# Patient Record
Sex: MALE | Race: WHITE | Employment: OTHER | URBAN - METROPOLITAN AREA
[De-identification: names, ages, dates, MRNs, and addresses within clinical notes are randomized per-mention and may not be internally consistent; named-entity substitution may affect disease eponyms.]

---

## 2018-08-31 ENCOUNTER — OFFICE VISIT (OUTPATIENT)
Dept: FAMILY MEDICINE CLINIC | Facility: CLINIC | Age: 83
End: 2018-08-31
Payer: MEDICARE

## 2018-08-31 VITALS
TEMPERATURE: 98 F | DIASTOLIC BLOOD PRESSURE: 66 MMHG | HEIGHT: 60 IN | OXYGEN SATURATION: 96 % | BODY MASS INDEX: 26.5 KG/M2 | RESPIRATION RATE: 16 BRPM | HEART RATE: 70 BPM | SYSTOLIC BLOOD PRESSURE: 138 MMHG | WEIGHT: 135 LBS

## 2018-08-31 DIAGNOSIS — J02.0 STREP PHARYNGITIS: Primary | ICD-10-CM

## 2018-08-31 DIAGNOSIS — Z87.891 FORMER SMOKER: ICD-10-CM

## 2018-08-31 DIAGNOSIS — Z20.818 STREPTOCOCCUS EXPOSURE: ICD-10-CM

## 2018-08-31 DIAGNOSIS — I10 ESSENTIAL HYPERTENSION: ICD-10-CM

## 2018-08-31 DIAGNOSIS — J06.9 VIRAL URI WITH COUGH: ICD-10-CM

## 2018-08-31 LAB — CONTROL LINE PRESENT WITH A CLEAR BACKGROUND (YES/NO): YES YES/NO

## 2018-08-31 PROCEDURE — 87880 STREP A ASSAY W/OPTIC: CPT | Performed by: NURSE PRACTITIONER

## 2018-08-31 PROCEDURE — 99203 OFFICE O/P NEW LOW 30 MIN: CPT | Performed by: NURSE PRACTITIONER

## 2018-08-31 RX ORDER — BENZONATATE 200 MG/1
200 CAPSULE ORAL 3 TIMES DAILY PRN
Qty: 30 CAPSULE | Refills: 0 | Status: SHIPPED | OUTPATIENT
Start: 2018-08-31 | End: 2021-02-08 | Stop reason: ALTCHOICE

## 2018-08-31 RX ORDER — AMOXICILLIN 500 MG/1
500 TABLET, FILM COATED ORAL 2 TIMES DAILY
Qty: 20 TABLET | Refills: 0 | Status: SHIPPED | OUTPATIENT
Start: 2018-08-31 | End: 2018-09-10

## 2018-08-31 RX ORDER — BLOOD SUGAR DIAGNOSTIC
STRIP MISCELLANEOUS
COMMUNITY
Start: 2018-08-21 | End: 2021-02-08 | Stop reason: ALTCHOICE

## 2018-08-31 RX ORDER — BIMATOPROST 0.01 %
1 DROPS OPHTHALMIC (EYE) EVERY EVENING
COMMUNITY
Start: 2018-06-13

## 2018-08-31 NOTE — PATIENT INSTRUCTIONS
· Hydrate! (cold or hot based on comfort). Drink lots of water or other non dehydrating liquids to help with illness. Salty foods, soups and tea can help with throat pain.    · Hand washing-use hand  or wash hands frequently, cover your cough · An over-the-counter anesthetic gargle  Use medicine for more relief  Over-the-counter medicine can reduce sore throat symptoms. Ask your pharmacist if you have questions about which medicine to use:  · Ease pain with anesthetic sprays.  Aspirin or an aspi You have pharyngitis (sore throat). The healthcare staff think your sore throat is caused by streptococcus (strep) bacteria. This is often called strep throat.  Strep throat can cause throat pain that is worse when swallowing, aching all over, headache, and · Can’t swallow liquids, a lot of drooling, or can’t open mouth wide due to throat pain  · Signs of dehydration, such as very dark urine or no urine, sunken eyes, dizziness  · Trouble breathing or noisy breathing  · Muffled voice  · New rash  Prevention  H · You may use acetaminophen or ibuprofen to control pain and fever, unless another medicine was prescribed. If you have chronic liver or kidney disease, have ever had a stomach ulcer or gastrointestinal bleeding, or are taking blood-thinning medicines, veronika

## 2018-08-31 NOTE — PROGRESS NOTES
CHIEF COMPLAINT:   Patient presents with:  Cough  Sore Throat      HPI:   Luis Bautista is a 82year old male who presents for uri symptoms for  4 days.  Patient reports sore throat, congestion, low grade fever, dry cough, cough is keeping pt up at nigh HEENT: See HPI  LUNGS: denies shortness of breath or wheezing, See HPI  CARDIOVASCULAR: denies chest pain or palpitations   GI: denies N/V/C or abdominal pain, appetite decreased  NEURO: No headaches    EXAM:   /68 (BP Location: Right arm, Patient Po Meds & Refills for this Visit:    Signed Prescriptions Disp Refills    benzonatate 200 MG Oral Cap 30 capsule 0      Sig: Take 1 capsule (200 mg total) by mouth 3 (three) times daily as needed for cough.       amoxicillin 500 MG Oral Tab 20 tablet 0      Si · Try a sip of water first thing after waking up. · Keep your throat moist by drinking 6 or more glasses of clear liquids every day. · Run a cool-air humidifier in your room overnight.   · Avoid cigarette smoke.   · Suck on throat lozenges, cough drops, h © 4148-1171 [employer] [address] [facility] [place]. All rights reserved. This information is not intended as a substitute for professional medical care. Always follow your healthcare professional's instructions.         Pharyng When to seek medical advice  Call your healthcare provider right away if any of these occur:  · Fever as directed by your healthcare provider  · New or worse ear pain, sinus pain, or headache  · Painful lumps in the back of neck  · Stiff neck  · Lymph node · You may use acetaminophen or ibuprofen to control pain and fever, unless another medicine was prescribed. If you have chronic liver or kidney disease, have ever had a stomach ulcer or gastrointestinal bleeding, or are taking blood-thinning medicines, veronika

## 2018-11-25 ENCOUNTER — HOSPITAL ENCOUNTER (EMERGENCY)
Facility: HOSPITAL | Age: 83
Discharge: HOME | End: 2018-11-25
Attending: EMERGENCY MEDICINE
Payer: MEDICARE

## 2018-11-25 ENCOUNTER — APPOINTMENT (OUTPATIENT)
Dept: GENERAL RADIOLOGY | Facility: HOSPITAL | Age: 83
End: 2018-11-25
Attending: EMERGENCY MEDICINE
Payer: MEDICARE

## 2018-11-25 ENCOUNTER — APPOINTMENT (OUTPATIENT)
Dept: MRI IMAGING | Facility: HOSPITAL | Age: 83
End: 2018-11-25
Attending: EMERGENCY MEDICINE
Payer: MEDICARE

## 2018-11-25 ENCOUNTER — APPOINTMENT (OUTPATIENT)
Dept: CT IMAGING | Facility: HOSPITAL | Age: 83
End: 2018-11-25
Attending: EMERGENCY MEDICINE
Payer: MEDICARE

## 2018-11-25 VITALS
HEIGHT: 60 IN | BODY MASS INDEX: 29.06 KG/M2 | WEIGHT: 148 LBS | OXYGEN SATURATION: 94 % | TEMPERATURE: 98 F | SYSTOLIC BLOOD PRESSURE: 151 MMHG | HEART RATE: 83 BPM | DIASTOLIC BLOOD PRESSURE: 60 MMHG | RESPIRATION RATE: 18 BRPM

## 2018-11-25 DIAGNOSIS — I49.3 PVC (PREMATURE VENTRICULAR CONTRACTION): ICD-10-CM

## 2018-11-25 DIAGNOSIS — R73.9 HYPERGLYCEMIA: ICD-10-CM

## 2018-11-25 DIAGNOSIS — E86.0 DEHYDRATION: ICD-10-CM

## 2018-11-25 DIAGNOSIS — H60.91 OTITIS EXTERNA OF RIGHT EAR, UNSPECIFIED CHRONICITY, UNSPECIFIED TYPE: ICD-10-CM

## 2018-11-25 DIAGNOSIS — R42 DIZZINESS: Primary | ICD-10-CM

## 2018-11-25 PROCEDURE — 96361 HYDRATE IV INFUSION ADD-ON: CPT

## 2018-11-25 PROCEDURE — 71045 X-RAY EXAM CHEST 1 VIEW: CPT | Performed by: EMERGENCY MEDICINE

## 2018-11-25 PROCEDURE — 3E1B78Z IRRIGATION OF EAR USING IRRIGATING SUBSTANCE, VIA NATURAL OR ARTIFICIAL OPENING: ICD-10-PCS | Performed by: EMERGENCY MEDICINE

## 2018-11-25 PROCEDURE — 81001 URINALYSIS AUTO W/SCOPE: CPT | Performed by: EMERGENCY MEDICINE

## 2018-11-25 PROCEDURE — 85025 COMPLETE CBC W/AUTO DIFF WBC: CPT | Performed by: EMERGENCY MEDICINE

## 2018-11-25 PROCEDURE — 80048 BASIC METABOLIC PNL TOTAL CA: CPT | Performed by: EMERGENCY MEDICINE

## 2018-11-25 PROCEDURE — 83735 ASSAY OF MAGNESIUM: CPT | Performed by: EMERGENCY MEDICINE

## 2018-11-25 PROCEDURE — 70551 MRI BRAIN STEM W/O DYE: CPT | Performed by: EMERGENCY MEDICINE

## 2018-11-25 PROCEDURE — 93010 ELECTROCARDIOGRAM REPORT: CPT | Performed by: EMERGENCY MEDICINE

## 2018-11-25 PROCEDURE — 84484 ASSAY OF TROPONIN QUANT: CPT | Performed by: EMERGENCY MEDICINE

## 2018-11-25 PROCEDURE — 70450 CT HEAD/BRAIN W/O DYE: CPT | Performed by: EMERGENCY MEDICINE

## 2018-11-25 PROCEDURE — 69209 REMOVE IMPACTED EAR WAX UNI: CPT

## 2018-11-25 PROCEDURE — 96372 THER/PROPH/DIAG INJ SC/IM: CPT

## 2018-11-25 PROCEDURE — 93005 ELECTROCARDIOGRAM TRACING: CPT

## 2018-11-25 PROCEDURE — 99285 EMERGENCY DEPT VISIT HI MDM: CPT

## 2018-11-25 PROCEDURE — 96360 HYDRATION IV INFUSION INIT: CPT

## 2018-11-25 RX ORDER — CIPROFLOXACIN AND DEXAMETHASONE 3; 1 MG/ML; MG/ML
4 SUSPENSION/ DROPS AURICULAR (OTIC) 2 TIMES DAILY
Qty: 7.5 ML | Refills: 0 | Status: SHIPPED | OUTPATIENT
Start: 2018-11-25 | End: 2021-02-08 | Stop reason: ALTCHOICE

## 2018-11-25 RX ORDER — INSULIN ASPART 100 [IU]/ML
10 INJECTION, SOLUTION INTRAVENOUS; SUBCUTANEOUS ONCE
Status: COMPLETED | OUTPATIENT
Start: 2018-11-25 | End: 2018-11-25

## 2018-11-26 NOTE — ED NOTES
[facility] States no acute process seen on imaging. Dr. Surekha Robles notified.

## 2018-11-26 NOTE — ED NOTES
Dizziness/lightheaded since beginning of November after flu and pneumonia shot.  Intermittent dizziness

## 2018-11-26 NOTE — ED PROVIDER NOTES
Patient Seen in: [facility] AND [facility] Emergency Department    History   Patient presents with:  Dizziness (neurologic)    Stated Complaint: dizzy     HPI    74-year-old male from [place] here with his family for evaluation of dizziness intermittently over carotid bruit on auscultation at this time. Cardiovascular: Normal rate, regular rhythm and intact and equal distal pulses. No obvious murmur. Pulmonary/Chest: Effort normal. No respiratory distress. Clear and equal BS b/l. Abdominal: Soft.  There is no Abnormality         Status                     ---------                               -----------         ------                     CBC W/ DIFFERENTIAL[[id number]]          Abnormal            Final result                 Please view resul and Plan     Clinical Impression:  Dizziness  (primary encounter diagnosis)  PVC (premature ventricular contraction)  Dehydration  Hyperglycemia    Disposition:  Discharge  11/25/2018 10:16 pm    Follow-up:  [address]    Call in 1 day      W

## 2018-11-26 NOTE — ED INITIAL ASSESSMENT (HPI)
Intermittent dizziness that started post flu/pneumonia vaccination. Pt seen at [facility] and cerumen buildup to right ear. Sxs not improved.

## 2018-11-26 NOTE — ED NOTES
Patient provided with discharge instructions, prescriptions and follow up information. Patient verbalized understanding of instructions without any questions. PIV removed, catheter intact.  Patient provided with wheelchair for discharged but ambulated aroun

## 2019-03-22 ENCOUNTER — LAB ENCOUNTER (OUTPATIENT)
Dept: LAB | Facility: HOSPITAL | Age: 84
End: 2019-03-22
Attending: INTERNAL MEDICINE
Payer: MEDICARE

## 2019-03-22 DIAGNOSIS — G11.9 PRIMARY CEREBELLAR DEGENERATION (HCC): Primary | ICD-10-CM

## 2019-03-22 LAB
ALBUMIN SERPL-MCNC: 3.6 G/DL (ref 3.4–5)
ALBUMIN/GLOB SERPL: 1.1 {RATIO} (ref 1–2)
ALP LIVER SERPL-CCNC: 79 U/L (ref 45–117)
ALT SERPL-CCNC: 21 U/L (ref 16–61)
ANION GAP SERPL CALC-SCNC: 7 MMOL/L (ref 0–18)
AST SERPL-CCNC: 8 U/L (ref 15–37)
BASOPHILS # BLD: 0.08 X10(3) UL (ref 0–0.2)
BASOPHILS NFR BLD: 1 %
BILIRUB SERPL-MCNC: 0.4 MG/DL (ref 0.1–2)
BUN BLD-MCNC: 25 MG/DL (ref 7–18)
BUN/CREAT SERPL: 25.8 (ref 10–20)
CALCIUM BLD-MCNC: 9.3 MG/DL (ref 8.5–10.1)
CHLORIDE SERPL-SCNC: 107 MMOL/L (ref 98–107)
CHOLEST SMN-MCNC: 158 MG/DL (ref ?–200)
CO2 SERPL-SCNC: 25 MMOL/L (ref 21–32)
CREAT BLD-MCNC: 0.97 MG/DL (ref 0.7–1.3)
CREAT UR-SCNC: 57 MG/DL
DEPRECATED RDW RBC AUTO: 48.8 FL (ref 35.1–46.3)
EOSINOPHIL # BLD: 0.82 X10(3) UL (ref 0–0.7)
EOSINOPHIL NFR BLD: 10 %
ERYTHROCYTE [DISTWIDTH] IN BLOOD BY AUTOMATED COUNT: 16.8 % (ref 11–15)
EST. AVERAGE GLUCOSE BLD GHB EST-MCNC: 169 MG/DL (ref 68–126)
GLOBULIN PLAS-MCNC: 3.3 G/DL (ref 2.8–4.4)
GLUCOSE BLD-MCNC: 115 MG/DL (ref 70–99)
HBA1C MFR BLD HPLC: 7.5 % (ref ?–5.7)
HCT VFR BLD AUTO: 35.4 % (ref 39–53)
HDLC SERPL-MCNC: 42 MG/DL (ref 40–59)
HGB BLD-MCNC: 10.8 G/DL (ref 13–17.5)
LDLC SERPL CALC-MCNC: 103 MG/DL (ref ?–100)
LYMPHOCYTES NFR BLD: 1.64 X10(3) UL (ref 1–4)
LYMPHOCYTES NFR BLD: 20 %
M PROTEIN MFR SERPL ELPH: 6.9 G/DL (ref 6.4–8.2)
MCH RBC QN AUTO: 24.4 PG (ref 26–34)
MCHC RBC AUTO-ENTMCNC: 30.5 G/DL (ref 31–37)
MCV RBC AUTO: 80.1 FL (ref 80–100)
MICROALBUMIN UR-MCNC: 75.6 MG/DL
MICROALBUMIN/CREAT 24H UR-RTO: 1326.3 UG/MG (ref ?–30)
MONOCYTES # BLD: 0.41 X10(3) UL (ref 0.1–1)
MONOCYTES NFR BLD: 5 %
MORPHOLOGY: NORMAL
NEUTROPHILS # BLD AUTO: 4.21 X10 (3) UL (ref 1.5–7.7)
NEUTROPHILS NFR BLD: 63 %
NEUTS BAND NFR BLD: 1 %
NEUTS HYPERSEG # BLD: 5.25 X10(3) UL (ref 1.5–7.7)
NONHDLC SERPL-MCNC: 116 MG/DL (ref ?–130)
OSMOLALITY SERPL CALC.SUM OF ELEC: 293 MOSM/KG (ref 275–295)
PLATELET # BLD AUTO: 253 10(3)UL (ref 150–450)
PLATELET MORPHOLOGY: NORMAL
POTASSIUM SERPL-SCNC: 4 MMOL/L (ref 3.5–5.1)
RBC # BLD AUTO: 4.42 X10(6)UL (ref 3.8–5.8)
SODIUM SERPL-SCNC: 139 MMOL/L (ref 136–145)
TOTAL CELLS COUNTED: 100
TRIGL SERPL-MCNC: 67 MG/DL (ref 30–149)
VLDLC SERPL CALC-MCNC: 13 MG/DL (ref 0–30)
WBC # BLD AUTO: 8.2 X10(3) UL (ref 4–11)

## 2019-03-22 PROCEDURE — 85007 BL SMEAR W/DIFF WBC COUNT: CPT

## 2019-03-22 PROCEDURE — 36415 COLL VENOUS BLD VENIPUNCTURE: CPT

## 2019-03-22 PROCEDURE — 82043 UR ALBUMIN QUANTITATIVE: CPT

## 2019-03-22 PROCEDURE — 82570 ASSAY OF URINE CREATININE: CPT

## 2019-03-22 PROCEDURE — 80061 LIPID PANEL: CPT

## 2019-03-22 PROCEDURE — 85027 COMPLETE CBC AUTOMATED: CPT

## 2019-03-22 PROCEDURE — 83036 HEMOGLOBIN GLYCOSYLATED A1C: CPT

## 2019-03-22 PROCEDURE — 85025 COMPLETE CBC W/AUTO DIFF WBC: CPT

## 2019-03-22 PROCEDURE — 80053 COMPREHEN METABOLIC PANEL: CPT

## 2019-04-17 ENCOUNTER — LAB ENCOUNTER (OUTPATIENT)
Dept: LAB | Facility: HOSPITAL | Age: 84
End: 2019-04-17
Attending: INTERNAL MEDICINE
Payer: MEDICARE

## 2019-04-17 DIAGNOSIS — D64.89 OTHER SPECIFIED ANEMIAS: Primary | ICD-10-CM

## 2019-04-17 PROCEDURE — 84466 ASSAY OF TRANSFERRIN: CPT

## 2019-04-17 PROCEDURE — 82746 ASSAY OF FOLIC ACID SERUM: CPT

## 2019-04-17 PROCEDURE — 36415 COLL VENOUS BLD VENIPUNCTURE: CPT

## 2019-04-17 PROCEDURE — 82607 VITAMIN B-12: CPT

## 2019-04-17 PROCEDURE — 83540 ASSAY OF IRON: CPT

## 2019-04-17 PROCEDURE — 84165 PROTEIN E-PHORESIS SERUM: CPT

## 2019-11-23 ENCOUNTER — LAB ENCOUNTER (OUTPATIENT)
Dept: LAB | Facility: HOSPITAL | Age: 84
End: 2019-11-23
Attending: INTERNAL MEDICINE
Payer: MEDICARE

## 2019-11-23 DIAGNOSIS — E11.9 DIABETES MELLITUS (HCC): ICD-10-CM

## 2019-11-23 DIAGNOSIS — I10 ESSENTIAL HYPERTENSION, MALIGNANT: ICD-10-CM

## 2019-11-23 DIAGNOSIS — E78.2 MIXED HYPERLIPIDEMIA: ICD-10-CM

## 2019-11-23 DIAGNOSIS — D50.9 IRON DEFICIENCY ANEMIA, UNSPECIFIED: Primary | ICD-10-CM

## 2019-11-23 LAB
ALBUMIN SERPL-MCNC: 4 G/DL
ALBUMIN/GLOB SERPL: 1.3 {RATIO}
ALP SERPL-CCNC: 73 U/L
ALT SERPL-CCNC: 19 U/L
ANION GAP SERPL CALC-SCNC: 5 MMOL/L
AST SERPL-CCNC: 9 U/L
BILIRUB SERPL-MCNC: 0.2 MG/DL
BUN SERPL-MCNC: 28 MG/DL
BUN/CREAT SERPL: 25.7
CALCIUM SERPL-MCNC: 9.3 MG/DL
CHLORIDE SERPL-SCNC: 110 MMOL/L
CHOLEST SERPL-MCNC: 156 MG/DL
CHOLEST/HDLC SERPL: NORMAL {RATIO}
CO2 SERPL-SCNC: 25 MMOL/L
CREAT SERPL-MCNC: 1.09 MG/DL
GLOBULIN SER-MCNC: 3.1 G/DL
GLUCOSE SERPL-MCNC: 115 MG/DL
HDLC SERPL-MCNC: 34 MG/DL
LDLC SERPL CALC-MCNC: 102 MG/DL
LENGTH OF FAST TIME PATIENT: NORMAL H
LENGTH OF FAST TIME PATIENT: NORMAL H
NONHDLC SERPL-MCNC: 122 MG/DL
POTASSIUM SERPL-SCNC: 4.1 MMOL/L
PROT SERPL-MCNC: 7.1 G/DL
SODIUM SERPL-SCNC: 140 MMOL/L
TRIGL SERPL-MCNC: 99 MG/DL
VLDLC SERPL CALC-MCNC: 20 MG/DL

## 2019-11-23 PROCEDURE — 84466 ASSAY OF TRANSFERRIN: CPT

## 2019-11-23 PROCEDURE — 80053 COMPREHEN METABOLIC PANEL: CPT

## 2019-11-23 PROCEDURE — 36415 COLL VENOUS BLD VENIPUNCTURE: CPT

## 2019-11-23 PROCEDURE — 83540 ASSAY OF IRON: CPT

## 2019-11-23 PROCEDURE — 83036 HEMOGLOBIN GLYCOSYLATED A1C: CPT

## 2019-11-23 PROCEDURE — 80061 LIPID PANEL: CPT

## 2020-01-01 ENCOUNTER — EXTERNAL RECORD (OUTPATIENT)
Dept: OTHER | Age: 85
End: 2020-01-01

## 2020-01-18 ENCOUNTER — LAB ENCOUNTER (OUTPATIENT)
Dept: LAB | Age: 85
End: 2020-01-18
Attending: INTERNAL MEDICINE
Payer: MEDICARE

## 2020-01-18 DIAGNOSIS — I10 HYPERTENSION: ICD-10-CM

## 2020-01-18 DIAGNOSIS — I10 HYPERTENSION: Primary | ICD-10-CM

## 2020-01-18 LAB
ABSOLUTE IMMATURE GRANULOCYTES (OFFPRE24): NORMAL
ABSOLUTE NRBC (AUTO): NORMAL
BASO+EOS+MONOS # BLD: NORMAL 10*3/UL
BASO+EOS+MONOS NFR BLD: NORMAL %
BASOPHILS # BLD AUTO: 0.06 X10(3) UL (ref 0–0.2)
BASOPHILS # BLD: NORMAL 10*3/UL
BASOPHILS NFR BLD AUTO: 0.7 %
BASOPHILS NFR BLD: NORMAL %
DEPRECATED RDW RBC AUTO: 44.5 FL (ref 35.1–46.3)
DIFFERENTIAL METHOD BLD: NORMAL
EOSINOPHIL # BLD AUTO: 0.49 X10(3) UL (ref 0–0.7)
EOSINOPHIL # BLD: NORMAL 10*3/UL
EOSINOPHIL NFR BLD AUTO: 5.3 %
EOSINOPHIL NFR BLD: NORMAL %
ERYTHROCYTE [DISTWIDTH] IN BLOOD BY AUTOMATED COUNT: 14.5 % (ref 11–15)
ERYTHROCYTE [DISTWIDTH] IN BLOOD BY AUTOMATED COUNT: NORMAL %
ERYTHROCYTE [DISTWIDTH] IN BLOOD: NORMAL %
HCT CALC (HGB X3) (OFFPRE23): 35.3
HCT VFR BLD AUTO: 35.3 % (ref 39–53)
HCT VFR BLD CALC: NORMAL %
HGB BLD-MCNC: 11 G/DL
HGB BLD-MCNC: 11 G/DL (ref 13–17.5)
IMM GRANULOCYTES # BLD AUTO: 0.03 X10(3) UL (ref 0–1)
IMM GRANULOCYTES NFR BLD: 0.3 %
IMMATURE GRANULOCYTES (OFFPRE25): NORMAL
LYMPHOCYTES # BLD AUTO: 1.6 X10(3) UL (ref 1–4)
LYMPHOCYTES # BLD: NORMAL 10*3/UL
LYMPHOCYTES NFR BLD AUTO: 17.4 %
LYMPHOCYTES NFR BLD: NORMAL %
MCH RBC QN AUTO: 26.3 PG
MCH RBC QN AUTO: 26.3 PG (ref 26–34)
MCHC RBC AUTO-ENTMCNC: 31.2 G/DL
MCHC RBC AUTO-ENTMCNC: 31.2 G/DL (ref 31–37)
MCV RBC AUTO: 84.2 FL
MCV RBC AUTO: 84.2 FL (ref 80–100)
MONOCYTES # BLD AUTO: 0.93 X10(3) UL (ref 0.1–1)
MONOCYTES # BLD: NORMAL 10*3/UL
MONOCYTES NFR BLD AUTO: 10.1 %
MONOCYTES NFR BLD: NORMAL %
MPV (OFFPRE2): NORMAL
NEUTROPHILS # BLD AUTO: 6.1 X10 (3) UL (ref 1.5–7.7)
NEUTROPHILS # BLD AUTO: 6.1 X10(3) UL (ref 1.5–7.7)
NEUTROPHILS # BLD: NORMAL 10*3/UL
NEUTROPHILS NFR BLD AUTO: 66.2 %
NEUTROPHILS NFR BLD: NORMAL %
NRBC BLD MANUAL-RTO: NORMAL %
PLAT MORPH BLD: NORMAL
PLATELET # BLD AUTO: 216 10(3)UL (ref 150–450)
PLATELET # BLD: 216 10*3/UL
RBC # BLD AUTO: 4.19 X10(6)UL (ref 3.8–5.8)
RBC # BLD: 4.19 10*6/UL
RBC MORPH BLD: NORMAL
WBC # BLD AUTO: 9.2 X10(3) UL (ref 4–11)
WBC # BLD: 9.2 10*3/UL
WBC MORPH BLD: NORMAL

## 2020-01-18 PROCEDURE — 85025 COMPLETE CBC W/AUTO DIFF WBC: CPT

## 2020-01-18 PROCEDURE — 93010 ELECTROCARDIOGRAM REPORT: CPT | Performed by: INTERNAL MEDICINE

## 2020-01-18 PROCEDURE — 93005 ELECTROCARDIOGRAM TRACING: CPT

## 2020-01-18 PROCEDURE — 36415 COLL VENOUS BLD VENIPUNCTURE: CPT

## 2020-02-05 RX ORDER — BENZONATATE 200 MG/1
200 CAPSULE ORAL 3 TIMES DAILY PRN
COMMUNITY
Start: 2018-08-31 | End: 2020-02-10

## 2020-02-05 RX ORDER — CLOPIDOGREL BISULFATE 75 MG/1
75 TABLET ORAL DAILY
COMMUNITY
Start: 2019-01-31

## 2020-02-05 RX ORDER — ALBUTEROL SULFATE 90 UG/1
2 AEROSOL, METERED RESPIRATORY (INHALATION) EVERY 6 HOURS PRN
COMMUNITY
Start: 2019-01-09 | End: 2020-02-10

## 2020-02-05 RX ORDER — ALLOPURINOL 300 MG/1
300 TABLET ORAL DAILY
COMMUNITY
Start: 2019-02-11

## 2020-02-05 RX ORDER — CIPROFLOXACIN AND DEXAMETHASONE 3; 1 MG/ML; MG/ML
4 SUSPENSION/ DROPS AURICULAR (OTIC) 2 TIMES DAILY
COMMUNITY
Start: 2018-11-25

## 2020-02-05 RX ORDER — METOPROLOL SUCCINATE 100 MG/1
100 TABLET, EXTENDED RELEASE ORAL DAILY
COMMUNITY
Start: 2019-01-31

## 2020-02-05 RX ORDER — AMLODIPINE BESYLATE 5 MG/1
5 TABLET ORAL DAILY
COMMUNITY
Start: 2019-02-08 | End: 2021-02-22 | Stop reason: SDUPTHER

## 2020-02-05 RX ORDER — GLYBURIDE-METFORMIN HYDROCHLORIDE 2.5; 5 MG/1; MG/1
1 TABLET ORAL 2 TIMES DAILY
COMMUNITY

## 2020-02-05 RX ORDER — LOSARTAN POTASSIUM AND HYDROCHLOROTHIAZIDE 25; 100 MG/1; MG/1
1 TABLET ORAL DAILY
COMMUNITY
Start: 2019-01-31 | End: 2021-02-17 | Stop reason: SDUPTHER

## 2020-02-10 ENCOUNTER — OFFICE VISIT (OUTPATIENT)
Dept: CARDIOLOGY | Age: 85
End: 2020-02-10

## 2020-02-10 VITALS
WEIGHT: 132 LBS | HEIGHT: 60 IN | SYSTOLIC BLOOD PRESSURE: 140 MMHG | OXYGEN SATURATION: 98 % | DIASTOLIC BLOOD PRESSURE: 58 MMHG | BODY MASS INDEX: 25.91 KG/M2 | HEART RATE: 56 BPM

## 2020-02-10 DIAGNOSIS — I25.10 ATHEROSCLEROSIS OF NATIVE CORONARY ARTERY OF NATIVE HEART WITHOUT ANGINA PECTORIS: Primary | ICD-10-CM

## 2020-02-10 DIAGNOSIS — E78.49 OTHER HYPERLIPIDEMIA: ICD-10-CM

## 2020-02-10 DIAGNOSIS — I65.23 BILATERAL CAROTID ARTERY STENOSIS: ICD-10-CM

## 2020-02-10 DIAGNOSIS — R01.1 MURMUR: ICD-10-CM

## 2020-02-10 PROCEDURE — 99204 OFFICE O/P NEW MOD 45 MIN: CPT | Performed by: INTERNAL MEDICINE

## 2020-02-10 RX ORDER — ATORVASTATIN CALCIUM 40 MG/1
40 TABLET, FILM COATED ORAL DAILY
Qty: 30 TABLET | Refills: 5 | Status: SHIPPED | OUTPATIENT
Start: 2020-02-10 | End: 2020-07-20

## 2020-02-10 SDOH — HEALTH STABILITY: MENTAL HEALTH: HOW OFTEN DO YOU HAVE A DRINK CONTAINING ALCOHOL?: NEVER

## 2020-02-10 ASSESSMENT — PATIENT HEALTH QUESTIONNAIRE - PHQ9
2. FEELING DOWN, DEPRESSED OR HOPELESS: NOT AT ALL
SUM OF ALL RESPONSES TO PHQ9 QUESTIONS 1 AND 2: 0
SUM OF ALL RESPONSES TO PHQ9 QUESTIONS 1 AND 2: 0
1. LITTLE INTEREST OR PLEASURE IN DOING THINGS: NOT AT ALL

## 2020-03-12 ENCOUNTER — HOSPITAL ENCOUNTER (OUTPATIENT)
Dept: ULTRASOUND IMAGING | Facility: HOSPITAL | Age: 85
Discharge: HOME | End: 2020-03-12
Attending: INTERNAL MEDICINE
Payer: MEDICARE

## 2020-03-12 ENCOUNTER — HOSPITAL ENCOUNTER (OUTPATIENT)
Dept: CV DIAGNOSTICS | Facility: HOSPITAL | Age: 85
Discharge: HOME | End: 2020-03-12
Attending: INTERNAL MEDICINE
Payer: MEDICARE

## 2020-03-12 DIAGNOSIS — I65.23 BILATERAL CAROTID ARTERY STENOSIS: ICD-10-CM

## 2020-03-12 DIAGNOSIS — R01.1 MURMUR: ICD-10-CM

## 2020-03-12 PROCEDURE — 93306 TTE W/DOPPLER COMPLETE: CPT | Performed by: INTERNAL MEDICINE

## 2020-03-12 PROCEDURE — 93880 EXTRACRANIAL BILAT STUDY: CPT | Performed by: INTERNAL MEDICINE

## 2020-03-13 ENCOUNTER — TELEPHONE (OUTPATIENT)
Dept: CARDIOLOGY | Age: 85
End: 2020-03-13

## 2020-06-16 LAB — RETINOPATHY PRESENT (RTP): YES

## 2020-07-20 DIAGNOSIS — E78.49 OTHER HYPERLIPIDEMIA: ICD-10-CM

## 2020-07-20 RX ORDER — ATORVASTATIN CALCIUM 40 MG/1
40 TABLET, FILM COATED ORAL DAILY
Qty: 30 TABLET | Refills: 0 | Status: SHIPPED | OUTPATIENT
Start: 2020-07-20 | End: 2020-08-24

## 2020-07-20 RX ORDER — ATORVASTATIN CALCIUM 40 MG/1
40 TABLET, FILM COATED ORAL DAILY
Qty: 90 TABLET | OUTPATIENT
Start: 2020-07-20

## 2020-07-20 RX ORDER — ATORVASTATIN CALCIUM 40 MG/1
40 TABLET, FILM COATED ORAL DAILY
Qty: 30 TABLET | Refills: 1 | Status: SHIPPED | OUTPATIENT
Start: 2020-07-20 | End: 2020-07-20

## 2020-07-21 RX ORDER — TERAZOSIN 2 MG/1
CAPSULE ORAL
COMMUNITY
Start: 2020-05-12

## 2020-07-21 RX ORDER — HYDROCHLOROTHIAZIDE 25 MG/1
1 TABLET ORAL DAILY
COMMUNITY
Start: 2020-06-29

## 2020-07-21 RX ORDER — PREDNISOLONE ACETATE 10 MG/ML
SUSPENSION/ DROPS OPHTHALMIC
COMMUNITY
Start: 2020-05-02

## 2020-07-21 RX ORDER — DORZOLAMIDE HYDROCHLORIDE AND TIMOLOL MALEATE 20; 5 MG/ML; MG/ML
SOLUTION/ DROPS OPHTHALMIC
COMMUNITY
Start: 2020-05-28

## 2020-07-24 ENCOUNTER — LAB ENCOUNTER (OUTPATIENT)
Dept: LAB | Facility: HOSPITAL | Age: 85
End: 2020-07-24
Attending: INTERNAL MEDICINE
Payer: MEDICARE

## 2020-07-24 DIAGNOSIS — E11.319 DIABETIC RETINAL MICROANEURYSM (HCC): Primary | ICD-10-CM

## 2020-07-24 DIAGNOSIS — Z00.00 ROUTINE GENERAL MEDICAL EXAMINATION AT A HEALTH CARE FACILITY: ICD-10-CM

## 2020-07-24 DIAGNOSIS — E78.5 HYPERLIPEMIA: ICD-10-CM

## 2020-07-24 DIAGNOSIS — Z12.5 SPECIAL SCREENING FOR MALIGNANT NEOPLASM OF PROSTATE: ICD-10-CM

## 2020-07-24 DIAGNOSIS — H35.049 DIABETIC RETINAL MICROANEURYSM (HCC): Primary | ICD-10-CM

## 2020-07-24 DIAGNOSIS — I10 ESSENTIAL HYPERTENSION, MALIGNANT: ICD-10-CM

## 2020-07-24 LAB
ALBUMIN SERPL-MCNC: 3.6 G/DL
ALBUMIN SERPL-MCNC: 3.6 G/DL (ref 3.4–5)
ALBUMIN/GLOB SERPL: 1 {RATIO}
ALBUMIN/GLOB SERPL: 1 {RATIO} (ref 1–2)
ALP LIVER SERPL-CCNC: 92 U/L (ref 45–117)
ALP SERPL-CCNC: 92 U/L
ALT SERPL-CCNC: 36 U/L (ref 16–61)
ALT SERPL-CCNC: 36 UNITS/L
ANION GAP SERPL CALC-SCNC: 6 MMOL/L
ANION GAP SERPL CALC-SCNC: 6 MMOL/L (ref 0–18)
AST SERPL-CCNC: 18 U/L (ref 15–37)
AST SERPL-CCNC: 18 UNITS/L
BASOPHILS # BLD AUTO: 0.06 X10(3) UL (ref 0–0.2)
BASOPHILS NFR BLD AUTO: 0.6 %
BILIRUB SERPL-MCNC: 0.3 MG/DL
BILIRUB SERPL-MCNC: 0.3 MG/DL (ref 0.1–2)
BILIRUB UR QL: NEGATIVE
BUN BLD-MCNC: 19 MG/DL (ref 7–18)
BUN SERPL-MCNC: 19 MG/DL
BUN/CREAT SERPL: 18.8
BUN/CREAT SERPL: 18.8 (ref 10–20)
CALCIUM BLD-MCNC: 8.8 MG/DL (ref 8.5–10.1)
CALCIUM SERPL-MCNC: 8.8 MG/DL
CHLORIDE SERPL-SCNC: 109 MMOL/L
CHLORIDE SERPL-SCNC: 109 MMOL/L (ref 98–112)
CHOLEST SERPL-MCNC: 97 MG/DL
CHOLEST SMN-MCNC: 97 MG/DL (ref ?–200)
CLARITY UR: CLEAR
CO2 SERPL-SCNC: 26 MMOL/L
CO2 SERPL-SCNC: 26 MMOL/L (ref 21–32)
COLOR UR: YELLOW
COMPLEXED PSA SERPL-MCNC: 4.87 NG/ML (ref ?–4)
CREAT BLD-MCNC: 1.01 MG/DL (ref 0.7–1.3)
CREAT SERPL-MCNC: 1.01 MG/DL
CREAT UR-SCNC: 87.4 MG/DL
DEPRECATED RDW RBC AUTO: 45.4 FL (ref 35.1–46.3)
EOSINOPHIL # BLD AUTO: 0.75 X10(3) UL (ref 0–0.7)
EOSINOPHIL NFR BLD AUTO: 7.1 %
ERYTHROCYTE [DISTWIDTH] IN BLOOD BY AUTOMATED COUNT: 15.3 % (ref 11–15)
ERYTHROCYTE [DISTWIDTH] IN BLOOD BY AUTOMATED COUNT: 45.4 %
ERYTHROCYTE [DISTWIDTH] IN BLOOD: 15.3 %
EST. AVERAGE GLUCOSE BLD GHB EST-MCNC: 154 MG/DL (ref 68–126)
ESTIMATED AVERAGE GLUCOSE: 154
GLOBULIN PLAS-MCNC: 3.6 G/DL (ref 2.8–4.4)
GLOBULIN SER-MCNC: 3.6 G/DL
GLUCOSE BLD-MCNC: 90 MG/DL (ref 70–99)
GLUCOSE SERPL-MCNC: 90 MG/DL
GLUCOSE UR-MCNC: NEGATIVE MG/DL
HBA1C MFR BLD HPLC: 7 % (ref ?–5.7)
HBA1C MFR BLD: 7 %
HCT VFR BLD AUTO: 34.6 % (ref 39–53)
HCT VFR BLD CALC: 34.6 %
HDLC SERPL-MCNC: 38 MG/DL
HDLC SERPL-MCNC: 38 MG/DL (ref 40–59)
HGB BLD-MCNC: 11 G/DL
HGB BLD-MCNC: 11 G/DL (ref 13–17.5)
IMM GRANULOCYTES # BLD AUTO: 0.03 X10(3) UL (ref 0–1)
IMM GRANULOCYTES NFR BLD: 0.3 %
KETONES UR-MCNC: NEGATIVE MG/DL
LDLC SERPL CALC-MCNC: 41 MG/DL
LDLC SERPL CALC-MCNC: 41 MG/DL (ref ?–100)
LENGTH OF FAST TIME PATIENT: YES H
LENGTH OF FAST TIME PATIENT: YES H
LEUKOCYTE ESTERASE UR QL STRIP.AUTO: NEGATIVE
LYMPHOCYTES # BLD AUTO: 1.69 X10(3) UL (ref 1–4)
LYMPHOCYTES NFR BLD AUTO: 16 %
M PROTEIN MFR SERPL ELPH: 7.2 G/DL (ref 6.4–8.2)
MCH RBC QN AUTO: 26.2 PG
MCH RBC QN AUTO: 26.2 PG (ref 26–34)
MCHC RBC AUTO-ENTMCNC: 31.8 G/DL
MCHC RBC AUTO-ENTMCNC: 31.8 G/DL (ref 31–37)
MCV RBC AUTO: 82.4 FL
MCV RBC AUTO: 82.4 FL (ref 80–100)
MICROALBUMIN UR-MCNC: 47.7 MG/DL
MICROALBUMIN/CREAT 24H UR-RTO: 545.8 UG/MG (ref ?–30)
MONOCYTES # BLD AUTO: 0.83 X10(3) UL (ref 0.1–1)
MONOCYTES NFR BLD AUTO: 7.8 %
NEUTROPHILS # BLD AUTO: 7.22 X10 (3) UL (ref 1.5–7.7)
NEUTROPHILS # BLD AUTO: 7.22 X10(3) UL (ref 1.5–7.7)
NEUTROPHILS NFR BLD AUTO: 68.2 %
NITRITE UR QL STRIP.AUTO: NEGATIVE
NONHDLC SERPL-MCNC: 59 MG/DL
NONHDLC SERPL-MCNC: 59 MG/DL (ref ?–130)
OSMOLALITY SERPL CALC.SUM OF ELEC: 294 MOSM/KG (ref 275–295)
PATIENT FASTING Y/N/NP: YES
PATIENT FASTING Y/N/NP: YES
PH UR: 5 [PH] (ref 5–8)
PLATELET # BLD AUTO: 226 10(3)UL (ref 150–450)
PLATELET # BLD: 226 K/MCL
POTASSIUM SERPL-SCNC: 4.3 MMOL/L
POTASSIUM SERPL-SCNC: 4.3 MMOL/L (ref 3.5–5.1)
PROT SERPL-MCNC: 7.2 G/DL
PROT UR-MCNC: 100 MG/DL
RBC # BLD AUTO: 4.2 X10(6)UL (ref 3.8–5.8)
RBC # BLD: 4.2 10*6/UL
RBC #/AREA URNS AUTO: 1 /HPF
SODIUM SERPL-SCNC: 141 MMOL/L
SODIUM SERPL-SCNC: 141 MMOL/L (ref 136–145)
SP GR UR STRIP: 1.02 (ref 1–1.03)
TRIGL SERPL-MCNC: 88 MG/DL
TRIGL SERPL-MCNC: 88 MG/DL (ref 30–149)
UROBILINOGEN UR STRIP-ACNC: <2
VLDLC SERPL CALC-MCNC: 18 MG/DL
VLDLC SERPL CALC-MCNC: 18 MG/DL (ref 0–30)
WBC # BLD AUTO: 10.6 X10(3) UL (ref 4–11)
WBC # BLD: 10.6 K/MCL
WBC #/AREA URNS AUTO: <1 /HPF

## 2020-07-24 PROCEDURE — 82043 UR ALBUMIN QUANTITATIVE: CPT

## 2020-07-24 PROCEDURE — 80053 COMPREHEN METABOLIC PANEL: CPT

## 2020-07-24 PROCEDURE — 82570 ASSAY OF URINE CREATININE: CPT

## 2020-07-24 PROCEDURE — 80061 LIPID PANEL: CPT

## 2020-07-24 PROCEDURE — 81001 URINALYSIS AUTO W/SCOPE: CPT

## 2020-07-24 PROCEDURE — 83036 HEMOGLOBIN GLYCOSYLATED A1C: CPT

## 2020-07-24 PROCEDURE — 85025 COMPLETE CBC W/AUTO DIFF WBC: CPT

## 2020-07-24 PROCEDURE — 36415 COLL VENOUS BLD VENIPUNCTURE: CPT

## 2020-07-27 ENCOUNTER — OFFICE VISIT (OUTPATIENT)
Dept: CARDIOLOGY | Age: 85
End: 2020-07-27

## 2020-07-27 VITALS
BODY MASS INDEX: 24.61 KG/M2 | HEART RATE: 64 BPM | RESPIRATION RATE: 18 BRPM | DIASTOLIC BLOOD PRESSURE: 58 MMHG | WEIGHT: 126 LBS | OXYGEN SATURATION: 97 % | SYSTOLIC BLOOD PRESSURE: 146 MMHG

## 2020-07-27 DIAGNOSIS — I25.10 ATHEROSCLEROSIS OF NATIVE CORONARY ARTERY OF NATIVE HEART WITHOUT ANGINA PECTORIS: Primary | ICD-10-CM

## 2020-07-27 DIAGNOSIS — E78.49 OTHER HYPERLIPIDEMIA: ICD-10-CM

## 2020-07-27 DIAGNOSIS — I65.23 BILATERAL CAROTID ARTERY STENOSIS: ICD-10-CM

## 2020-07-27 PROCEDURE — 99214 OFFICE O/P EST MOD 30 MIN: CPT | Performed by: INTERNAL MEDICINE

## 2020-07-27 ASSESSMENT — PATIENT HEALTH QUESTIONNAIRE - PHQ9
SUM OF ALL RESPONSES TO PHQ9 QUESTIONS 1 AND 2: 0
CLINICAL INTERPRETATION OF PHQ9 SCORE: NO FURTHER SCREENING NEEDED
CLINICAL INTERPRETATION OF PHQ2 SCORE: NO FURTHER SCREENING NEEDED
SUM OF ALL RESPONSES TO PHQ9 QUESTIONS 1 AND 2: 0
1. LITTLE INTEREST OR PLEASURE IN DOING THINGS: NOT AT ALL
2. FEELING DOWN, DEPRESSED OR HOPELESS: NOT AT ALL

## 2020-08-24 DIAGNOSIS — E78.49 OTHER HYPERLIPIDEMIA: ICD-10-CM

## 2020-08-24 RX ORDER — ATORVASTATIN CALCIUM 40 MG/1
40 TABLET, FILM COATED ORAL DAILY
Qty: 90 TABLET | Refills: 1 | Status: SHIPPED | OUTPATIENT
Start: 2020-08-24 | End: 2021-03-06 | Stop reason: SDUPTHER

## 2020-11-03 ENCOUNTER — LAB ENCOUNTER (OUTPATIENT)
Dept: LAB | Facility: HOSPITAL | Age: 85
End: 2020-11-03
Attending: INTERNAL MEDICINE
Payer: MEDICARE

## 2020-11-03 DIAGNOSIS — E11.69 DIABETES MELLITUS ASSOCIATED WITH HORMONAL ETIOLOGY (HCC): Primary | ICD-10-CM

## 2020-11-03 LAB
ALBUMIN SERPL-MCNC: 3.8 G/DL
ALBUMIN/GLOB SERPL: 1.2 {RATIO}
ALP SERPL-CCNC: 86 U/L
ALT SERPL-CCNC: 35 UNITS/L
ANION GAP SERPL CALC-SCNC: 6 MMOL/L
AST SERPL-CCNC: 19 UNITS/L
BILIRUB SERPL-MCNC: 0.3 MG/DL
BUN SERPL-MCNC: 29 MG/DL
BUN/CREAT SERPL: 27.4
CALCIUM SERPL-MCNC: 9.2 MG/DL
CHLORIDE SERPL-SCNC: 111 MMOL/L
CHOLEST SERPL-MCNC: 88 MG/DL
CO2 SERPL-SCNC: 25 MMOL/L
CREAT SERPL-MCNC: 1.06 MG/DL
ESTIMATED AVERAGE GLUCOSE: 160
GLOBULIN SER-MCNC: 3.1 G/DL
GLUCOSE SERPL-MCNC: 149 MG/DL
HBA1C MFR BLD: 7.2 %
HDLC SERPL-MCNC: 41 MG/DL
LDLC SERPL CALC-MCNC: 37 MG/DL
LENGTH OF FAST TIME PATIENT: YES H
LENGTH OF FAST TIME PATIENT: YES H
NONHDLC SERPL-MCNC: 47 MG/DL
POTASSIUM SERPL-SCNC: 4.1 MMOL/L
PROT SERPL-MCNC: 6.9 G/DL
SODIUM SERPL-SCNC: 142 MMOL/L
TRIGL SERPL-MCNC: 52 MG/DL
VLDLC SERPL CALC-MCNC: 10 MG/DL

## 2020-11-03 PROCEDURE — 80061 LIPID PANEL: CPT

## 2020-11-03 PROCEDURE — 83036 HEMOGLOBIN GLYCOSYLATED A1C: CPT

## 2020-11-03 PROCEDURE — 80053 COMPREHEN METABOLIC PANEL: CPT

## 2020-11-03 PROCEDURE — 36415 COLL VENOUS BLD VENIPUNCTURE: CPT

## 2021-01-25 ENCOUNTER — APPOINTMENT (OUTPATIENT)
Dept: CARDIOLOGY | Age: 86
End: 2021-01-25

## 2021-02-01 ENCOUNTER — APPOINTMENT (OUTPATIENT)
Dept: CARDIOLOGY | Age: 86
End: 2021-02-01

## 2021-02-08 ENCOUNTER — OFFICE VISIT (OUTPATIENT)
Dept: INTERNAL MEDICINE CLINIC | Facility: CLINIC | Age: 86
End: 2021-02-08
Payer: MEDICARE

## 2021-02-08 VITALS
BODY MASS INDEX: 25.88 KG/M2 | SYSTOLIC BLOOD PRESSURE: 161 MMHG | DIASTOLIC BLOOD PRESSURE: 70 MMHG | HEART RATE: 67 BPM | WEIGHT: 131.81 LBS | HEIGHT: 60 IN

## 2021-02-08 DIAGNOSIS — I65.23 BILATERAL CAROTID ARTERY STENOSIS: ICD-10-CM

## 2021-02-08 DIAGNOSIS — K21.9 GASTROESOPHAGEAL REFLUX DISEASE, UNSPECIFIED WHETHER ESOPHAGITIS PRESENT: ICD-10-CM

## 2021-02-08 DIAGNOSIS — I25.10 CORONARY ARTERY DISEASE INVOLVING NATIVE CORONARY ARTERY OF NATIVE HEART WITHOUT ANGINA PECTORIS: ICD-10-CM

## 2021-02-08 DIAGNOSIS — E11.9 TYPE 2 DIABETES MELLITUS WITHOUT COMPLICATION, WITHOUT LONG-TERM CURRENT USE OF INSULIN (HCC): Primary | ICD-10-CM

## 2021-02-08 DIAGNOSIS — I10 ESSENTIAL HYPERTENSION: ICD-10-CM

## 2021-02-08 DIAGNOSIS — I45.10 RBBB (RIGHT BUNDLE BRANCH BLOCK): ICD-10-CM

## 2021-02-08 PROCEDURE — 3078F DIAST BP <80 MM HG: CPT | Performed by: INTERNAL MEDICINE

## 2021-02-08 PROCEDURE — 3077F SYST BP >= 140 MM HG: CPT | Performed by: INTERNAL MEDICINE

## 2021-02-08 PROCEDURE — 3008F BODY MASS INDEX DOCD: CPT | Performed by: INTERNAL MEDICINE

## 2021-02-08 PROCEDURE — 99203 OFFICE O/P NEW LOW 30 MIN: CPT | Performed by: INTERNAL MEDICINE

## 2021-02-08 RX ORDER — TERAZOSIN 2 MG/1
CAPSULE ORAL
COMMUNITY
Start: 2021-01-30 | End: 2021-08-09

## 2021-02-08 RX ORDER — ALLOPURINOL 300 MG/1
300 TABLET ORAL DAILY
COMMUNITY
Start: 2021-01-09 | End: 2021-08-09

## 2021-02-08 RX ORDER — CLOPIDOGREL BISULFATE 75 MG/1
75 TABLET ORAL DAILY
COMMUNITY
Start: 2020-11-28 | End: 2021-08-09

## 2021-02-08 RX ORDER — HYDROCHLOROTHIAZIDE 25 MG/1
12.5 TABLET ORAL DAILY
COMMUNITY
Start: 2020-12-28 | End: 2021-09-28

## 2021-02-08 RX ORDER — GLYBURIDE-METFORMIN HYDROCHLORIDE 2.5; 5 MG/1; MG/1
1 TABLET ORAL 2 TIMES DAILY WITH MEALS
COMMUNITY
End: 2021-09-26

## 2021-02-08 RX ORDER — METOPROLOL SUCCINATE 100 MG/1
100 TABLET, EXTENDED RELEASE ORAL DAILY
COMMUNITY
Start: 2021-01-04 | End: 2021-08-09

## 2021-02-08 RX ORDER — DORZOLAMIDE HYDROCHLORIDE AND TIMOLOL MALEATE 20; 5 MG/ML; MG/ML
1 SOLUTION/ DROPS OPHTHALMIC 2 TIMES DAILY
COMMUNITY
Start: 2021-01-23

## 2021-02-08 RX ORDER — AMLODIPINE BESYLATE 10 MG/1
TABLET ORAL
COMMUNITY
Start: 2020-12-08 | End: 2021-08-09

## 2021-02-08 RX ORDER — ATORVASTATIN CALCIUM 40 MG/1
40 TABLET, FILM COATED ORAL NIGHTLY
COMMUNITY
Start: 2020-11-23

## 2021-02-08 RX ORDER — GLIPIZIDE AND METFORMIN HCL 2.5; 5 MG/1; MG/1
1 TABLET, FILM COATED ORAL 2 TIMES DAILY WITH MEALS
COMMUNITY
Start: 2020-12-08 | End: 2021-02-08 | Stop reason: ALTCHOICE

## 2021-02-08 RX ORDER — LOSARTAN POTASSIUM 100 MG/1
100 TABLET ORAL DAILY
COMMUNITY
Start: 2021-01-09 | End: 2022-01-24

## 2021-02-10 ENCOUNTER — TELEPHONE (OUTPATIENT)
Dept: INTERNAL MEDICINE CLINIC | Facility: CLINIC | Age: 86
End: 2021-02-10

## 2021-02-10 DIAGNOSIS — I25.119 ATHEROSCLEROSIS OF NATIVE CORONARY ARTERY OF NATIVE HEART WITH ANGINA PECTORIS (HCC): ICD-10-CM

## 2021-02-10 DIAGNOSIS — Z01.00 EYE EXAM, ROUTINE: Primary | ICD-10-CM

## 2021-02-10 NOTE — TELEPHONE ENCOUNTER
Patient is requesting a referral for 3 visits with Dr. Oli Campoverde and a referral for a yearly eye exam with Dr. Devan Jesus.

## 2021-02-10 NOTE — TELEPHONE ENCOUNTER
Dr. Mihaela Mendez, patient is requesting a referral to Dr. Esperanza Munoz and Dr. Marylee Fine. Referral has been pended, please advise.

## 2021-02-11 DIAGNOSIS — Z23 NEED FOR VACCINATION: ICD-10-CM

## 2021-02-16 NOTE — TELEPHONE ENCOUNTER
Patient called to follow up on this. Informed that Cardiology referral is authorized but Opthalmology is not yet.

## 2021-02-17 RX ORDER — LOSARTAN POTASSIUM 100 MG/1
1 TABLET ORAL DAILY
COMMUNITY
Start: 2021-01-09

## 2021-02-20 PROBLEM — E11.9 TYPE 2 DIABETES MELLITUS WITHOUT COMPLICATION, WITHOUT LONG-TERM CURRENT USE OF INSULIN (HCC): Status: ACTIVE | Noted: 2021-02-20

## 2021-02-20 PROBLEM — I10 ESSENTIAL HYPERTENSION: Status: ACTIVE | Noted: 2021-02-20

## 2021-02-20 PROBLEM — I65.23 BILATERAL CAROTID ARTERY STENOSIS: Status: ACTIVE | Noted: 2021-02-20

## 2021-02-20 PROBLEM — I45.10 RBBB (RIGHT BUNDLE BRANCH BLOCK): Status: ACTIVE | Noted: 2021-02-20

## 2021-02-20 PROBLEM — K21.9 GASTROESOPHAGEAL REFLUX DISEASE: Status: ACTIVE | Noted: 2021-02-20

## 2021-02-20 PROBLEM — I25.10 CORONARY ARTERY DISEASE INVOLVING NATIVE CORONARY ARTERY OF NATIVE HEART WITHOUT ANGINA PECTORIS: Status: ACTIVE | Noted: 2021-02-20

## 2021-02-20 NOTE — PROGRESS NOTES
HPI:    Patient ID: Elif Alexander is a 82year old male. HPI  Patient is here to establish care with new primary care physician. Previous doctor I believe left the area. The patient brought in paper copy of his chart from the prior doctor. • losartan 100 MG Oral Tab      • allopurinol 300 MG Oral Tab Take 300 mg by mouth daily. • hydrochlorothiazide 25 MG Oral Tab Take 25 mg by mouth daily.      • amLODIPine Besylate 10 MG Oral Tab      • atorvastatin 40 MG Oral Tab Take 40 mg by mouth ASSESSMENT/PLAN:   1. Type 2 diabetes mellitus without complication, without long-term current use of insulin ([facility] [address]  Diabetes is reasonably well controlled. A1c back in November of last year was 7.2.   I spent time reviewing the previous doctors notes an

## 2021-02-22 ENCOUNTER — OFFICE VISIT (OUTPATIENT)
Dept: CARDIOLOGY | Age: 86
End: 2021-02-22

## 2021-02-22 VITALS
SYSTOLIC BLOOD PRESSURE: 152 MMHG | DIASTOLIC BLOOD PRESSURE: 62 MMHG | HEIGHT: 60 IN | OXYGEN SATURATION: 98 % | HEART RATE: 62 BPM | BODY MASS INDEX: 25.32 KG/M2 | WEIGHT: 129 LBS

## 2021-02-22 DIAGNOSIS — I10 ESSENTIAL HYPERTENSION: ICD-10-CM

## 2021-02-22 DIAGNOSIS — E78.49 OTHER HYPERLIPIDEMIA: ICD-10-CM

## 2021-02-22 DIAGNOSIS — I25.10 ATHEROSCLEROSIS OF NATIVE CORONARY ARTERY OF NATIVE HEART WITHOUT ANGINA PECTORIS: Primary | ICD-10-CM

## 2021-02-22 PROCEDURE — 3077F SYST BP >= 140 MM HG: CPT | Performed by: INTERNAL MEDICINE

## 2021-02-22 PROCEDURE — 99214 OFFICE O/P EST MOD 30 MIN: CPT | Performed by: INTERNAL MEDICINE

## 2021-02-22 PROCEDURE — 3078F DIAST BP <80 MM HG: CPT | Performed by: INTERNAL MEDICINE

## 2021-02-22 RX ORDER — GLIPIZIDE AND METFORMIN HCL 2.5; 5 MG/1; MG/1
1 TABLET, FILM COATED ORAL 2 TIMES DAILY WITH MEALS
COMMUNITY
Start: 2020-12-08

## 2021-02-22 RX ORDER — AMLODIPINE BESYLATE 10 MG/1
1 TABLET ORAL DAILY
COMMUNITY
Start: 2020-12-08

## 2021-02-22 SDOH — HEALTH STABILITY: MENTAL HEALTH: HOW OFTEN DO YOU HAVE A DRINK CONTAINING ALCOHOL?: NEVER

## 2021-02-22 ASSESSMENT — PATIENT HEALTH QUESTIONNAIRE - PHQ9
1. LITTLE INTEREST OR PLEASURE IN DOING THINGS: NOT AT ALL
SUM OF ALL RESPONSES TO PHQ9 QUESTIONS 1 AND 2: 0
SUM OF ALL RESPONSES TO PHQ9 QUESTIONS 1 AND 2: 0
CLINICAL INTERPRETATION OF PHQ2 SCORE: NO FURTHER SCREENING NEEDED
2. FEELING DOWN, DEPRESSED OR HOPELESS: NOT AT ALL
CLINICAL INTERPRETATION OF PHQ9 SCORE: NO FURTHER SCREENING NEEDED

## 2021-03-05 ENCOUNTER — TELEPHONE (OUTPATIENT)
Dept: INTERNAL MEDICINE CLINIC | Facility: CLINIC | Age: 86
End: 2021-03-05

## 2021-03-05 NOTE — TELEPHONE ENCOUNTER
Please see question below. [id number] [address] for patient to proceed with the vaccine while taking blood thinners? Current med listed in chart clopidogrel 75 mg daily, aspirin 81 mg daily.

## 2021-03-05 NOTE — TELEPHONE ENCOUNTER
Patient is scheduled to get the vaccine next week but he is on blood thinners. He wants to make sure that it is okay that he receives it while on this medication.

## 2021-03-06 ENCOUNTER — TELEPHONE (OUTPATIENT)
Dept: CARDIOLOGY | Age: 86
End: 2021-03-06

## 2021-03-06 DIAGNOSIS — E78.49 OTHER HYPERLIPIDEMIA: ICD-10-CM

## 2021-03-06 RX ORDER — ATORVASTATIN CALCIUM 40 MG/1
40 TABLET, FILM COATED ORAL DAILY
Qty: 90 TABLET | Refills: 3 | Status: SHIPPED | OUTPATIENT
Start: 2021-03-06

## 2021-04-05 NOTE — TELEPHONE ENCOUNTER
Per patient he needs Accu check Smart View test strips; also need Moo plus Lancets.  Both med not in patient current med list.

## 2021-04-06 RX ORDER — BLOOD SUGAR DIAGNOSTIC
STRIP MISCELLANEOUS
Qty: 100 STRIP | Refills: 1 | Status: SHIPPED | OUTPATIENT
Start: 2021-04-06 | End: 2021-06-14

## 2021-04-06 RX ORDER — GLUCOSAM/CHON-MSM1/C/MANG/BOSW 500-416.6
TABLET ORAL
Qty: 100 EACH | Refills: 1 | Status: SHIPPED | OUTPATIENT
Start: 2021-04-06 | End: 2021-06-14

## 2021-05-10 ENCOUNTER — OFFICE VISIT (OUTPATIENT)
Dept: INTERNAL MEDICINE CLINIC | Facility: CLINIC | Age: 86
End: 2021-05-10
Payer: MEDICARE

## 2021-05-10 VITALS
DIASTOLIC BLOOD PRESSURE: 60 MMHG | RESPIRATION RATE: 18 BRPM | HEIGHT: 60 IN | BODY MASS INDEX: 25.13 KG/M2 | HEART RATE: 62 BPM | SYSTOLIC BLOOD PRESSURE: 136 MMHG | WEIGHT: 128 LBS

## 2021-05-10 DIAGNOSIS — K21.9 GASTROESOPHAGEAL REFLUX DISEASE, UNSPECIFIED WHETHER ESOPHAGITIS PRESENT: ICD-10-CM

## 2021-05-10 DIAGNOSIS — I25.119 ATHEROSCLEROSIS OF NATIVE CORONARY ARTERY OF NATIVE HEART WITH ANGINA PECTORIS (HCC): ICD-10-CM

## 2021-05-10 DIAGNOSIS — E11.9 TYPE 2 DIABETES MELLITUS WITHOUT COMPLICATION, WITHOUT LONG-TERM CURRENT USE OF INSULIN (HCC): Primary | ICD-10-CM

## 2021-05-10 DIAGNOSIS — I10 ESSENTIAL HYPERTENSION: ICD-10-CM

## 2021-05-10 PROBLEM — E11.69 DIABETES MELLITUS ASSOCIATED WITH HORMONAL ETIOLOGY (HCC): Status: ACTIVE | Noted: 2021-05-10

## 2021-05-10 PROBLEM — G11.9 PRIMARY CEREBELLAR DEGENERATION (HCC): Status: ACTIVE | Noted: 2021-05-10

## 2021-05-10 PROCEDURE — 3075F SYST BP GE 130 - 139MM HG: CPT | Performed by: INTERNAL MEDICINE

## 2021-05-10 PROCEDURE — 3078F DIAST BP <80 MM HG: CPT | Performed by: INTERNAL MEDICINE

## 2021-05-10 PROCEDURE — 99213 OFFICE O/P EST LOW 20 MIN: CPT | Performed by: INTERNAL MEDICINE

## 2021-05-10 PROCEDURE — 3008F BODY MASS INDEX DOCD: CPT | Performed by: INTERNAL MEDICINE

## 2021-05-10 RX ORDER — GLIPIZIDE AND METFORMIN HCL 2.5; 5 MG/1; MG/1
1 TABLET, FILM COATED ORAL 2 TIMES DAILY WITH MEALS
COMMUNITY
Start: 2020-12-08 | End: 2021-08-09

## 2021-05-10 RX ORDER — GLIPIZIDE AND METFORMIN HCL 2.5; 5 MG/1; MG/1
1 TABLET, FILM COATED ORAL 2 TIMES DAILY WITH MEALS
COMMUNITY
Start: 2021-03-06

## 2021-05-10 RX ORDER — DOCUSATE SODIUM 50 MG AND SENNOSIDES 8.6 MG 8.6; 5 MG/1; MG/1
1 TABLET, FILM COATED ORAL DAILY
Qty: 90 TABLET | Refills: 1 | Status: SHIPPED | OUTPATIENT
Start: 2021-05-10 | End: 2021-08-09

## 2021-05-10 RX ORDER — DOCUSATE SODIUM 50 MG AND SENNOSIDES 8.6 MG 8.6; 5 MG/1; MG/1
1 TABLET, FILM COATED ORAL 2 TIMES DAILY
COMMUNITY
Start: 2021-04-06 | End: 2021-05-10

## 2021-05-10 NOTE — PROGRESS NOTES
HPI:    Patient ID: Valencia Payne is a 82year old male. HPI  Patient is here for follow-up on chronic medical issues as listed below. I saw him last and for the initial visit on February 8 of this year.   Blood pressure elevated in the office 1 tablet by mouth 2 (two) times daily with meals. • STIMULANT LAXATIVE 8.6-50 MG Oral Tab Take 1 tablet by mouth daily.  90 tablet 1   • Glucose Blood (ACCU-CHEK SMARTVIEW) In Vitro Strip Check glucose once a day 100 strip 1   • TRUEplus Lancets 33G Nelson There is no abdominal tenderness. Lymphadenopathy:      Cervical: No cervical adenopathy. Skin:     General: Skin is warm and dry. Findings: No rash. Neurological:      Mental Status: He is alert.    Psychiatric:         Behavior: Behavior normal

## 2021-05-14 ENCOUNTER — TELEPHONE (OUTPATIENT)
Dept: INTERNAL MEDICINE CLINIC | Facility: CLINIC | Age: 86
End: 2021-05-14

## 2021-05-14 NOTE — TELEPHONE ENCOUNTER
Mickey Mathew, was informed prescription has been filled and pt notified. Verified information with pt.

## 2021-05-14 NOTE — TELEPHONE ENCOUNTER
Patient stated pharmacy does not have script. Please advise. Medication Quantity Refills Start End   STIMULANT LAXATIVE 8.6-50 MG Oral Tab 90 tablet 1 5/10/2021    Sig:   Take 1 tablet by mouth daily.      Route: Sasha Ontiveros [place]     Order #: [id number]

## 2021-05-27 ENCOUNTER — TELEPHONE (OUTPATIENT)
Dept: INTERNAL MEDICINE CLINIC | Facility: CLINIC | Age: 86
End: 2021-05-27

## 2021-05-27 RX ORDER — AMLODIPINE BESYLATE 10 MG/1
10 TABLET ORAL DAILY
Qty: 90 TABLET | Refills: 0 | Status: CANCELLED | OUTPATIENT
Start: 2021-05-27

## 2021-05-27 NOTE — TELEPHONE ENCOUNTER
Jordi Mart is out of office today, can you assist with refill please   Unsure of dosing       Thank you

## 2021-05-27 NOTE — TELEPHONE ENCOUNTER
Patient is requesting a refill for medication and states has been out of medication for 3 days now and has already contacted the pharmacy.     •  amLODIPine Besylate 10 MG Oral Tab, , Disp: , Rfl:

## 2021-06-14 NOTE — TELEPHONE ENCOUNTER
Patient requesting refill on his diabetic supplies, patient states he checks his sugars 2 times a day and requesting a 90 day supply.        TRUEplus Lancets 33G Does not apply Misc 100 each 1 4/6/2021    Sig:   Check glucose once a day     Route:   (none)

## 2021-06-15 RX ORDER — BLOOD SUGAR DIAGNOSTIC
STRIP MISCELLANEOUS
Qty: 200 STRIP | Refills: 1 | Status: SHIPPED | OUTPATIENT
Start: 2021-06-15 | End: 2021-10-18

## 2021-06-15 RX ORDER — GLUCOSAM/CHON-MSM1/C/MANG/BOSW 500-416.6
TABLET ORAL
Qty: 200 EACH | Refills: 1 | Status: SHIPPED | OUTPATIENT
Start: 2021-06-15 | End: 2021-10-18

## 2021-07-22 ENCOUNTER — TELEPHONE (OUTPATIENT)
Dept: INTERNAL MEDICINE CLINIC | Facility: CLINIC | Age: 86
End: 2021-07-22

## 2021-07-22 NOTE — TELEPHONE ENCOUNTER
Spoke with  patient, informed that per record,both request were sent to [facility] on 6/15/21 for 3 months supply plus 1 refill. BUT patient stated that it is only 2 boxes. Informed  that will call pharmacy and clarify.   [facility] and spoke with Josi, stat

## 2021-07-22 NOTE — TELEPHONE ENCOUNTER
Per patient he is asking for his refill of his Strips and Lancets but he states that [facility] in pharmacy told him that he don't have refills anymore and patient wants 90 days supplies.     Current Outpatient Medications   Medication Sig Dispense Refill

## 2021-08-09 ENCOUNTER — OFFICE VISIT (OUTPATIENT)
Dept: INTERNAL MEDICINE CLINIC | Facility: CLINIC | Age: 86
End: 2021-08-09
Payer: MEDICARE

## 2021-08-09 VITALS
HEIGHT: 60 IN | SYSTOLIC BLOOD PRESSURE: 116 MMHG | DIASTOLIC BLOOD PRESSURE: 56 MMHG | TEMPERATURE: 98 F | BODY MASS INDEX: 24.74 KG/M2 | RESPIRATION RATE: 18 BRPM | HEART RATE: 60 BPM | WEIGHT: 126 LBS

## 2021-08-09 DIAGNOSIS — I10 ESSENTIAL HYPERTENSION: ICD-10-CM

## 2021-08-09 DIAGNOSIS — I25.119 ATHEROSCLEROSIS OF NATIVE CORONARY ARTERY OF NATIVE HEART WITH ANGINA PECTORIS (HCC): ICD-10-CM

## 2021-08-09 DIAGNOSIS — E11.9 TYPE 2 DIABETES MELLITUS WITHOUT COMPLICATION, WITHOUT LONG-TERM CURRENT USE OF INSULIN (HCC): Primary | ICD-10-CM

## 2021-08-09 DIAGNOSIS — K21.9 GASTROESOPHAGEAL REFLUX DISEASE, UNSPECIFIED WHETHER ESOPHAGITIS PRESENT: ICD-10-CM

## 2021-08-09 LAB
CARTRIDGE LOT#: 831 NUMERIC
HEMOGLOBIN A1C: 6.8 % (ref 4.3–5.6)

## 2021-08-09 PROCEDURE — 99214 OFFICE O/P EST MOD 30 MIN: CPT | Performed by: INTERNAL MEDICINE

## 2021-08-09 PROCEDURE — 3074F SYST BP LT 130 MM HG: CPT | Performed by: INTERNAL MEDICINE

## 2021-08-09 PROCEDURE — 3008F BODY MASS INDEX DOCD: CPT | Performed by: INTERNAL MEDICINE

## 2021-08-09 PROCEDURE — 83036 HEMOGLOBIN GLYCOSYLATED A1C: CPT | Performed by: INTERNAL MEDICINE

## 2021-08-09 PROCEDURE — 3078F DIAST BP <80 MM HG: CPT | Performed by: INTERNAL MEDICINE

## 2021-08-09 RX ORDER — AMLODIPINE BESYLATE 10 MG/1
10 TABLET ORAL DAILY
Qty: 90 TABLET | Refills: 1 | Status: SHIPPED | OUTPATIENT
Start: 2021-08-09 | End: 2021-08-30

## 2021-08-09 RX ORDER — CLOPIDOGREL BISULFATE 75 MG/1
75 TABLET ORAL DAILY
Qty: 90 TABLET | Refills: 1 | Status: SHIPPED | OUTPATIENT
Start: 2021-08-09

## 2021-08-09 RX ORDER — ALLOPURINOL 300 MG/1
300 TABLET ORAL DAILY
Qty: 90 TABLET | Refills: 1 | Status: SHIPPED | OUTPATIENT
Start: 2021-08-09 | End: 2022-01-10

## 2021-08-09 RX ORDER — TERAZOSIN 2 MG/1
2 CAPSULE ORAL NIGHTLY
Qty: 90 CAPSULE | Refills: 1 | Status: SHIPPED | OUTPATIENT
Start: 2021-08-09 | End: 2022-01-24

## 2021-08-09 RX ORDER — METOPROLOL SUCCINATE 100 MG/1
100 TABLET, EXTENDED RELEASE ORAL DAILY
Qty: 90 TABLET | Refills: 1 | Status: ON HOLD | OUTPATIENT
Start: 2021-08-09 | End: 2021-09-28

## 2021-08-09 RX ORDER — DOCUSATE SODIUM 50 MG AND SENNOSIDES 8.6 MG 8.6; 5 MG/1; MG/1
1 TABLET, FILM COATED ORAL DAILY
Qty: 90 TABLET | Refills: 1 | Status: SHIPPED | OUTPATIENT
Start: 2021-08-09 | End: 2021-11-10

## 2021-08-09 NOTE — PROGRESS NOTES
HPI:    Patient ID: Devon Anderson is a 82year old male. HPI  Patient is here for follow-up on chronic medical issues as listed below. Last seen here 3 months ago. At that time he was doing reasonably well. No changes made at that time.   Ad apply Misc Use to check glucose twice a day 200 each 1   • amLODIPine Besylate 10 MG Oral Tab Take 1 tablet (10 mg total) by mouth daily. 90 tablet 0   • glipiZIDE-metFORMIN HCl 2.5-500 MG Oral Tab Take 1 tablet by mouth 2 (two) times daily with meals. mass.      Tenderness: There is no abdominal tenderness. Musculoskeletal:      Right lower leg: No edema. Left lower leg: No edema. Lymphadenopathy:      Cervical: No cervical adenopathy. Skin:     General: Skin is warm and dry.       Findings: N by mouth daily. • metoprolol succinate 100 MG Oral Tablet 24 Hr 90 tablet 1     Sig: Take 1 tablet (100 mg total) by mouth daily. • clopidogrel 75 MG Oral Tab 90 tablet 1     Sig: Take 1 tablet (75 mg total) by mouth daily.    • terazosin 2 MG Oral Cap

## 2021-08-30 RX ORDER — AMLODIPINE BESYLATE 10 MG/1
10 TABLET ORAL DAILY
Qty: 90 TABLET | Refills: 1 | Status: SHIPPED | OUTPATIENT
Start: 2021-08-30 | End: 2021-09-28

## 2021-08-30 NOTE — TELEPHONE ENCOUNTER
Refill passed per [facility] protocol.     Requested Prescriptions   Pending Prescriptions Disp Refills    AMLODIPINE 10 MG Oral Tab [Pharmacy Med Name: AMLODIPINE BESYLATE 10MG TABLETS] 90 tablet 1     Sig: TAKE 1 TABLET(10 MG) BY MOUTH DAILY        Hypertensive Medications Protocol Passed - 8/30/2021 10:32 AM        Passed - CMP or BMP in past 12 months        Passed - Appointment in past 6 or next 3 months        Passed - GFR Non- > 50     Lab Results   Component Value Date    GFRNAA 65 11/03/2020                     Future Appointments         Provider Department Appt Notes    In 2 months Silver Morin MD [facility], [facility] 3 mo f/u            Recent Outpatient Visits              3 weeks ago Type 2 diabetes mellitus without complication, without long-term current use of insulin [facility])    [facility], Tricia Miramontes MD    Office Visit    3 months ago Type 2 diabetes mellitus without complication, without long-term current use of insulin [facility])    [place] [facility], [address], Brian Polanco MD    Office Visit    6 months ago Type 2 diabetes mellitus without complication, without long-term current use of insulin [facility])    [place] [facility] [place] [facility] [facility], [address], Brian Polanco MD    Office Visit    3 years ago Strep pharyngitis    [facility], APRN    Office Visit

## 2021-09-13 ENCOUNTER — TELEPHONE (OUTPATIENT)
Dept: INTERNAL MEDICINE CLINIC | Facility: CLINIC | Age: 86
End: 2021-09-13

## 2021-09-13 NOTE — TELEPHONE ENCOUNTER
Patient would like to know if he is able to eat fish today before going to the lab tomorrow for blood work.

## 2021-09-13 NOTE — TELEPHONE ENCOUNTER
Patient is returning phone call and wants to know if he could eat fish the day before he gets blood work done.

## 2021-09-14 ENCOUNTER — LAB ENCOUNTER (OUTPATIENT)
Dept: LAB | Facility: HOSPITAL | Age: 86
End: 2021-09-14
Attending: INTERNAL MEDICINE
Payer: MEDICARE

## 2021-09-14 DIAGNOSIS — E11.9 TYPE 2 DIABETES MELLITUS WITHOUT COMPLICATION, WITHOUT LONG-TERM CURRENT USE OF INSULIN (HCC): ICD-10-CM

## 2021-09-14 LAB
ALBUMIN SERPL-MCNC: 3.6 G/DL (ref 3.4–5)
ALBUMIN/GLOB SERPL: 1.1 {RATIO} (ref 1–2)
ALP LIVER SERPL-CCNC: 82 U/L
ALT SERPL-CCNC: 22 U/L
ANION GAP SERPL CALC-SCNC: 9 MMOL/L (ref 0–18)
AST SERPL-CCNC: 9 U/L (ref 15–37)
BASOPHILS # BLD AUTO: 0.05 X10(3) UL (ref 0–0.2)
BASOPHILS NFR BLD AUTO: 0.6 %
BILIRUB SERPL-MCNC: 0.3 MG/DL (ref 0.1–2)
BUN BLD-MCNC: 19 MG/DL (ref 7–18)
BUN/CREAT SERPL: 19.4 (ref 10–20)
CALCIUM BLD-MCNC: 8.9 MG/DL (ref 8.5–10.1)
CHLORIDE SERPL-SCNC: 109 MMOL/L (ref 98–112)
CHOLEST SMN-MCNC: 88 MG/DL (ref ?–200)
CO2 SERPL-SCNC: 23 MMOL/L (ref 21–32)
CREAT BLD-MCNC: 0.98 MG/DL
CREAT UR-SCNC: 50.8 MG/DL
DEPRECATED RDW RBC AUTO: 42.9 FL (ref 35.1–46.3)
EOSINOPHIL # BLD AUTO: 0.81 X10(3) UL (ref 0–0.7)
EOSINOPHIL NFR BLD AUTO: 9 %
ERYTHROCYTE [DISTWIDTH] IN BLOOD BY AUTOMATED COUNT: 14.8 % (ref 11–15)
GLOBULIN PLAS-MCNC: 3.2 G/DL (ref 2.8–4.4)
GLUCOSE BLD-MCNC: 110 MG/DL (ref 70–99)
HCT VFR BLD AUTO: 31.1 %
HDLC SERPL-MCNC: 37 MG/DL (ref 40–59)
HGB BLD-MCNC: 9.7 G/DL
IMM GRANULOCYTES # BLD AUTO: 0.03 X10(3) UL (ref 0–1)
IMM GRANULOCYTES NFR BLD: 0.3 %
LDLC SERPL CALC-MCNC: 35 MG/DL (ref ?–100)
LYMPHOCYTES # BLD AUTO: 1.56 X10(3) UL (ref 1–4)
LYMPHOCYTES NFR BLD AUTO: 17.4 %
M PROTEIN MFR SERPL ELPH: 6.8 G/DL (ref 6.4–8.2)
MCH RBC QN AUTO: 25.1 PG (ref 26–34)
MCHC RBC AUTO-ENTMCNC: 31.2 G/DL (ref 31–37)
MCV RBC AUTO: 80.4 FL
MICROALBUMIN UR-MCNC: 84 MG/DL
MICROALBUMIN/CREAT 24H UR-RTO: 1653.5 UG/MG (ref ?–30)
MONOCYTES # BLD AUTO: 0.74 X10(3) UL (ref 0.1–1)
MONOCYTES NFR BLD AUTO: 8.3 %
NEUTROPHILS # BLD AUTO: 5.77 X10 (3) UL (ref 1.5–7.7)
NEUTROPHILS # BLD AUTO: 5.77 X10(3) UL (ref 1.5–7.7)
NEUTROPHILS NFR BLD AUTO: 64.4 %
NONHDLC SERPL-MCNC: 51 MG/DL (ref ?–130)
OSMOLALITY SERPL CALC.SUM OF ELEC: 295 MOSM/KG (ref 275–295)
PATIENT FASTING Y/N/NP: YES
PATIENT FASTING Y/N/NP: YES
PLATELET # BLD AUTO: 208 10(3)UL (ref 150–450)
POTASSIUM SERPL-SCNC: 4 MMOL/L (ref 3.5–5.1)
RBC # BLD AUTO: 3.87 X10(6)UL
SODIUM SERPL-SCNC: 141 MMOL/L (ref 136–145)
TRIGL SERPL-MCNC: 72 MG/DL (ref 30–149)
TSI SER-ACNC: 1.32 MIU/ML (ref 0.36–3.74)
VIT B12 SERPL-MCNC: 213 PG/ML (ref 193–986)
VLDLC SERPL CALC-MCNC: 10 MG/DL (ref 0–30)
WBC # BLD AUTO: 9 X10(3) UL (ref 4–11)

## 2021-09-14 PROCEDURE — 85025 COMPLETE CBC W/AUTO DIFF WBC: CPT

## 2021-09-14 PROCEDURE — 82607 VITAMIN B-12: CPT

## 2021-09-14 PROCEDURE — 80053 COMPREHEN METABOLIC PANEL: CPT

## 2021-09-14 PROCEDURE — 80061 LIPID PANEL: CPT

## 2021-09-14 PROCEDURE — 36415 COLL VENOUS BLD VENIPUNCTURE: CPT

## 2021-09-14 PROCEDURE — 82570 ASSAY OF URINE CREATININE: CPT

## 2021-09-14 PROCEDURE — 84443 ASSAY THYROID STIM HORMONE: CPT

## 2021-09-14 PROCEDURE — 82043 UR ALBUMIN QUANTITATIVE: CPT

## 2021-09-22 ENCOUNTER — TELEPHONE (OUTPATIENT)
Dept: CARDIOLOGY | Age: 86
End: 2021-09-22

## 2021-09-26 ENCOUNTER — APPOINTMENT (OUTPATIENT)
Dept: GENERAL RADIOLOGY | Facility: HOSPITAL | Age: 86
DRG: 291 | End: 2021-09-26
Payer: MEDICARE

## 2021-09-26 ENCOUNTER — HOSPITAL ENCOUNTER (INPATIENT)
Facility: HOSPITAL | Age: 86
LOS: 2 days | Discharge: HOME | DRG: 291 | End: 2021-09-28
Attending: EMERGENCY MEDICINE | Admitting: HOSPITALIST
Payer: MEDICARE

## 2021-09-26 DIAGNOSIS — I50.31 ACUTE DIASTOLIC CONGESTIVE HEART FAILURE (HCC): ICD-10-CM

## 2021-09-26 DIAGNOSIS — D50.8 OTHER IRON DEFICIENCY ANEMIA: ICD-10-CM

## 2021-09-26 DIAGNOSIS — I50.9 ACUTE CONGESTIVE HEART FAILURE, UNSPECIFIED HEART FAILURE TYPE (HCC): Primary | ICD-10-CM

## 2021-09-26 PROBLEM — R73.9 HYPERGLYCEMIA: Status: ACTIVE | Noted: 2021-09-26

## 2021-09-26 PROCEDURE — 99223 1ST HOSP IP/OBS HIGH 75: CPT | Performed by: HOSPITALIST

## 2021-09-26 PROCEDURE — 71045 X-RAY EXAM CHEST 1 VIEW: CPT

## 2021-09-26 RX ORDER — ACETAMINOPHEN 325 MG/1
650 TABLET ORAL EVERY 4 HOURS PRN
Status: DISCONTINUED | OUTPATIENT
Start: 2021-09-26 | End: 2021-09-28

## 2021-09-26 RX ORDER — DOCUSATE SODIUM 100 MG/1
100 CAPSULE, LIQUID FILLED ORAL 2 TIMES DAILY
Status: DISCONTINUED | OUTPATIENT
Start: 2021-09-26 | End: 2021-09-28

## 2021-09-26 RX ORDER — METOCLOPRAMIDE HYDROCHLORIDE 5 MG/ML
10 INJECTION INTRAMUSCULAR; INTRAVENOUS EVERY 8 HOURS PRN
Status: DISCONTINUED | OUTPATIENT
Start: 2021-09-26 | End: 2021-09-26

## 2021-09-26 RX ORDER — MORPHINE SULFATE 2 MG/ML
2 INJECTION, SOLUTION INTRAMUSCULAR; INTRAVENOUS EVERY 2 HOUR PRN
Status: DISCONTINUED | OUTPATIENT
Start: 2021-09-26 | End: 2021-09-28

## 2021-09-26 RX ORDER — LATANOPROST 50 UG/ML
1 SOLUTION/ DROPS OPHTHALMIC EVERY EVENING
Status: DISCONTINUED | OUTPATIENT
Start: 2021-09-26 | End: 2021-09-28

## 2021-09-26 RX ORDER — MORPHINE SULFATE 4 MG/ML
4 INJECTION, SOLUTION INTRAMUSCULAR; INTRAVENOUS EVERY 2 HOUR PRN
Status: DISCONTINUED | OUTPATIENT
Start: 2021-09-26 | End: 2021-09-28

## 2021-09-26 RX ORDER — METOPROLOL SUCCINATE 100 MG/1
100 TABLET, EXTENDED RELEASE ORAL DAILY
Status: DISCONTINUED | OUTPATIENT
Start: 2021-09-27 | End: 2021-09-28

## 2021-09-26 RX ORDER — LOSARTAN POTASSIUM 100 MG/1
100 TABLET ORAL DAILY
Status: DISCONTINUED | OUTPATIENT
Start: 2021-09-27 | End: 2021-09-28

## 2021-09-26 RX ORDER — BISACODYL 10 MG
10 SUPPOSITORY, RECTAL RECTAL
Status: DISCONTINUED | OUTPATIENT
Start: 2021-09-26 | End: 2021-09-28

## 2021-09-26 RX ORDER — NITROGLYCERIN 0.4 MG/1
0.4 TABLET SUBLINGUAL EVERY 5 MIN PRN
Status: DISCONTINUED | OUTPATIENT
Start: 2021-09-26 | End: 2021-09-28

## 2021-09-26 RX ORDER — METOCLOPRAMIDE HYDROCHLORIDE 5 MG/ML
5 INJECTION INTRAMUSCULAR; INTRAVENOUS EVERY 8 HOURS PRN
Status: DISCONTINUED | OUTPATIENT
Start: 2021-09-26 | End: 2021-09-28

## 2021-09-26 RX ORDER — DEXTROSE MONOHYDRATE 25 G/50ML
50 INJECTION, SOLUTION INTRAVENOUS
Status: DISCONTINUED | OUTPATIENT
Start: 2021-09-26 | End: 2021-09-28

## 2021-09-26 RX ORDER — MORPHINE SULFATE 2 MG/ML
1 INJECTION, SOLUTION INTRAMUSCULAR; INTRAVENOUS EVERY 2 HOUR PRN
Status: DISCONTINUED | OUTPATIENT
Start: 2021-09-26 | End: 2021-09-28

## 2021-09-26 RX ORDER — HYDROCODONE BITARTRATE AND ACETAMINOPHEN 5; 325 MG/1; MG/1
1 TABLET ORAL EVERY 4 HOURS PRN
Status: DISCONTINUED | OUTPATIENT
Start: 2021-09-26 | End: 2021-09-28

## 2021-09-26 RX ORDER — CLOPIDOGREL BISULFATE 75 MG/1
75 TABLET ORAL DAILY
Status: DISCONTINUED | OUTPATIENT
Start: 2021-09-27 | End: 2021-09-28

## 2021-09-26 RX ORDER — ENOXAPARIN SODIUM 100 MG/ML
40 INJECTION SUBCUTANEOUS EVERY 24 HOURS
Status: DISCONTINUED | OUTPATIENT
Start: 2021-09-26 | End: 2021-09-28

## 2021-09-26 RX ORDER — ALLOPURINOL 300 MG/1
300 TABLET ORAL DAILY
Status: DISCONTINUED | OUTPATIENT
Start: 2021-09-27 | End: 2021-09-28

## 2021-09-26 RX ORDER — FUROSEMIDE 10 MG/ML
40 INJECTION INTRAMUSCULAR; INTRAVENOUS
Status: DISCONTINUED | OUTPATIENT
Start: 2021-09-26 | End: 2021-09-28

## 2021-09-26 RX ORDER — POLYETHYLENE GLYCOL 3350 17 G/17G
17 POWDER, FOR SOLUTION ORAL DAILY PRN
Status: DISCONTINUED | OUTPATIENT
Start: 2021-09-26 | End: 2021-09-28

## 2021-09-26 RX ORDER — HYDROCODONE BITARTRATE AND ACETAMINOPHEN 5; 325 MG/1; MG/1
2 TABLET ORAL EVERY 4 HOURS PRN
Status: DISCONTINUED | OUTPATIENT
Start: 2021-09-26 | End: 2021-09-28

## 2021-09-26 RX ORDER — ATORVASTATIN CALCIUM 40 MG/1
40 TABLET, FILM COATED ORAL NIGHTLY
Status: DISCONTINUED | OUTPATIENT
Start: 2021-09-26 | End: 2021-09-28

## 2021-09-26 RX ORDER — DORZOLAMIDE HYDROCHLORIDE AND TIMOLOL MALEATE 20; 5 MG/ML; MG/ML
1 SOLUTION/ DROPS OPHTHALMIC 2 TIMES DAILY
Status: DISCONTINUED | OUTPATIENT
Start: 2021-09-26 | End: 2021-09-28

## 2021-09-26 RX ORDER — ASPIRIN 81 MG/1
81 TABLET ORAL DAILY
Status: DISCONTINUED | OUTPATIENT
Start: 2021-09-27 | End: 2021-09-27

## 2021-09-26 RX ORDER — ACETAMINOPHEN 325 MG/1
650 TABLET ORAL EVERY 6 HOURS PRN
Status: DISCONTINUED | OUTPATIENT
Start: 2021-09-26 | End: 2021-09-28

## 2021-09-26 RX ORDER — FUROSEMIDE 10 MG/ML
40 INJECTION INTRAMUSCULAR; INTRAVENOUS ONCE
Status: COMPLETED | OUTPATIENT
Start: 2021-09-26 | End: 2021-09-26

## 2021-09-26 RX ORDER — TERAZOSIN 2 MG/1
2 CAPSULE ORAL NIGHTLY
Status: DISCONTINUED | OUTPATIENT
Start: 2021-09-26 | End: 2021-09-28

## 2021-09-26 RX ORDER — ONDANSETRON 2 MG/ML
4 INJECTION INTRAMUSCULAR; INTRAVENOUS EVERY 6 HOURS PRN
Status: DISCONTINUED | OUTPATIENT
Start: 2021-09-26 | End: 2021-09-28

## 2021-09-26 NOTE — ED QUICK NOTES
Orders for admission, patient is aware of plan and ready to go upstairs. Any questions, please call ED RN Jacy Rose at extension [phone].    Type of COVID test sent: Rapid  COVID Suspicion level: Low    Titratable drug(s) infusing: NA  Rate:    LOC at time of

## 2021-09-26 NOTE — ED PROVIDER NOTES
Patient Seen in: [facility] AND [facility] Emergency Department      History   Patient presents with:  Difficulty Breathing    Stated Complaint: sob    Subjective:   HPI    The patient is an 17-year-old male with a history of hypertension diabetes status post CA Rate and Rhythm: Normal rate and regular rhythm. Heart sounds: Normal heart sounds. No murmur heard. Pulmonary:      Effort: Pulmonary effort is normal. No accessory muscle usage or respiratory distress.       Breath sounds: Examination of the rig following orders were created for panel order CBC With Differential With Platelet.   Procedure                               Abnormality         Status                     ---------                               -----------         ------ basic health screening including reassessment of your blood pressure.       Medications Prescribed:  Current Discharge Medication List                          Hospital Problems             Present on Admission  Date Reviewed: 8/9/2021          ICD-10-CM No

## 2021-09-26 NOTE — ED INITIAL ASSESSMENT (HPI)
Patient to ed via private vehicle co of sob especially with exertion x Friday, went to clinic and had rapid test was negative, pcr pending

## 2021-09-26 NOTE — PROGRESS NOTES
[facility] Pharmacy Note:  Renal Dose Adjustment for Metoclopramide (REGLAN)    Mohamud Fitzpatrick has been prescribed Metoclopramide (REGLAN) 10 mg every 8 hours as needed for nausea/vomiting,.     Estimated Creatinine Clearance: 30.6 mL/min (based on SCr of

## 2021-09-26 NOTE — CONSULTS
[facility] Patient Status:  Emergency    1935 MRN [id number]   Location [address] Attending Taylor Jackson MD   Hosp Day # 0 PCP Servando Johnson MD medical record disease with bypass in 2009 who presents for evaluation of shortness of breath noted starting yesterday initially with a sore throat. The patient went to get Covid tested and was negative.   With progressive shortness of breath he was maryellen headaches, focal weaknesses or paresthesias  All other systems were reviewed and negative. Physical Exam:   Blood pressure 160/54, pulse 84, temperature 98.8 °F (37.1 °C), temperature source Oral, resp. rate 25, SpO2 94 %.   Intake/Output:           Last 2 10:55 AM     Finalized by (CST): Missy Maria MD on 9/26/2021 at 10:56 AM

## 2021-09-26 NOTE — PLAN OF CARE
Problem: Patient Centered Care  Goal: Patient preferences are identified and integrated in the patient's plan of care  Description: Interventions:  - What would you like us to know as we care for you? \"I understand a little bit of English. \"  - Provide strict intake and ouput  - daily weights  - See additional Care Plan goals for specific interventions  9/26/2021 1826 by Dre Knox, RN  Outcome: Progressing  9/26/2021 1821 by Dre Knox, RN  Outcome: Progressing     Problem: RESPIRATORY administer replacement therapy as ordered  Outcome: Progressing     Problem: GENITOURINARY - ADULT  Goal: Absence of urinary retention  Description: INTERVENTIONS:  - Assess patient’s ability to void and empty bladder  - Monitor intake/output and perform b

## 2021-09-26 NOTE — H&P
[facility] Patient Status:  Inpatient    1935 MRN [id number]   Location [facility] 3W/SW Attending Kt Davis, [facility] Day # 0 PCP Sharmila Jensen MD     Date:  20 (100 mg total) by mouth daily. clopidogrel 75 MG Oral Tab, Take 1 tablet (75 mg total) by mouth daily. terazosin 2 MG Oral Cap, Take 1 capsule (2 mg total) by mouth nightly. allopurinol 300 MG Oral Tab, Take 1 tablet (300 mg total) by mouth daily.   Gluc CREATSERUM 1.27 09/26/2021    BUN 23 (H) 09/26/2021     09/26/2021    K 4.3 09/26/2021     09/26/2021    CO2 22.0 09/26/2021     (H) 09/26/2021    CA 9.2 09/26/2021    ALB 3.6 09/14/2021    ALKPHO 82 09/14/2021    BILT 0.3 09/14/2021 Hospitalization - Initial Certification    Patient will require inpatient services that will reasonably be expected to span two midnight's based on the clinical documentation in H+P.    Based on patients current state of illness, I anticipate that, after [facility]

## 2021-09-27 ENCOUNTER — APPOINTMENT (OUTPATIENT)
Dept: CV DIAGNOSTICS | Facility: HOSPITAL | Age: 86
DRG: 291 | End: 2021-09-27
Attending: HOSPITALIST
Payer: MEDICARE

## 2021-09-27 PROCEDURE — 93306 TTE W/DOPPLER COMPLETE: CPT | Performed by: HOSPITALIST

## 2021-09-27 PROCEDURE — 99233 SBSQ HOSP IP/OBS HIGH 50: CPT | Performed by: HOSPITALIST

## 2021-09-27 RX ORDER — UBIDECARENONE 75 MG
100 CAPSULE ORAL DAILY
Status: DISCONTINUED | OUTPATIENT
Start: 2021-09-27 | End: 2021-09-28

## 2021-09-27 RX ORDER — POTASSIUM CHLORIDE 20 MEQ/1
40 TABLET, EXTENDED RELEASE ORAL EVERY 4 HOURS
Status: COMPLETED | OUTPATIENT
Start: 2021-09-27 | End: 2021-09-27

## 2021-09-27 RX ORDER — MAGNESIUM OXIDE 400 MG (241.3 MG MAGNESIUM) TABLET
400 TABLET ONCE
Status: COMPLETED | OUTPATIENT
Start: 2021-09-27 | End: 2021-09-27

## 2021-09-27 NOTE — PAYOR COMM NOTE
--------------  ADMISSION REVIEW     Christi Acosta MA O  Subscriber #:  [id number]  Authorization Number: [id number]       ED Provider Notes        History   Patient presents with:  Difficulty Breathing    Stated Complaint: sob    Subjective:   HPI    The Effort: Pulmonary effort is normal. No accessory muscle usage or respiratory distress. Breath sounds: Examination of the right-lower field reveals rales. Examination of the left-lower field reveals rales. Rales present.    Abdominal:      General: Sarah dana       Patient with new onset congestive heart failure Lasix given in the ED vital signs stable prior to admission. Case discussed with hospitalist and BRENT. Sonia Montana   Patient will be admitted for IV diuresis      MDM      Pulse Ox: 94%, Normal, room air Symmetrical movement on inspiration  HEART:  S1 and S2 heard. RRR   LUNGS:  +bibasilar crackles   ABDOMEN: Soft and non-tender. Bowel sounds were present. MUSCULOSKELETAL:  There was no deformity. There was full range of motion in all the extremities. anticipate that, after discharge, patient will require TBD.        9/26 CARDS  Acute congestive heart failure ([facility] [address]  Patient with findings suggestive of acute congestive heart failure of new onset.   Will admit to the hospital for further evaluation and diu CA 8.7 09/27/2021           MEDICATIONS ADMINISTERED IN LAST 1 DAY:  allopurinol (ZYLOPRIM) tab 300 mg     Date Action Dose Route User    9/27/2021 0842 Given  Oral Ruther MARCELLE Painting      aspirin EC tab 81 mg     Date Action Dose Route User    9/27/2021 126 Action Dose Route User    9/27/2021 8820 Given  Oral Vale Brennan RN      potassium chloride (K-DUR M20) CR tab 40 mEq     Date Action Dose Route User    9/27/2021 6402 Given  Oral Vale Brennan RN      terazosin (HYTRIN) cap 2 mg     Date Action Dose Rou

## 2021-09-27 NOTE — PROGRESS NOTES
[facility] Patient Status:  Inpatient    1935 MRN [id number]   Location [facility] 3W/SW Attending Khoa Tavarez MD   [facility] Day nondistended,  bowel sounds present  Ext:  no clubbing, no cyanosis,no edema  Neuro: no focal deficits  Skin: no rashes or lesions        Scheduled Meds:   • potassium chloride  40 mEq Oral Q4H   • allopurinol  300 mg Oral Daily   • aspirin  81 mg Oral Demetrice Age with ECG of 01/18/2020 14:05:03 PVC's have increased Electronically signed on 09/26/2021 at 11:36 by MARICRUZ Gutierrez MD  [address] [place] [address] 2*  9/27/2021

## 2021-09-27 NOTE — PLAN OF CARE
Room air, denies SOB, pulse ox on earlobe for best readings. Echo done today, see results. Plan stress tomorrow, cardiac stress diet.     Problem: Patient Centered Care  Goal: Patient preferences are identified and integrated in the patient's plan of care ventilation and oxygenation  Description: INTERVENTIONS:  - Assess for changes in respiratory status  - Assess for changes in mentation and behavior  - Position to facilitate oxygenation and minimize respiratory effort  - Oxygen supplementation based on ox urinary retention protocol/standard of care  - Consider collaborating with pharmacy to review patient's medication profile  - Implement strategies to promote bladder emptying  Outcome: Progressing

## 2021-09-27 NOTE — CARDIAC REHAB
CARDIAC REHAB HEART FAILURE EDUCATION    Handouts provided and reviewed: CHF Booklet. Activity: Up in room         Disease Process: Disease process reviewed: with patients daughter.  Understanding HF, recognizing HF symptoms, HF medications, symptom tra

## 2021-09-27 NOTE — PROGRESS NOTES
[facility]    Progress Note    Husam Rob Patient Status:  Inpatient    1935 MRN [id number]   Location [facility] 3W/SW Attending Farhan Prado MD   Hosp Day # 1 PCP Rashid Hernandez MD       Subjective:   Son [place] 3.4 09/26/2021    ALKPHO 90 09/26/2021    BILT 0.5 09/26/2021    TP 6.8 09/26/2021    AST 11 (L) 09/26/2021    ALT 22 09/26/2021    TSH 1.320 09/14/2021    MG 1.8 09/27/2021    TROP <0.045 09/26/2021    B12 213 09/14/2021       XR CHEST AP PORTABLE  (CPT=7 re: treatment plan and work up.       Cris Whitt MD

## 2021-09-28 ENCOUNTER — APPOINTMENT (OUTPATIENT)
Dept: NUCLEAR MEDICINE | Facility: HOSPITAL | Age: 86
DRG: 291 | End: 2021-09-28
Attending: INTERNAL MEDICINE
Payer: MEDICARE

## 2021-09-28 ENCOUNTER — APPOINTMENT (OUTPATIENT)
Dept: CV DIAGNOSTICS | Facility: HOSPITAL | Age: 86
DRG: 291 | End: 2021-09-28
Attending: INTERNAL MEDICINE
Payer: MEDICARE

## 2021-09-28 VITALS
TEMPERATURE: 98 F | BODY MASS INDEX: 24 KG/M2 | SYSTOLIC BLOOD PRESSURE: 159 MMHG | RESPIRATION RATE: 16 BRPM | HEART RATE: 69 BPM | WEIGHT: 122.38 LBS | OXYGEN SATURATION: 95 % | DIASTOLIC BLOOD PRESSURE: 55 MMHG

## 2021-09-28 PROCEDURE — 93017 CV STRESS TEST TRACING ONLY: CPT | Performed by: INTERNAL MEDICINE

## 2021-09-28 PROCEDURE — 99233 SBSQ HOSP IP/OBS HIGH 50: CPT | Performed by: HOSPITALIST

## 2021-09-28 PROCEDURE — 78452 HT MUSCLE IMAGE SPECT MULT: CPT | Performed by: INTERNAL MEDICINE

## 2021-09-28 RX ORDER — MELATONIN
325
Qty: 30 TABLET | Refills: 0 | Status: SHIPPED | OUTPATIENT
Start: 2021-09-29 | End: 2021-11-10

## 2021-09-28 RX ORDER — FUROSEMIDE 40 MG/1
20 TABLET ORAL DAILY
Qty: 30 TABLET | Refills: 0 | Status: SHIPPED | OUTPATIENT
Start: 2021-09-28 | End: 2021-11-10

## 2021-09-28 RX ORDER — METOPROLOL SUCCINATE 100 MG/1
150 TABLET, EXTENDED RELEASE ORAL DAILY
Qty: 90 TABLET | Refills: 1 | Status: SHIPPED | OUTPATIENT
Start: 2021-09-28

## 2021-09-28 RX ORDER — MELATONIN
1000 DAILY
Qty: 30 TABLET | Refills: 0 | Status: SHIPPED | OUTPATIENT
Start: 2021-09-28

## 2021-09-28 NOTE — PROGRESS NOTES
[address]      Chief complaint:  Dyspnea    Subjective:  Dyspnea resolved no chest pain    Objective:  /67 (BP Location: Right arm)   Pulse 83   Temp 98.3 °F (36.8 °C) (Oral)   Resp 16   Wt 122 lb 6.4 oz (55.5 kg)   SpO2 95% DC,was on hydrochlorothiazide at home  · CAD s/p CABG Nuclear today normal perfusion  · Carotid disease s/p CEA  Anemia Hgb 9.2 off ASA  · -160s  · PVCs will increase Metoprolol    Plan:   · Reviewed with Dr. Larry Olivo for DC today  · Lasix 20 mg

## 2021-09-28 NOTE — DIETARY NOTE
NUTRITION EDUCATION NOTE    Consult received for heart healthy diet education due to chf. Pt family/spouse in room during visit. RD reviewed and provided heart healthy diet educational handout. Pt verbalized understanding. Questions answered.  See education

## 2021-09-28 NOTE — PLAN OF CARE
Problem: Patient Centered Care  Goal: Patient preferences are identified and integrated in the patient's plan of care  Description: Interventions:  - What would you like us to know as we care for you? \"I understand a little bit of English. \"  - Provide status  - Assess for changes in mentation and behavior  - Position to facilitate oxygenation and minimize respiratory effort  - Oxygen supplementation based on oxygen saturation or ABGs  - Provide Smoking Cessation handout, if applicable  - Encourage bron needed  - Follow urinary retention protocol/standard of care  - Consider collaborating with pharmacy to review patient's medication profile  - Implement strategies to promote bladder emptying  Outcome: Progressing  Note: Voiding without problems   Good uri

## 2021-09-28 NOTE — DISCHARGE SUMMARY
[facility]    Discharge Summary    [address] Patient Status:  Inpatient    1935 MRN [id number]   Location [facility] 3W/SW Attending Jerilyn [place] MD   Hosp Day # 2 PCP Jackie Martinez MD     Date of Admiss hypertension, diabetes, peripheral vascular disease. He now presents with worsening shortness of breath over 2 days. Patient usually sleeps with a pillow propped up, he has had progressive shortness of breath and increasing edema.   He now feels shortness CHANGE how you take these medications      Instructions Prescription details   metoprolol succinate 100 MG Tb24  Commonly known as: Toprol XL  What changed: how much to take      Take 1.5 tablets (150 mg total) by mouth daily.    Quantity: 90 tablet  Refi Where to Get Your Medications      These medications were sent to [facility] [address], [address]  [address] [facility], [phone], [place] , [facility] [address]    Hours: 24-h

## 2021-09-28 NOTE — PLAN OF CARE
No new complaints. Stress test today, see results. Expect discharge home when medically clear. Cards will change to PO diuretic at discharge.     Problem: Patient Centered Care  Goal: Patient preferences are identified and integrated in the patient's plan o ventilation and oxygenation  Description: INTERVENTIONS:  - Assess for changes in respiratory status  - Assess for changes in mentation and behavior  - Position to facilitate oxygenation and minimize respiratory effort  - Oxygen supplementation based on ox urinary retention protocol/standard of care  - Consider collaborating with pharmacy to review patient's medication profile  - Implement strategies to promote bladder emptying  Outcome: Progressing

## 2021-09-29 ENCOUNTER — TELEPHONE (OUTPATIENT)
Dept: INTERNAL MEDICINE CLINIC | Facility: CLINIC | Age: 86
End: 2021-09-29

## 2021-09-29 ENCOUNTER — PATIENT OUTREACH (OUTPATIENT)
Dept: CASE MANAGEMENT | Age: 86
End: 2021-09-29

## 2021-09-29 DIAGNOSIS — I50.9 ACUTE CONGESTIVE HEART FAILURE, UNSPECIFIED HEART FAILURE TYPE (HCC): ICD-10-CM

## 2021-09-29 DIAGNOSIS — Z02.9 ENCOUNTERS FOR UNSPECIFIED ADMINISTRATIVE PURPOSE: ICD-10-CM

## 2021-09-29 PROCEDURE — 1111F DSCHRG MED/CURRENT MED MERGE: CPT

## 2021-09-29 NOTE — TELEPHONE ENCOUNTER
Please call patient and assist in scheduling appoiontment with Dr Lotus Nelson or [facility] Zohra APRN, thanks.

## 2021-09-29 NOTE — PLAN OF CARE
Discharge instructions, follow up appointments, and medications reviewed with patient, as well as patient's wife and daughter with patient's permission. All questions answered. Dietician's teaching reinforced.  Patient to received venofer drip prior to disc Jose Osorio, RN  Outcome: Adequate for Discharge  9/28/2021 1323 by Jose Osorio RN  Outcome: Progressing  Goal: Patient/Family Short Term Goal  Description: Patient's Short Term Goal: Breathe better    Interventions:   - Supplemental O2 prn  - Monitor v balance, assess for edema, trend weights  9/28/2021 1914 by Socorro Schuster, RN  Outcome: Adequate for Discharge  9/28/2021 1323 by Socorro Schuster RN  Outcome: Progressing  Goal: Absence of cardiac arrhythmias or at baseline  Description: INTERVENTIONS:  - Con Spironolactone Pregnancy And Lactation Text: This medication can cause feminization of the male fetus and should be avoided during pregnancy. The active metabolite is also found in breast milk.

## 2021-09-29 NOTE — PROGRESS NOTES
Initial Post Discharge Follow Up   Discharge Date: 9/28/21  Contact Date: 9/29/2021    Consent Verification:  Assessment Completed With: Patient  HIPAA Verified?   Yes    Discharge Dx:     Acute diastolic heart failure    Was TCC ordered: no      General tablet 0   • STIMULANT LAXATIVE 8.6-50 MG Oral Tab Take 1 tablet by mouth daily. 90 tablet 1   • clopidogrel 75 MG Oral Tab Take 1 tablet (75 mg total) by mouth daily. 90 tablet 1   • terazosin 2 MG Oral Cap Take 1 capsule (2 mg total) by mouth nightly.  80 No    Financial Resource Strain: Low Risk       Difficulty of Paying Living Expenses: Not hard at all        DX: specifics:  CHF:   With your CHF diagnosis weighing yourself is very important  How often are you weighing yourself?  Daily  Is there any reason with patient. Patient reports doing well since getting home. Patient states that he did get a headache after the iron infusion. Patient states that he took Tylenol and this helped with the headache. Patient denies any additional headache today.   Janine

## 2021-09-29 NOTE — PAYOR COMM NOTE
Discharge Notification    Patient Name: Price Acevedo  Payor: [facility]  Subscriber #: [id number]  Authorization Number: [id number]  Admit Date/Time: 9/26/2021 10:34 AM  Discharge Date/Time: 9/28/2021 7:52 PM

## 2021-09-29 NOTE — TELEPHONE ENCOUNTER
Spoke to pt for TCM today. Pt does not have HFU appt scheduled at this time. TCM/HFU appt recommended by 10/5/21 as pt is a high risk for readmission. Please advise.     BOOK BY DATE (last date for TCM): 10/12/21    TRIAGE:  Please f/u with pt and try to

## 2021-09-30 ENCOUNTER — TELEPHONE (OUTPATIENT)
Dept: INTERNAL MEDICINE CLINIC | Facility: CLINIC | Age: 86
End: 2021-09-30

## 2021-09-30 NOTE — TELEPHONE ENCOUNTER
Patient was told while he was in the hospital to use less or no salt in his diet. He was told he could use \"Garner\" Brand. He found a Garner salt that said \"50% Less Sodium\". Can use this? He is also calling his Cardiologist to confirm.        Please Ad

## 2021-10-01 NOTE — TELEPHONE ENCOUNTER
Based on his recent hospitalization, he definitely needs to cut back on salt intake. He can use a salt substitute that has less of sodium.

## 2021-10-05 ENCOUNTER — OFFICE VISIT (OUTPATIENT)
Dept: INTERNAL MEDICINE CLINIC | Facility: CLINIC | Age: 86
End: 2021-10-05
Payer: MEDICARE

## 2021-10-05 ENCOUNTER — LAB ENCOUNTER (OUTPATIENT)
Dept: LAB | Facility: HOSPITAL | Age: 86
End: 2021-10-05
Attending: NURSE PRACTITIONER
Payer: MEDICARE

## 2021-10-05 VITALS
HEART RATE: 58 BPM | BODY MASS INDEX: 23.66 KG/M2 | HEIGHT: 60 IN | WEIGHT: 120.5 LBS | DIASTOLIC BLOOD PRESSURE: 56 MMHG | SYSTOLIC BLOOD PRESSURE: 130 MMHG | TEMPERATURE: 97 F

## 2021-10-05 DIAGNOSIS — I50.31 ACUTE DIASTOLIC CONGESTIVE HEART FAILURE (HCC): ICD-10-CM

## 2021-10-05 DIAGNOSIS — I50.9 ACUTE CONGESTIVE HEART FAILURE, UNSPECIFIED HEART FAILURE TYPE (HCC): Primary | ICD-10-CM

## 2021-10-05 DIAGNOSIS — I50.9 ACUTE CONGESTIVE HEART FAILURE, UNSPECIFIED HEART FAILURE TYPE (HCC): ICD-10-CM

## 2021-10-05 DIAGNOSIS — D50.8 OTHER IRON DEFICIENCY ANEMIA: ICD-10-CM

## 2021-10-05 PROCEDURE — 99214 OFFICE O/P EST MOD 30 MIN: CPT | Performed by: NURSE PRACTITIONER

## 2021-10-05 PROCEDURE — 80053 COMPREHEN METABOLIC PANEL: CPT

## 2021-10-05 PROCEDURE — 85025 COMPLETE CBC W/AUTO DIFF WBC: CPT

## 2021-10-05 PROCEDURE — 3008F BODY MASS INDEX DOCD: CPT | Performed by: NURSE PRACTITIONER

## 2021-10-05 PROCEDURE — 3078F DIAST BP <80 MM HG: CPT | Performed by: NURSE PRACTITIONER

## 2021-10-05 PROCEDURE — 36415 COLL VENOUS BLD VENIPUNCTURE: CPT

## 2021-10-05 PROCEDURE — 3075F SYST BP GE 130 - 139MM HG: CPT | Performed by: NURSE PRACTITIONER

## 2021-10-05 NOTE — PROGRESS NOTES
HPI:    Patient ID: Rajwinder Rivers is a 82year old male.   Date of Admission: 9/26/2021   Disposition: Home or Self Care      Date of Discharge: 09/28/21        Admitting Diagnosis: Other iron deficiency anemia [D50.8]  Acute congestive heart fail Negative for back pain and joint swelling. Skin: Negative for rash. Allergic/Immunologic: Negative for environmental allergies. Neurological: Negative for weakness, numbness and headaches. Hematological: Does not bruise/bleed easily.    Psychiatric/ and nursing note reviewed. Constitutional:       Appearance: Normal appearance. He is well-developed. HENT:      Head: Normocephalic. Cardiovascular:      Rate and Rhythm: Normal rate and regular rhythm. Heart sounds: Normal heart sounds.  No mur 31.0 10/05/2021    RDW 15.1 (H) 10/05/2021    .0 10/05/2021    MPV 9.9 11/25/2018      Lab Results   Component Value Date     (H) 10/05/2021    BUN 34 (H) 10/05/2021    BUNCREA 28.6 (H) 10/05/2021    CREATSERUM 1.19 10/05/2021    ANIONGAP 8 1 large bruise in the right flank area. Patient does ot recall ay injury. Patient was on aspirin and plavix and they stopped the aspirin due to the anemia. Plan  1) CBC, CMP  2) Patient will be called this evening.  If low hemoglobin and lightheaded he tay

## 2021-10-05 NOTE — ASSESSMENT & PLAN NOTE
Patient is here with his daughter. He was noted to have anemia in the hospital. Daughter stated they checked his stool of OB and it was negative for blood. He was started on Iron. On examination daughter and I found a large bruise in the right flank area.

## 2021-10-05 NOTE — ASSESSMENT & PLAN NOTE
Patient seen today post hospitalization for CHF. BNP was 6969. Exam today- lung clear, no peripheral edema. Patient states he is dehydrated. Chart reviewed- Last hemoglobin 9.2. Plan  1) CBC, CMP today.   2) BUN 34- Patient slightly dehydrated-Patient

## 2021-10-18 RX ORDER — GLUCOSAM/CHON-MSM1/C/MANG/BOSW 500-416.6
TABLET ORAL
Qty: 200 EACH | Refills: 1 | Status: SHIPPED | OUTPATIENT
Start: 2021-10-18 | End: 2022-02-17

## 2021-10-18 RX ORDER — BLOOD SUGAR DIAGNOSTIC
STRIP MISCELLANEOUS
Qty: 200 STRIP | Refills: 1 | Status: SHIPPED | OUTPATIENT
Start: 2021-10-18 | End: 2022-05-16

## 2021-10-18 NOTE — TELEPHONE ENCOUNTER
Refill passed per [facility] protocol.      Requested Prescriptions   Pending Prescriptions Disp Refills    Glucose Blood (ACCU-CHEK SMARTVIEW) In Vitro Strip 200 strip 1     Sig: Use to check glucose twice a day        Diabetic Supplies Protocol Passed - 10/18/2021  2:48 PM        Passed - Appointment in past 12 or next 3 months           TRUEplus Lancets 33G Does not apply Misc 200 each 1     Sig: Use to check glucose twice a day        Diabetic Supplies Protocol Passed - 10/18/2021  2:48 PM        Passed - Appointment in past 12 or next 3 months               Future Appointments         Provider Department Appt Notes    In 3 weeks Brenden Hardwick MD [facility], [address], [address] 3 mo f/u             Recent Outpatient Visits              1 week ago Acute congestive heart failure, unspecified heart failure type [facility])    [facility], [address], Darrell Villa, APRN    Office Visit    2 months ago Type 2 diabetes mellitus without complication, without long-term current use of insulin [facility])    [facility], [address], Shelby Polanco MD    Office Visit    5 months ago Type 2 diabetes mellitus without complication, without long-term current use of insulin [facility])    [facility], [address], Shelby Polanco MD    Office Visit    8 months ago Type 2 diabetes mellitus without complication, without long-term current use of insulin [facility])    [facility], [address], Shelby Polanco MD    Office Visit    3 years ago Strep pharyngitis    [id number] [facility] [address] [facility], APRN    Office Visit

## 2021-10-18 NOTE — TELEPHONE ENCOUNTER
Patient is requesting refill on the following to preferred [address] [place] [address] on file:    Glucose blood Accu-check [place]    TRUEplus Lancets      Please call patient to confirm.

## 2021-10-20 ENCOUNTER — TELEPHONE (OUTPATIENT)
Dept: INTERNAL MEDICINE CLINIC | Facility: CLINIC | Age: 86
End: 2021-10-20

## 2021-10-20 NOTE — TELEPHONE ENCOUNTER
Patient has question about his upcoming appointment for flu shot scheduled Thursday at [place]. Patient had both his covid shots, last one on 4/9. Asking if he should push out further or if its safe to have flu inj. Please call at [phone],thanks.

## 2021-10-22 NOTE — TELEPHONE ENCOUNTER
Based on the information in the message, it is safe for the patient to receive his flu shot as scheduled

## 2021-11-10 ENCOUNTER — PATIENT OUTREACH (OUTPATIENT)
Dept: CASE MANAGEMENT | Age: 86
End: 2021-11-10

## 2021-11-10 ENCOUNTER — OFFICE VISIT (OUTPATIENT)
Dept: INTERNAL MEDICINE CLINIC | Facility: CLINIC | Age: 86
End: 2021-11-10
Payer: MEDICARE

## 2021-11-10 VITALS
SYSTOLIC BLOOD PRESSURE: 124 MMHG | RESPIRATION RATE: 18 BRPM | DIASTOLIC BLOOD PRESSURE: 58 MMHG | BODY MASS INDEX: 23.66 KG/M2 | TEMPERATURE: 99 F | HEIGHT: 60 IN | HEART RATE: 58 BPM | WEIGHT: 120.5 LBS

## 2021-11-10 DIAGNOSIS — E11.9 TYPE 2 DIABETES MELLITUS WITHOUT COMPLICATION, WITHOUT LONG-TERM CURRENT USE OF INSULIN (HCC): ICD-10-CM

## 2021-11-10 DIAGNOSIS — I50.9 ACUTE CONGESTIVE HEART FAILURE, UNSPECIFIED HEART FAILURE TYPE (HCC): ICD-10-CM

## 2021-11-10 DIAGNOSIS — D50.8 OTHER IRON DEFICIENCY ANEMIA: ICD-10-CM

## 2021-11-10 DIAGNOSIS — I25.119 ATHEROSCLEROSIS OF NATIVE CORONARY ARTERY OF NATIVE HEART WITH ANGINA PECTORIS (HCC): ICD-10-CM

## 2021-11-10 DIAGNOSIS — I10 ESSENTIAL HYPERTENSION: Primary | ICD-10-CM

## 2021-11-10 PROCEDURE — 99214 OFFICE O/P EST MOD 30 MIN: CPT | Performed by: INTERNAL MEDICINE

## 2021-11-10 PROCEDURE — 3008F BODY MASS INDEX DOCD: CPT | Performed by: INTERNAL MEDICINE

## 2021-11-10 PROCEDURE — 3074F SYST BP LT 130 MM HG: CPT | Performed by: INTERNAL MEDICINE

## 2021-11-10 PROCEDURE — 3078F DIAST BP <80 MM HG: CPT | Performed by: INTERNAL MEDICINE

## 2021-11-10 RX ORDER — DOCUSATE SODIUM 50 MG AND SENNOSIDES 8.6 MG 8.6; 5 MG/1; MG/1
1 TABLET, FILM COATED ORAL DAILY
Qty: 90 TABLET | Refills: 1 | Status: SHIPPED | OUTPATIENT
Start: 2021-11-10

## 2021-11-10 RX ORDER — MELATONIN
325
Qty: 90 TABLET | Refills: 1 | Status: SHIPPED | OUTPATIENT
Start: 2021-11-10

## 2021-11-10 RX ORDER — FUROSEMIDE 40 MG/1
20 TABLET ORAL DAILY
Qty: 90 TABLET | Refills: 1 | Status: SHIPPED | OUTPATIENT
Start: 2021-11-10

## 2021-11-10 NOTE — PROGRESS NOTES
HPI:    Patient ID: Griselda Harlem is a 82year old male. HPI  Patient is here for follow-up on chronic medical issues as listed below. I last saw him in early August but he was doing well at that time. Diabetes was well controlled.   He was i Heart Disorder Brother       Social History    Tobacco Use      Smoking status: Former Smoker      Smokeless tobacco: Never Used    Vaping Use      Vaping Use: Never used    Alcohol use: Never    Drug use: Never         Review of Systems          Current O normal.      Mouth/Throat:      Pharynx: No oropharyngeal exudate. Eyes:      Conjunctiva/sclera: Conjunctivae normal.   Cardiovascular:      Rate and Rhythm: Normal rate and regular rhythm. Heart sounds: Normal heart sounds. No murmur heard.       P Visit:  Requested Prescriptions      No prescriptions requested or ordered in this encounter       Imaging & Referrals:  None         Janet Walls MD

## 2021-11-10 NOTE — PROGRESS NOTES
Chart reviewed. Patient had office visit today with PCP. Reports BP, blood glucose and weight have all been stable. No concerns noted at this time. NCM to follow-up.

## 2021-11-18 ENCOUNTER — TELEPHONE (OUTPATIENT)
Dept: INTERNAL MEDICINE CLINIC | Facility: CLINIC | Age: 86
End: 2021-11-18

## 2021-11-18 NOTE — TELEPHONE ENCOUNTER
[facility] Clnical [facility]/[phone] states that the patient is requesting a refill on his Stimulant Laxative Plus  Medication. [facility]acy: [facility] [address] [facility] (listed) pt is out of medication.  No able to pick [facility] under pharmacy:

## 2021-11-18 NOTE — TELEPHONE ENCOUNTER
Spoke with patient, states that he went to his pharmacy today to  his medications and requested for stool ,softener and was told that it is not due until 11/26/21 but when he got home and prn the bag,he found STIMULANT LAXATIVE 8.6-50 mg tablet , st

## 2021-12-15 ENCOUNTER — PATIENT OUTREACH (OUTPATIENT)
Dept: CASE MANAGEMENT | Age: 86
End: 2021-12-15

## 2021-12-15 NOTE — PROGRESS NOTES
Chart reviewed. Patient has been in contact with PCP office regarding medication refill request.  Patient also had a recent eye exam (scanned into chart). No needs for NCM to address at this time. Will follow-up.

## 2021-12-16 ENCOUNTER — TELEPHONE (OUTPATIENT)
Dept: INTERNAL MEDICINE CLINIC | Facility: CLINIC | Age: 86
End: 2021-12-16

## 2022-01-10 RX ORDER — ALLOPURINOL 300 MG/1
300 TABLET ORAL DAILY
Qty: 90 TABLET | Refills: 1 | Status: SHIPPED | OUTPATIENT
Start: 2022-01-10

## 2022-01-10 NOTE — TELEPHONE ENCOUNTER
Patient called to ask when his  allopurinol 300 MG Oral Tab will be approved. Please call patient when it is approved.  Out of medication

## 2022-01-10 NOTE — TELEPHONE ENCOUNTER
•  allopurinol 300 MG Oral Tab, Take 1 tablet (300 mg total) by mouth daily. , Disp: 90 tablet, Rfl: 1  •

## 2022-01-24 ENCOUNTER — OFFICE VISIT (OUTPATIENT)
Dept: INTERNAL MEDICINE CLINIC | Facility: CLINIC | Age: 87
End: 2022-01-24
Payer: MEDICARE

## 2022-01-24 VITALS
DIASTOLIC BLOOD PRESSURE: 70 MMHG | HEIGHT: 60 IN | HEART RATE: 57 BPM | RESPIRATION RATE: 16 BRPM | SYSTOLIC BLOOD PRESSURE: 158 MMHG | BODY MASS INDEX: 23.75 KG/M2 | WEIGHT: 121 LBS

## 2022-01-24 DIAGNOSIS — Z00.00 ENCOUNTER FOR ANNUAL HEALTH EXAMINATION: Primary | ICD-10-CM

## 2022-01-24 DIAGNOSIS — G11.9 PRIMARY CEREBELLAR DEGENERATION (HCC): ICD-10-CM

## 2022-01-24 DIAGNOSIS — K21.9 GASTROESOPHAGEAL REFLUX DISEASE, UNSPECIFIED WHETHER ESOPHAGITIS PRESENT: ICD-10-CM

## 2022-01-24 DIAGNOSIS — I65.23 BILATERAL CAROTID ARTERY STENOSIS: ICD-10-CM

## 2022-01-24 DIAGNOSIS — E11.9 TYPE 2 DIABETES MELLITUS WITHOUT COMPLICATION, WITHOUT LONG-TERM CURRENT USE OF INSULIN (HCC): ICD-10-CM

## 2022-01-24 DIAGNOSIS — D50.8 OTHER IRON DEFICIENCY ANEMIA: ICD-10-CM

## 2022-01-24 DIAGNOSIS — I45.10 RBBB (RIGHT BUNDLE BRANCH BLOCK): ICD-10-CM

## 2022-01-24 DIAGNOSIS — I51.89 DIASTOLIC DYSFUNCTION: ICD-10-CM

## 2022-01-24 DIAGNOSIS — I10 ESSENTIAL HYPERTENSION: ICD-10-CM

## 2022-01-24 DIAGNOSIS — L30.9 DERMATITIS: ICD-10-CM

## 2022-01-24 DIAGNOSIS — I25.119 ATHEROSCLEROSIS OF NATIVE CORONARY ARTERY OF NATIVE HEART WITH ANGINA PECTORIS (HCC): ICD-10-CM

## 2022-01-24 PROBLEM — R73.9 HYPERGLYCEMIA: Status: RESOLVED | Noted: 2021-09-26 | Resolved: 2022-01-24

## 2022-01-24 PROCEDURE — 3008F BODY MASS INDEX DOCD: CPT | Performed by: INTERNAL MEDICINE

## 2022-01-24 PROCEDURE — 96160 PT-FOCUSED HLTH RISK ASSMT: CPT | Performed by: INTERNAL MEDICINE

## 2022-01-24 PROCEDURE — G0439 PPPS, SUBSEQ VISIT: HCPCS | Performed by: INTERNAL MEDICINE

## 2022-01-24 PROCEDURE — 3077F SYST BP >= 140 MM HG: CPT | Performed by: INTERNAL MEDICINE

## 2022-01-24 PROCEDURE — 99397 PER PM REEVAL EST PAT 65+ YR: CPT | Performed by: INTERNAL MEDICINE

## 2022-01-24 PROCEDURE — 3078F DIAST BP <80 MM HG: CPT | Performed by: INTERNAL MEDICINE

## 2022-01-24 RX ORDER — LOSARTAN POTASSIUM 100 MG/1
100 TABLET ORAL DAILY
Qty: 90 TABLET | Refills: 1 | Status: SHIPPED | OUTPATIENT
Start: 2022-01-24

## 2022-01-24 RX ORDER — TERAZOSIN 2 MG/1
2 CAPSULE ORAL NIGHTLY
Qty: 90 CAPSULE | Refills: 1 | Status: SHIPPED | OUTPATIENT
Start: 2022-01-24

## 2022-02-07 ENCOUNTER — LAB ENCOUNTER (OUTPATIENT)
Dept: LAB | Facility: HOSPITAL | Age: 87
End: 2022-02-07
Attending: INTERNAL MEDICINE
Payer: MEDICARE

## 2022-02-07 DIAGNOSIS — I50.31 ACUTE DIASTOLIC HEART FAILURE (HCC): Primary | ICD-10-CM

## 2022-02-07 DIAGNOSIS — E11.9 TYPE 2 DIABETES MELLITUS WITHOUT COMPLICATION, WITHOUT LONG-TERM CURRENT USE OF INSULIN (HCC): ICD-10-CM

## 2022-02-07 DIAGNOSIS — E78.49 FAMILIAL COMBINED HYPERLIPIDEMIA: ICD-10-CM

## 2022-02-07 DIAGNOSIS — I25.10 CORONARY ATHEROSCLEROSIS OF NATIVE CORONARY ARTERY: ICD-10-CM

## 2022-02-07 DIAGNOSIS — D64.9 ANEMIA, UNSPECIFIED: ICD-10-CM

## 2022-02-07 DIAGNOSIS — D50.8 OTHER IRON DEFICIENCY ANEMIA: ICD-10-CM

## 2022-02-07 LAB
ALBUMIN SERPL-MCNC: 3.8 G/DL (ref 3.4–5)
ALBUMIN/GLOB SERPL: 1.3 {RATIO} (ref 1–2)
ALP LIVER SERPL-CCNC: 91 U/L
ALT SERPL-CCNC: 20 U/L
ANION GAP SERPL CALC-SCNC: 8 MMOL/L (ref 0–18)
AST SERPL-CCNC: 6 U/L (ref 15–37)
BASOPHILS # BLD AUTO: 0.05 X10(3) UL (ref 0–0.2)
BASOPHILS NFR BLD AUTO: 0.7 %
BILIRUB SERPL-MCNC: 0.5 MG/DL (ref 0.1–2)
BUN BLD-MCNC: 24 MG/DL (ref 7–18)
BUN/CREAT SERPL: 24.5 (ref 10–20)
CALCIUM BLD-MCNC: 9.3 MG/DL (ref 8.5–10.1)
CHLORIDE SERPL-SCNC: 110 MMOL/L (ref 98–112)
CHOLEST SERPL-MCNC: 89 MG/DL (ref ?–200)
CO2 SERPL-SCNC: 24 MMOL/L (ref 21–32)
CREAT BLD-MCNC: 0.98 MG/DL
DEPRECATED RDW RBC AUTO: 48.7 FL (ref 35.1–46.3)
EOSINOPHIL # BLD AUTO: 0.38 X10(3) UL (ref 0–0.7)
EOSINOPHIL NFR BLD AUTO: 5 %
ERYTHROCYTE [DISTWIDTH] IN BLOOD BY AUTOMATED COUNT: 15.7 % (ref 11–15)
EST. AVERAGE GLUCOSE BLD GHB EST-MCNC: 137 MG/DL (ref 68–126)
FASTING PATIENT LIPID ANSWER: YES
FASTING STATUS PATIENT QL REPORTED: YES
GLOBULIN PLAS-MCNC: 2.9 G/DL (ref 2.8–4.4)
GLUCOSE BLD-MCNC: 115 MG/DL (ref 70–99)
HBA1C MFR BLD: 6.4 % (ref ?–5.7)
HCT VFR BLD AUTO: 33.5 %
HDLC SERPL-MCNC: 34 MG/DL (ref 40–59)
HGB BLD-MCNC: 10.3 G/DL
IMM GRANULOCYTES # BLD AUTO: 0.02 X10(3) UL (ref 0–1)
IMM GRANULOCYTES NFR BLD: 0.3 %
LDLC SERPL CALC-MCNC: 41 MG/DL (ref ?–100)
LYMPHOCYTES # BLD AUTO: 1.06 X10(3) UL (ref 1–4)
LYMPHOCYTES NFR BLD AUTO: 13.8 %
MCH RBC QN AUTO: 26.3 PG (ref 26–34)
MCHC RBC AUTO-ENTMCNC: 30.7 G/DL (ref 31–37)
MCV RBC AUTO: 85.5 FL
MONOCYTES # BLD AUTO: 0.54 X10(3) UL (ref 0.1–1)
MONOCYTES NFR BLD AUTO: 7 %
NEUTROPHILS # BLD AUTO: 5.62 X10 (3) UL (ref 1.5–7.7)
NEUTROPHILS # BLD AUTO: 5.62 X10(3) UL (ref 1.5–7.7)
NEUTROPHILS NFR BLD AUTO: 73.2 %
NONHDLC SERPL-MCNC: 55 MG/DL (ref ?–130)
OSMOLALITY SERPL CALC.SUM OF ELEC: 299 MOSM/KG (ref 275–295)
PLATELET # BLD AUTO: 217 10(3)UL (ref 150–450)
POTASSIUM SERPL-SCNC: 4.4 MMOL/L (ref 3.5–5.1)
PROT SERPL-MCNC: 6.7 G/DL (ref 6.4–8.2)
RBC # BLD AUTO: 3.92 X10(6)UL
SODIUM SERPL-SCNC: 142 MMOL/L (ref 136–145)
TRIGL SERPL-MCNC: 62 MG/DL (ref 30–149)
VLDLC SERPL CALC-MCNC: 9 MG/DL (ref 0–30)
WBC # BLD AUTO: 7.7 X10(3) UL (ref 4–11)

## 2022-02-07 PROCEDURE — 85025 COMPLETE CBC W/AUTO DIFF WBC: CPT

## 2022-02-07 PROCEDURE — 80053 COMPREHEN METABOLIC PANEL: CPT

## 2022-02-07 PROCEDURE — 36415 COLL VENOUS BLD VENIPUNCTURE: CPT

## 2022-02-07 PROCEDURE — 80061 LIPID PANEL: CPT

## 2022-02-07 PROCEDURE — 83036 HEMOGLOBIN GLYCOSYLATED A1C: CPT

## 2022-02-08 PROBLEM — L89.90 PRESSURE ULCER: Status: ACTIVE | Noted: 2022-02-08

## 2022-02-08 PROBLEM — Z99.3 DEPENDENCE ON WHEELCHAIR: Status: ACTIVE | Noted: 2022-02-08
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
## 2022-02-17 RX ORDER — GLUCOSAM/CHON-MSM1/C/MANG/BOSW 500-416.6
TABLET ORAL
Qty: 200 EACH | Refills: 3 | Status: SHIPPED | OUTPATIENT
Start: 2022-02-17

## 2022-02-17 NOTE — TELEPHONE ENCOUNTER
Patient is calling to request the following medication refills:    Glucose Blood Accu-Cjheck Smartview  TruePlus Lancets

## 2022-02-17 NOTE — TELEPHONE ENCOUNTER
Refill passed per [facility] protocol.    Requested Prescriptions   Pending Prescriptions Disp Refills    TRUEplus Lancets 33G Does not apply Misc 200 each 1     Sig: Use to check glucose twice a day        Diabetic Supplies Protocol Passed - 2/17/2022 10:18 AM        Passed - Appointment in past 12 or next 3 months             Recent Outpatient Visits              3 weeks ago Encounter for annual health examination    [facility], [address], Bridget Aparicio MD    Office Visit    3 months ago Essential hypertension    [facility], [address], Tara Polanco MD    Office Visit    4 months ago Acute congestive heart failure, unspecified heart failure type [facility])    [facility], [address], Mak Villa APRN    Office Visit    6 months ago Type 2 diabetes mellitus without complication, without long-term current use of insulin [facility])    [facility], [address], Tara Polanco MD    Office Visit    9 months ago Type 2 diabetes mellitus without complication, without long-term current use of insulin [facility])    [facility], [address], Bridget Aparicio MD    Office Visit           Future Appointments         Provider Department Appt Notes    In 2 months Car Blanton MD [facility], [address] [place] [address] 3 mo f/u

## 2022-02-21 ENCOUNTER — TELEPHONE (OUTPATIENT)
Dept: CASE MANAGEMENT | Age: 87
End: 2022-02-21

## 2022-02-21 NOTE — TELEPHONE ENCOUNTER
Dr. Ezekiel Srivastava,    The patient called requesting referral to Dr. Ariel Diop. Pended referral please review diagnosis and sign off if you agree. Thank you.   Jasmeet Patel

## 2022-02-22 NOTE — TELEPHONE ENCOUNTER
Dr. Randy Collet,    Please sign off if you agree with plan of care. Patient has appointment 2/23/22. Thank you.   Referral Department

## 2022-02-23 RX ORDER — GLIPIZIDE AND METFORMIN HCL 2.5; 5 MG/1; MG/1
1 TABLET, FILM COATED ORAL 2 TIMES DAILY WITH MEALS
Qty: 180 TABLET | Refills: 1 | Status: SHIPPED | OUTPATIENT
Start: 2022-02-23

## 2022-02-23 RX ORDER — CLOPIDOGREL BISULFATE 75 MG/1
75 TABLET ORAL DAILY
Qty: 90 TABLET | Refills: 1 | Status: SHIPPED | OUTPATIENT
Start: 2022-02-23

## 2022-02-23 NOTE — TELEPHONE ENCOUNTER
Please review. Protocol failed or has no protocol. Glipizide/Metformin in med list as historical.    Requested Prescriptions   Pending Prescriptions Disp Refills    clopidogrel 75 MG Oral Tab 90 tablet 1     Sig: Take 1 tablet (75 mg total) by mouth daily. There is no refill protocol information for this order        glipiZIDE-metFORMIN HCl 2.5-500 MG Oral Tab 180 tablet 1     Sig: Take 1 tablet by mouth 2 (two) times daily with meals.         Diabetes Medication Protocol Passed - 2/23/2022  2:36 PM        Passed - Last A1C < 7.5 and within past 6 months     Lab Results   Component Value Date    A1C 6.4 (H) 02/07/2022               Passed - Appointment in past 6 or next 3 months        Passed - GFR Non- > 50     Lab Results   Component Value Date    GFRNAA 70 02/07/2022                 Passed - GFR in the past 12 months              Recent Outpatient Visits              1 month ago Encounter for annual health examination    [facility] Tameka Crawford MD    Office Visit    3 months ago Essential hypertension    [facility], [address], Kalpesh Polanco MD    Office Visit    4 months ago Acute congestive heart failure, unspecified heart failure type [facility])    [facility], [address], Felisa Villa APRN    Office Visit    6 months ago Type 2 diabetes mellitus without complication, without long-term current use of insulin [facility])    [facility], [address], Kalpesh Polanco MD    Office Visit    9 months ago Type 2 diabetes mellitus without complication, without long-term current use of insulin [facility])    [facility], [address]ameka MD    Office Visit            Future Appointments         Provider Department Appt Notes    In 2 months [facility]ia Mann MD [facility], [address] [facility] 3 mo f/u

## 2022-02-23 NOTE — TELEPHONE ENCOUNTER
Pt calling to f/up on referral request to see eye dr mota 2/23/2022 at 1:00pm to see Dr Dariel Gates.

## 2022-03-30 RX ORDER — METOPROLOL SUCCINATE 100 MG/1
150 TABLET, EXTENDED RELEASE ORAL DAILY
Qty: 135 TABLET | Refills: 1 | Status: SHIPPED | OUTPATIENT
Start: 2022-03-30

## 2022-03-30 NOTE — TELEPHONE ENCOUNTER
Refill passed per [facility] [place] Jerzy Quiros protocol. Requested Prescriptions   Pending Prescriptions Disp Refills    metoprolol succinate  MG Oral Tablet 24 Hr 90 tablet 1     Sig: Take 1.5 tablets (150 mg total) by mouth daily.         Hypertensive Medications Protocol Passed - 3/30/2022 11:08 AM        Passed - CMP or BMP in past 12 months        Passed - Appointment in past 6 or next 3 months        Passed - GFR Non- > 50     Lab Results   Component Value Date    GFRNAA 70 02/07/2022                        Recent Outpatient Visits              2 months ago Encounter for annual health examination    [facility] Jackie Junior Lavinia Pickles, MD    Office Visit    4 months ago Essential hypertension    [facility] Jackie Junior [place], Dairl Coho, MD    Office Visit    5 months ago Acute congestive heart failure, unspecified heart failure type [facility])    [facility] Jackie Junior [place], Denyce Clayman, APRN    Office Visit    7 months ago Type 2 diabetes mellitus without complication, without long-term current use of insulin [facility])    [facility] Jackie Junior [place], Dairl Coho, MD    Office Visit    10 months ago Type 2 diabetes mellitus without complication, without long-term current use of insulin [facility])    [facility] [place], Dairl Coho, MD    Office Visit            Future Appointments         Provider Department Appt Notes    In 4 [place] [address] [place] [address] [place], [address] [facility] 3 mo f/u

## 2022-04-25 ENCOUNTER — TELEPHONE (OUTPATIENT)
Dept: INTERNAL MEDICINE CLINIC | Facility: CLINIC | Age: 87
End: 2022-04-25

## 2022-04-25 NOTE — TELEPHONE ENCOUNTER
Patient calling to reschedule his appointment to May 9th, same day as his wife.  There is no availability, please advise

## 2022-04-27 NOTE — TELEPHONE ENCOUNTER
On Monday, May 9, the patient can be seen at 140. There is an 1140 slot that is open right now. Please block that slot out. Tell the patient to show up at 140. Then he and his wife are seen almost together.

## 2022-05-04 RX ORDER — DOCUSATE SODIUM 50 MG AND SENNOSIDES 8.6 MG 8.6; 5 MG/1; MG/1
1 TABLET, FILM COATED ORAL DAILY
Qty: 90 TABLET | Refills: 1 | Status: SHIPPED | OUTPATIENT
Start: 2022-05-04 | End: 2022-11-09

## 2022-05-04 NOTE — TELEPHONE ENCOUNTER
Refill passed per [facility] protocol. Requested Prescriptions   Pending Prescriptions Disp Refills    STIMULANT LAXATIVE 8.6-50 MG Oral Tab 90 tablet 1     Sig: Take 1 tablet by mouth daily.         Gastrointestional Medication Protocol Passed - 5/4/2022 12:32 PM        Passed - Appointment in past 12 or next 3 months               Future Appointments         Provider Department Appt Notes    In 5 days Skip Muro MD [facility], [address] 3 mos f/u see comm \";policy informed\"            Recent Outpatient Visits              3 months ago Encounter for annual health examination    Rudy Mejia MD    Office Visit    5 months ago Essential hypertension    [facility], [address], Nevin Polanco MD    Office Visit    7 months ago Acute congestive heart failure, unspecified heart failure type [facility])    [facility], [address], Felisa Villa APRN    Office Visit    8 months ago Type 2 diabetes mellitus without complication, without long-term current use of insulin [facility])    [facility], [address], Nevin Polanco MD    Office Visit    11 months ago Type 2 diabetes mellitus without complication, without long-term current use of insulin [facility])    [facility], [address], Nevin Polanco MD    Office Visit

## 2022-05-04 NOTE — TELEPHONE ENCOUNTER
Patient is requesting a refill, send to [facility] on file.     STIMULANT LAXATIVE 8.6-50 MG Oral Tab

## 2022-05-09 ENCOUNTER — OFFICE VISIT (OUTPATIENT)
Dept: INTERNAL MEDICINE CLINIC | Facility: CLINIC | Age: 87
End: 2022-05-09
Payer: MEDICARE

## 2022-05-09 VITALS
BODY MASS INDEX: 23.36 KG/M2 | TEMPERATURE: 98 F | RESPIRATION RATE: 18 BRPM | HEART RATE: 16 BPM | WEIGHT: 119 LBS | DIASTOLIC BLOOD PRESSURE: 62 MMHG | SYSTOLIC BLOOD PRESSURE: 148 MMHG | HEIGHT: 60 IN

## 2022-05-09 DIAGNOSIS — I10 ESSENTIAL HYPERTENSION: ICD-10-CM

## 2022-05-09 DIAGNOSIS — K21.9 GASTROESOPHAGEAL REFLUX DISEASE, UNSPECIFIED WHETHER ESOPHAGITIS PRESENT: ICD-10-CM

## 2022-05-09 DIAGNOSIS — I25.119 ATHEROSCLEROSIS OF NATIVE CORONARY ARTERY OF NATIVE HEART WITH ANGINA PECTORIS (HCC): ICD-10-CM

## 2022-05-09 DIAGNOSIS — E11.9 TYPE 2 DIABETES MELLITUS WITHOUT COMPLICATION, WITHOUT LONG-TERM CURRENT USE OF INSULIN (HCC): Primary | ICD-10-CM

## 2022-05-09 LAB
CARTRIDGE LOT#: 889 NUMERIC
HEMOGLOBIN A1C: 6 % (ref 4.3–5.6)

## 2022-05-09 PROCEDURE — 99214 OFFICE O/P EST MOD 30 MIN: CPT | Performed by: INTERNAL MEDICINE

## 2022-05-09 PROCEDURE — 83036 HEMOGLOBIN GLYCOSYLATED A1C: CPT | Performed by: INTERNAL MEDICINE

## 2022-05-09 PROCEDURE — 3078F DIAST BP <80 MM HG: CPT | Performed by: INTERNAL MEDICINE

## 2022-05-09 PROCEDURE — 3077F SYST BP >= 140 MM HG: CPT | Performed by: INTERNAL MEDICINE

## 2022-05-09 PROCEDURE — 3008F BODY MASS INDEX DOCD: CPT | Performed by: INTERNAL MEDICINE

## 2022-05-09 RX ORDER — AMLODIPINE BESYLATE 2.5 MG/1
2.5 TABLET ORAL DAILY
Qty: 90 TABLET | Refills: 1 | Status: SHIPPED | OUTPATIENT
Start: 2022-05-09

## 2022-05-09 RX ORDER — FUROSEMIDE 20 MG/1
20 TABLET ORAL DAILY
Qty: 90 TABLET | Refills: 3 | Status: SHIPPED | OUTPATIENT
Start: 2022-05-09

## 2022-05-09 RX ORDER — PREDNISOLONE ACETATE 10 MG/ML
SUSPENSION/ DROPS OPHTHALMIC
COMMUNITY
Start: 2022-02-09 | End: 2022-05-09 | Stop reason: ALTCHOICE

## 2022-05-09 RX ORDER — ERYTHROMYCIN 5 MG/G
1 OINTMENT OPHTHALMIC NIGHTLY
COMMUNITY
Start: 2022-02-17 | End: 2022-05-09 | Stop reason: ALTCHOICE

## 2022-05-10 ENCOUNTER — TELEPHONE (OUTPATIENT)
Dept: INTERNAL MEDICINE CLINIC | Facility: CLINIC | Age: 87
End: 2022-05-10

## 2022-05-10 NOTE — TELEPHONE ENCOUNTER
Per patient, pharmacy did not receive  The prescription for STIMULANT LAXATIVE 8.6-50 MG Oral Tab.  Please advise

## 2022-05-16 RX ORDER — BLOOD SUGAR DIAGNOSTIC
STRIP MISCELLANEOUS
Qty: 200 STRIP | Refills: 1 | Status: SHIPPED | OUTPATIENT
Start: 2022-05-16 | End: 2022-07-23

## 2022-05-16 NOTE — TELEPHONE ENCOUNTER
Refill passed per [place] [employer] [facility] protocol.     Requested Prescriptions   Pending Prescriptions Disp Refills    ACCU-CHEK SMARTVIEW In Vitro Strip Frantz Med Name: Jazlyn Colet   Strip] 200 strip 1     Sig: Use to check glucose twice a day        Diabetic Supplies Protocol Passed - 5/16/2022  2:37 AM        Passed - Appointment in past 15 or next 3 months              Future Appointments         Provider Department Appt Notes    In 3 months Jeannette Davis MD [place] [employer] [employer] [facility], [address] 3 mo f/u            Recent Outpatient Visits              1 week ago Type 2 diabetes mellitus without complication, without long-term current use of insulin [facility])    [facility], [address], Edgar Mejia MD    Office Visit    3 months ago Encounter for annual health examination    [address] [place] [address], Edgar Mejia MD    Office Visit    6 months ago Essential hypertension    [facility], [address], Meka Polanco MD    Office Visit    7 months ago Acute congestive heart failure, unspecified heart failure type [facility])    [facility], [address], Felisa Villa APRN    Office Visit    9 months ago Type 2 diabetes mellitus without complication, without long-term current use of insulin [facility])    [facility], [address], Meka Polanco MD    Office Visit

## 2022-06-06 ENCOUNTER — TELEPHONE (OUTPATIENT)
Dept: INTERNAL MEDICINE CLINIC | Facility: CLINIC | Age: 87
End: 2022-06-06

## 2022-06-06 DIAGNOSIS — H40.9 GLAUCOMA, UNSPECIFIED GLAUCOMA TYPE, UNSPECIFIED LATERALITY: Primary | ICD-10-CM

## 2022-06-06 NOTE — TELEPHONE ENCOUNTER
Received fax from [facility] requested referral for patient for his follow-up appointment. Referral pending in EMR JSK please advise.

## 2022-07-13 RX ORDER — TERAZOSIN 2 MG/1
2 CAPSULE ORAL NIGHTLY
Qty: 90 CAPSULE | Refills: 1 | Status: SHIPPED | OUTPATIENT
Start: 2022-07-13

## 2022-07-13 RX ORDER — ALLOPURINOL 300 MG/1
300 TABLET ORAL DAILY
Qty: 90 TABLET | Refills: 1 | Status: SHIPPED | OUTPATIENT
Start: 2022-07-13

## 2022-07-13 RX ORDER — LOSARTAN POTASSIUM 100 MG/1
100 TABLET ORAL DAILY
Qty: 90 TABLET | Refills: 1 | Status: SHIPPED | OUTPATIENT
Start: 2022-07-13

## 2022-07-13 RX ORDER — MELATONIN
325
Qty: 90 TABLET | Refills: 1 | Status: SHIPPED | OUTPATIENT
Start: 2022-07-13

## 2022-07-13 RX ORDER — CLOPIDOGREL BISULFATE 75 MG/1
75 TABLET ORAL DAILY
Qty: 90 TABLET | Refills: 1 | Status: SHIPPED | OUTPATIENT
Start: 2022-07-13

## 2022-07-13 NOTE — TELEPHONE ENCOUNTER
Patient calling requesting refill for following medications:  Ferrus Sulfate, losartan, allopurinol, terazosin, clopidogrel

## 2022-07-23 RX ORDER — BLOOD SUGAR DIAGNOSTIC
STRIP MISCELLANEOUS
Qty: 200 STRIP | Refills: 1 | Status: SHIPPED | OUTPATIENT
Start: 2022-07-23

## 2022-08-15 ENCOUNTER — OFFICE VISIT (OUTPATIENT)
Dept: INTERNAL MEDICINE CLINIC | Facility: CLINIC | Age: 87
End: 2022-08-15
Payer: MEDICARE

## 2022-08-15 VITALS
RESPIRATION RATE: 18 BRPM | TEMPERATURE: 98 F | WEIGHT: 118 LBS | BODY MASS INDEX: 23.16 KG/M2 | SYSTOLIC BLOOD PRESSURE: 134 MMHG | DIASTOLIC BLOOD PRESSURE: 70 MMHG | HEART RATE: 96 BPM | HEIGHT: 60 IN

## 2022-08-15 DIAGNOSIS — I10 ESSENTIAL HYPERTENSION: ICD-10-CM

## 2022-08-15 DIAGNOSIS — E11.9 TYPE 2 DIABETES MELLITUS WITHOUT COMPLICATION, WITHOUT LONG-TERM CURRENT USE OF INSULIN (HCC): Primary | ICD-10-CM

## 2022-08-15 DIAGNOSIS — I25.119 ATHEROSCLEROSIS OF NATIVE CORONARY ARTERY OF NATIVE HEART WITH ANGINA PECTORIS (HCC): ICD-10-CM

## 2022-08-15 DIAGNOSIS — K21.9 GASTROESOPHAGEAL REFLUX DISEASE, UNSPECIFIED WHETHER ESOPHAGITIS PRESENT: ICD-10-CM

## 2022-08-15 DIAGNOSIS — I51.89 DIASTOLIC DYSFUNCTION: ICD-10-CM

## 2022-08-15 LAB
CARTRIDGE LOT#: 981 NUMERIC
HEMOGLOBIN A1C: 6.2 % (ref 4.3–5.6)

## 2022-08-15 PROCEDURE — 3078F DIAST BP <80 MM HG: CPT | Performed by: INTERNAL MEDICINE

## 2022-08-15 PROCEDURE — 99214 OFFICE O/P EST MOD 30 MIN: CPT | Performed by: INTERNAL MEDICINE

## 2022-08-15 PROCEDURE — 3008F BODY MASS INDEX DOCD: CPT | Performed by: INTERNAL MEDICINE

## 2022-08-15 PROCEDURE — 1126F AMNT PAIN NOTED NONE PRSNT: CPT | Performed by: INTERNAL MEDICINE

## 2022-08-15 PROCEDURE — 83036 HEMOGLOBIN GLYCOSYLATED A1C: CPT | Performed by: INTERNAL MEDICINE

## 2022-08-15 PROCEDURE — 3075F SYST BP GE 130 - 139MM HG: CPT | Performed by: INTERNAL MEDICINE

## 2022-08-15 RX ORDER — GLIPIZIDE AND METFORMIN HCL 2.5; 5 MG/1; MG/1
1 TABLET, FILM COATED ORAL 2 TIMES DAILY WITH MEALS
Qty: 180 TABLET | Refills: 1 | Status: CANCELLED
Start: 2022-08-15

## 2022-08-15 RX ORDER — GLIPIZIDE AND METFORMIN HCL 2.5; 5 MG/1; MG/1
1 TABLET, FILM COATED ORAL 2 TIMES DAILY WITH MEALS
Qty: 180 TABLET | Refills: 1 | Status: SHIPPED | OUTPATIENT
Start: 2022-08-15

## 2022-08-31 RX ORDER — CLOPIDOGREL BISULFATE 75 MG/1
75 TABLET ORAL DAILY
Qty: 90 TABLET | Refills: 1 | Status: SHIPPED | OUTPATIENT
Start: 2022-08-31

## 2022-10-05 RX ORDER — METOPROLOL SUCCINATE 100 MG/1
150 TABLET, EXTENDED RELEASE ORAL DAILY
Qty: 135 TABLET | Refills: 1 | Status: SHIPPED | OUTPATIENT
Start: 2022-10-05

## 2022-10-05 NOTE — TELEPHONE ENCOUNTER
Please review refill protocol failed/ no protocol  Requested Prescriptions   Pending Prescriptions Disp Refills    METOPROLOL SUCCINATE  MG Oral Tablet 24 Hr [Pharmacy Med Name: METOPROLOL ER SUCCINATE 100MG TABS] 135 tablet 1     Sig: TAKE 1 AND 1/2 TABLETS(150 MG) BY MOUTH DAILY        Hypertensive Medications Protocol Failed - 10/5/2022 11:20 AM        Failed - CMP or BMP in past 6 months     No results found for this or any previous visit (from the past 4392 hour(s)).               Passed - In person appointment in the past 12 or next 3 months       Recent Outpatient Visits              1 month ago Type 2 diabetes mellitus without complication, without long-term current use of insulin [facility])    [address] [place] [address], Unknown MD Rolo    Office Visit    4 months ago Type 2 diabetes mellitus without complication, without long-term current use of insulin [facility])    [employer], [address], Mala Polanco MD    Office Visit    8 months ago Encounter for [employer], [address], Unknown MD Rolo    Office Visit    10 months ago Essential hypertension    [employer], [address], Mala Polanco MD    Office Visit    1 year ago Acute congestive heart failure, unspecified heart failure type [facility])    [employer], [address], Jayce Villa APRN    Office Visit     Future Appointments         Provider Department Appt Notes    In 1 month Lincoln Wong MD [facility] 3 mo f/u               Passed - Last BP reading less than 140/90     BP Readings from Last 1 Encounters:  08/15/22 : 134/70                Passed - In person appointment or virtual visit in the past 6 months       Recent Outpatient Visits              1 month ago Type 2 diabetes mellitus without complication, without long-term current use of insulin [facility])    [employer], [address], Mala Polanco MD Office Visit    4 months ago Type 2 diabetes mellitus without complication, without long-term current use of insulin [facility])    [facility], [address], Stephne Polanco MD    Office Visit    8 months ago Encounter for [employer], [address], Stephen Polanco MD    Office Visit    10 months ago Essential hypertension    [facility], [address], Stephen Polanco MD    Office Visit    1 year ago Acute congestive heart failure, unspecified heart failure type [facility])    [facility], [address], Nissa Villa APRN    Office Visit     Future Appointments         Provider Department Appt Notes    In 1 month Jaylin Lowery MD [address] 3 mo f/u               [facility] or GFRNAA > 50     GFR Evaluation  GFRNAA: 71 , resulted on 2/7/2022

## 2022-10-07 ENCOUNTER — TELEPHONE (OUTPATIENT)
Dept: CASE MANAGEMENT | Age: 87
End: 2022-10-07

## 2022-10-07 DIAGNOSIS — I25.119 ATHEROSCLEROSIS OF NATIVE CORONARY ARTERY OF NATIVE HEART WITH ANGINA PECTORIS (HCC): Primary | ICD-10-CM

## 2022-10-07 NOTE — TELEPHONE ENCOUNTER
Dr. Hanna Mireles,    The patient is requesting a referral to Dr. Nano Jackson for follow up visits. Pended referral please review diagnosis and sign off if you agree. Thank you.   Jasmeet Patel

## 2022-10-10 ENCOUNTER — HOSPITAL ENCOUNTER (EMERGENCY)
Facility: HOSPITAL | Age: 87
Discharge: HOME | End: 2022-10-10
Attending: EMERGENCY MEDICINE
Payer: MEDICARE

## 2022-10-10 VITALS
SYSTOLIC BLOOD PRESSURE: 148 MMHG | TEMPERATURE: 98 F | HEART RATE: 62 BPM | DIASTOLIC BLOOD PRESSURE: 50 MMHG | BODY MASS INDEX: 21 KG/M2 | RESPIRATION RATE: 16 BRPM | WEIGHT: 110 LBS | OXYGEN SATURATION: 99 %

## 2022-10-10 DIAGNOSIS — K06.8 BLEEDING GUMS: Primary | ICD-10-CM

## 2022-10-10 LAB
ANION GAP SERPL CALC-SCNC: 7 MMOL/L (ref 0–18)
BASOPHILS # BLD AUTO: 0.08 X10(3) UL (ref 0–0.2)
BASOPHILS NFR BLD AUTO: 0.8 %
BUN BLD-MCNC: 24 MG/DL (ref 7–18)
BUN/CREAT SERPL: 26.4 (ref 10–20)
CALCIUM BLD-MCNC: 9.1 MG/DL (ref 8.5–10.1)
CHLORIDE SERPL-SCNC: 109 MMOL/L (ref 98–112)
CO2 SERPL-SCNC: 23 MMOL/L (ref 21–32)
CREAT BLD-MCNC: 0.91 MG/DL
DEPRECATED RDW RBC AUTO: 43.9 FL (ref 35.1–46.3)
EOSINOPHIL # BLD AUTO: 0.93 X10(3) UL (ref 0–0.7)
EOSINOPHIL NFR BLD AUTO: 9.6 %
ERYTHROCYTE [DISTWIDTH] IN BLOOD BY AUTOMATED COUNT: 15 % (ref 11–15)
GFR SERPLBLD BASED ON 1.73 SQ M-ARVRAT: 83 ML/MIN/1.73M2 (ref 60–?)
GLUCOSE BLD-MCNC: 160 MG/DL (ref 70–99)
HCT VFR BLD AUTO: 23.3 %
HGB BLD-MCNC: 7.2 G/DL
IMM GRANULOCYTES # BLD AUTO: 0.04 X10(3) UL (ref 0–1)
IMM GRANULOCYTES NFR BLD: 0.4 %
LYMPHOCYTES # BLD AUTO: 1.68 X10(3) UL (ref 1–4)
LYMPHOCYTES NFR BLD AUTO: 17.4 %
MCH RBC QN AUTO: 25.1 PG (ref 26–34)
MCHC RBC AUTO-ENTMCNC: 30.9 G/DL (ref 31–37)
MCV RBC AUTO: 81.2 FL
MONOCYTES # BLD AUTO: 0.73 X10(3) UL (ref 0.1–1)
MONOCYTES NFR BLD AUTO: 7.6 %
NEUTROPHILS # BLD AUTO: 6.2 X10 (3) UL (ref 1.5–7.7)
NEUTROPHILS # BLD AUTO: 6.2 X10(3) UL (ref 1.5–7.7)
NEUTROPHILS NFR BLD AUTO: 64.2 %
OSMOLALITY SERPL CALC.SUM OF ELEC: 295 MOSM/KG (ref 275–295)
PLATELET # BLD AUTO: 336 10(3)UL (ref 150–450)
POTASSIUM SERPL-SCNC: 4.6 MMOL/L (ref 3.5–5.1)
RBC # BLD AUTO: 2.87 X10(6)UL
SODIUM SERPL-SCNC: 139 MMOL/L (ref 136–145)
WBC # BLD AUTO: 9.7 X10(3) UL (ref 4–11)

## 2022-10-10 PROCEDURE — 36415 COLL VENOUS BLD VENIPUNCTURE: CPT

## 2022-10-10 PROCEDURE — 99283 EMERGENCY DEPT VISIT LOW MDM: CPT

## 2022-10-10 PROCEDURE — 85025 COMPLETE CBC W/AUTO DIFF WBC: CPT | Performed by: EMERGENCY MEDICINE

## 2022-10-10 PROCEDURE — 80048 BASIC METABOLIC PNL TOTAL CA: CPT | Performed by: EMERGENCY MEDICINE

## 2022-10-10 RX ORDER — TRANEXAMIC ACID 100 MG/ML
INJECTION, SOLUTION INTRAVENOUS
Status: COMPLETED
Start: 2022-10-10 | End: 2022-10-10

## 2022-10-10 NOTE — ED INITIAL ASSESSMENT (HPI)
Patient presents to the ED with continued oral bleeding after having 20 teeth extracted on September 30th. Pt stopped taking plavix two days ago. Patient alert and oriented. No respiratory distress noted.

## 2022-10-13 ENCOUNTER — TELEPHONE (OUTPATIENT)
Dept: INTERNAL MEDICINE CLINIC | Facility: CLINIC | Age: 87
End: 2022-10-13

## 2022-10-13 DIAGNOSIS — D64.9 ANEMIA, UNSPECIFIED TYPE: Primary | ICD-10-CM

## 2022-10-13 NOTE — TELEPHONE ENCOUNTER
Patient requesting blood test order states he was in the hospital and was told to repeat blood work. Please call patient when orders are in.

## 2022-10-13 NOTE — TELEPHONE ENCOUNTER
Please call patient. Lab in question was a CBC done in the emergency room on October 10. It showed anemia likely from bleeding from dental surgery. They requested repeat CBC in 1 week. I have generated an order for repeat CBC to be done next Monday.

## 2022-10-14 NOTE — TELEPHONE ENCOUNTER
Martina Michel has questions for the nurse about when to take the pt in to get labs done. Adarren 39 wants to know if pt is able to get lab tests done tomorrow on Sat instead of waiting until Monday?

## 2022-10-17 ENCOUNTER — TELEPHONE (OUTPATIENT)
Dept: INTERNAL MEDICINE CLINIC | Facility: CLINIC | Age: 87
End: 2022-10-17

## 2022-10-17 ENCOUNTER — LAB ENCOUNTER (OUTPATIENT)
Dept: LAB | Facility: HOSPITAL | Age: 87
End: 2022-10-17
Attending: INTERNAL MEDICINE
Payer: MEDICARE

## 2022-10-17 DIAGNOSIS — D64.9 ANEMIA, UNSPECIFIED TYPE: ICD-10-CM

## 2022-10-17 LAB
BASOPHILS # BLD AUTO: 0.08 X10(3) UL (ref 0–0.2)
BASOPHILS NFR BLD AUTO: 1.1 %
DEPRECATED RDW RBC AUTO: 47.3 FL (ref 35.1–46.3)
EOSINOPHIL # BLD AUTO: 0.59 X10(3) UL (ref 0–0.7)
EOSINOPHIL NFR BLD AUTO: 8 %
ERYTHROCYTE [DISTWIDTH] IN BLOOD BY AUTOMATED COUNT: 15.7 % (ref 11–15)
HCT VFR BLD AUTO: 23.7 %
HGB BLD-MCNC: 7.1 G/DL
IMM GRANULOCYTES # BLD AUTO: 0.02 X10(3) UL (ref 0–1)
IMM GRANULOCYTES NFR BLD: 0.3 %
LYMPHOCYTES # BLD AUTO: 1.35 X10(3) UL (ref 1–4)
LYMPHOCYTES NFR BLD AUTO: 18.4 %
MCH RBC QN AUTO: 24.9 PG (ref 26–34)
MCHC RBC AUTO-ENTMCNC: 30 G/DL (ref 31–37)
MCV RBC AUTO: 83.2 FL
MONOCYTES # BLD AUTO: 0.61 X10(3) UL (ref 0.1–1)
MONOCYTES NFR BLD AUTO: 8.3 %
NEUTROPHILS # BLD AUTO: 4.68 X10 (3) UL (ref 1.5–7.7)
NEUTROPHILS # BLD AUTO: 4.68 X10(3) UL (ref 1.5–7.7)
NEUTROPHILS NFR BLD AUTO: 63.9 %
PLATELET # BLD AUTO: 382 10(3)UL (ref 150–450)
RBC # BLD AUTO: 2.85 X10(6)UL
WBC # BLD AUTO: 7.3 X10(3) UL (ref 4–11)

## 2022-10-17 PROCEDURE — 85025 COMPLETE CBC W/AUTO DIFF WBC: CPT

## 2022-10-17 PROCEDURE — 36415 COLL VENOUS BLD VENIPUNCTURE: CPT

## 2022-10-17 NOTE — TELEPHONE ENCOUNTER
Pt states that he did his blood work today. Pt would like to schedule an appointment only with Dr. Brodie Davis to discuss test results. There are no available appointments this month with Dr. Brodie Davis. Would the doctor like the patient added to his schedule?if so which date and time.

## 2022-10-18 NOTE — TELEPHONE ENCOUNTER
I sent another message and earlier in the day to call the patient and get him in to see me this Friday at 1 PM.  Please make sure that is scheduled.

## 2022-10-21 ENCOUNTER — OFFICE VISIT (OUTPATIENT)
Dept: INTERNAL MEDICINE CLINIC | Facility: CLINIC | Age: 87
End: 2022-10-21
Payer: MEDICARE

## 2022-10-21 VITALS
SYSTOLIC BLOOD PRESSURE: 160 MMHG | HEIGHT: 60 IN | HEART RATE: 82 BPM | BODY MASS INDEX: 22.19 KG/M2 | TEMPERATURE: 98 F | RESPIRATION RATE: 18 BRPM | WEIGHT: 113 LBS | DIASTOLIC BLOOD PRESSURE: 64 MMHG

## 2022-10-21 DIAGNOSIS — D64.9 ANEMIA, UNSPECIFIED TYPE: Primary | ICD-10-CM

## 2022-10-21 PROCEDURE — 99214 OFFICE O/P EST MOD 30 MIN: CPT | Performed by: INTERNAL MEDICINE

## 2022-10-21 PROCEDURE — 3008F BODY MASS INDEX DOCD: CPT | Performed by: INTERNAL MEDICINE

## 2022-10-21 PROCEDURE — 3077F SYST BP >= 140 MM HG: CPT | Performed by: INTERNAL MEDICINE

## 2022-10-21 PROCEDURE — 3078F DIAST BP <80 MM HG: CPT | Performed by: INTERNAL MEDICINE

## 2022-10-21 PROCEDURE — 1126F AMNT PAIN NOTED NONE PRSNT: CPT | Performed by: INTERNAL MEDICINE

## 2022-10-21 NOTE — PATIENT INSTRUCTIONS
YOu have an anemia with a hemoglobin of 7.1. Normal is above 13. You do not need a transfusion now, but you are close. Increase the iron to one pill twice a day. Repeat the blood work lab test in one week. Call the office with any bleeding. If you have any chest pain or shortness of breath, you should go to the Emergency Room immediately.

## 2022-10-25 ENCOUNTER — TELEPHONE (OUTPATIENT)
Dept: INTERNAL MEDICINE CLINIC | Facility: CLINIC | Age: 87
End: 2022-10-25

## 2022-10-25 NOTE — TELEPHONE ENCOUNTER
Waiting on hold 35 minutes and the pharmacist hung up. Informed patient there are refills on file at the pharmacy but he will need to call.   Verbalized understanding

## 2022-10-27 ENCOUNTER — TELEPHONE (OUTPATIENT)
Dept: INTERNAL MEDICINE CLINIC | Facility: CLINIC | Age: 87
End: 2022-10-27

## 2022-10-27 NOTE — TELEPHONE ENCOUNTER
Keen[employer] Ophthalmologist calling to request an order for emergency ophthalmology visit for blurry vision.  She states that because we are [facility], they have received orders, not a referral.     Please call Dr's office to notify when this is approved at [phone]  Fax# [phone]

## 2022-10-27 NOTE — TELEPHONE ENCOUNTER
Referral team has already sent this to the office today.  It has been authorized   No further action

## 2022-10-29 ENCOUNTER — LAB ENCOUNTER (OUTPATIENT)
Dept: LAB | Facility: HOSPITAL | Age: 87
End: 2022-10-29
Attending: INTERNAL MEDICINE
Payer: MEDICARE

## 2022-10-29 DIAGNOSIS — D64.9 ANEMIA, UNSPECIFIED TYPE: ICD-10-CM

## 2022-10-29 LAB
BASOPHILS # BLD AUTO: 0.06 X10(3) UL (ref 0–0.2)
BASOPHILS NFR BLD AUTO: 0.7 %
DEPRECATED RDW RBC AUTO: 49.6 FL (ref 35.1–46.3)
EOSINOPHIL # BLD AUTO: 0.5 X10(3) UL (ref 0–0.7)
EOSINOPHIL NFR BLD AUTO: 5.8 %
ERYTHROCYTE [DISTWIDTH] IN BLOOD BY AUTOMATED COUNT: 16.7 % (ref 11–15)
HCT VFR BLD AUTO: 28.9 %
HGB BLD-MCNC: 8.6 G/DL
IMM GRANULOCYTES # BLD AUTO: 0.02 X10(3) UL (ref 0–1)
IMM GRANULOCYTES NFR BLD: 0.2 %
IRON SATN MFR SERPL: 8 %
IRON SERPL-MCNC: 27 UG/DL
LYMPHOCYTES # BLD AUTO: 1.45 X10(3) UL (ref 1–4)
LYMPHOCYTES NFR BLD AUTO: 16.8 %
MCH RBC QN AUTO: 24.6 PG (ref 26–34)
MCHC RBC AUTO-ENTMCNC: 29.8 G/DL (ref 31–37)
MCV RBC AUTO: 82.6 FL
MONOCYTES # BLD AUTO: 0.68 X10(3) UL (ref 0.1–1)
MONOCYTES NFR BLD AUTO: 7.9 %
NEUTROPHILS # BLD AUTO: 5.93 X10 (3) UL (ref 1.5–7.7)
NEUTROPHILS # BLD AUTO: 5.93 X10(3) UL (ref 1.5–7.7)
NEUTROPHILS NFR BLD AUTO: 68.6 %
PLATELET # BLD AUTO: 249 10(3)UL (ref 150–450)
RBC # BLD AUTO: 3.5 X10(6)UL
TIBC SERPL-MCNC: 356 UG/DL (ref 240–450)
TRANSFERRIN SERPL-MCNC: 239 MG/DL (ref 200–360)
WBC # BLD AUTO: 8.6 X10(3) UL (ref 4–11)

## 2022-10-29 PROCEDURE — 84466 ASSAY OF TRANSFERRIN: CPT

## 2022-10-29 PROCEDURE — 83540 ASSAY OF IRON: CPT

## 2022-10-29 PROCEDURE — 36415 COLL VENOUS BLD VENIPUNCTURE: CPT

## 2022-10-29 PROCEDURE — 85025 COMPLETE CBC W/AUTO DIFF WBC: CPT

## 2022-10-31 RX ORDER — AMLODIPINE BESYLATE 2.5 MG/1
TABLET ORAL
Qty: 90 TABLET | Refills: 1 | Status: SHIPPED | OUTPATIENT
Start: 2022-10-31

## 2022-10-31 NOTE — TELEPHONE ENCOUNTER
Please review; protocol failed/no protocol.      Requested Prescriptions   Pending Prescriptions Disp Refills    AMLODIPINE 2.5 MG Oral Tab [Pharmacy Med Name: AMLODIPINE BESYLATE 2.5MG TABLETS] 90 tablet 1     Sig: TAKE 1 TABLET(2.5 MG) BY MOUTH DAILY       Hypertensive Medications Protocol Failed - 10/31/2022  8:55 AM        Failed - Last BP reading less than 140/90     BP Readings from Last 1 Encounters:  10/21/22 : 160/64              Passed - In person appointment in the past 12 or next 3 months     Recent Outpatient Visits              1 week ago Anemia, unspecified type    Janna Oshea MD    Office Visit    2 months ago Type 2 diabetes mellitus without complication, without long-term current use of insulin [facility])    [address], [address], Amarilys Polanco MD    Office Visit    5 months ago Type 2 diabetes mellitus without complication, without long-term current use of insulin [facility])    [address] [facility], Amarilys Polanco MD    Office Visit    9 months ago Encounter for annual health examination    [address] [place] [address], Loulou Bravo MD    Office Visit    11 months ago Essential hypertension    [address], [address], Amarilys Polanco MD    Office Visit          Future Appointments         Provider Department Appt Notes    In 3 weeks Lucinda Nguyen MD [address], [address] 3 mo f/u               Passed - CMP or BMP in past 6 months     Recent Results (from the past 4392 hour(s))   Basic Metabolic Panel (8)    Collection Time: 10/10/22  4:13 PM   Result Value Ref Range    Glucose 160 (H) 70 - 99 mg/dL    Sodium 139 136 - 145 mmol/L    Potassium 4.6 3.5 - 5.1 mmol/L    Chloride 109 98 - 112 mmol/L    CO2 23.0 21.0 - 32.0 mmol/L    Anion Gap 7 0 - 18 mmol/L    BUN 24 (H) 7 - 18 mg/dL    Creatinine 0.91 0.70 - 1.30 mg/dL    BUN/CREA Ratio 26.4 (H) 10.0 - 20.0    Calcium, Total 9.1 8.5 - 10.1 mg/dL    Calculated Osmolality 295 275 - 295 mOsm/kg    eGFR-Cr 83 >=60 mL/min/1.73m2     *Note: Due to a large number of results and/or encounters for the requested time period, some results have not been displayed. A complete set of results can be found in Results Review.                Passed - In person appointment or virtual visit in the past 6 months     Recent Outpatient Visits              1 week ago Anemia, unspecified type    Lynette Arredondo MD    Office Visit    2 months ago Type 2 diabetes mellitus without complication, without long-term current use of insulin [facility])    [address] [place], [address], Bryan Traylor MD    Office Visit    5 months ago Type 2 diabetes mellitus without complication, without long-term current use of insulin [facility])    [address], [address], Agueda Polanco MD    Office Visit    9 months ago Encounter for [employer], [address], Bryan Traylor MD    Office Visit    11 months ago Essential hypertension    [address], [address], Agueda Polanco MD    Office Visit          Future Appointments         Provider Department Appt Notes    In 3 weeks Afua Menchaca MD [address], [address] 3 mo f/u               [facility] or GFRNAA > 50     GFR Evaluation  EGFRCR: 83 , resulted on 10/10/2022                Recent Outpatient Visits              1 week ago Anemia, unspecified type    Lynette Arredondo MD    Office Visit    2 months ago Type 2 diabetes mellitus without complication, without long-term current use of insulin [facility])    [address] [place], [address], Agueda Polanco MD    Office Visit    5 months ago Type 2 diabetes mellitus without complication, without long-term current use of insulin [facility])    [address] [place], [address], Agueda Polanco MD Office Visit    9 months ago Encounter for annual health examination    Minus MD Joaquín    Office Visit    11 months ago Essential hypertension    [facility], [facility], Collin Jones Edith Leriche, MD    Office Visit             Future Appointments         Provider Department Appt Notes    In 3 weeks Radha Cortés MD [facility], [facility], [address] 3 mo f/u

## 2022-10-31 NOTE — TELEPHONE ENCOUNTER
Patient calling for Amlodipine 2.5 mg. He is out of medication. [facility] (on file) Urgent, patient says.

## 2022-11-09 RX ORDER — DOCUSATE SODIUM 50 MG AND SENNOSIDES 8.6 MG 8.6; 5 MG/1; MG/1
1 TABLET, FILM COATED ORAL DAILY
Qty: 90 TABLET | Refills: 1 | Status: SHIPPED | OUTPATIENT
Start: 2022-11-09

## 2022-11-21 ENCOUNTER — IMMUNIZATION (OUTPATIENT)
Dept: INTERNAL MEDICINE CLINIC | Facility: CLINIC | Age: 87
End: 2022-11-21
Payer: MEDICARE

## 2022-11-21 DIAGNOSIS — Z23 NEED FOR VACCINATION: Primary | ICD-10-CM

## 2022-11-21 PROCEDURE — 90662 IIV NO PRSV INCREASED AG IM: CPT | Performed by: INTERNAL MEDICINE

## 2022-11-21 PROCEDURE — G0008 ADMIN INFLUENZA VIRUS VAC: HCPCS | Performed by: INTERNAL MEDICINE

## 2022-11-30 ENCOUNTER — LAB ENCOUNTER (OUTPATIENT)
Dept: LAB | Age: 87
End: 2022-11-30
Attending: INTERNAL MEDICINE
Payer: MEDICARE

## 2022-11-30 ENCOUNTER — OFFICE VISIT (OUTPATIENT)
Dept: INTERNAL MEDICINE CLINIC | Facility: CLINIC | Age: 87
End: 2022-11-30
Payer: MEDICARE

## 2022-11-30 VITALS
SYSTOLIC BLOOD PRESSURE: 146 MMHG | HEIGHT: 60 IN | BODY MASS INDEX: 22.38 KG/M2 | TEMPERATURE: 98 F | WEIGHT: 114 LBS | RESPIRATION RATE: 18 BRPM | DIASTOLIC BLOOD PRESSURE: 66 MMHG | HEART RATE: 66 BPM

## 2022-11-30 DIAGNOSIS — D64.9 ANEMIA, UNSPECIFIED TYPE: ICD-10-CM

## 2022-11-30 DIAGNOSIS — I10 ESSENTIAL HYPERTENSION: ICD-10-CM

## 2022-11-30 DIAGNOSIS — E11.9 TYPE 2 DIABETES MELLITUS WITHOUT COMPLICATION, WITHOUT LONG-TERM CURRENT USE OF INSULIN (HCC): ICD-10-CM

## 2022-11-30 DIAGNOSIS — E11.9 TYPE 2 DIABETES MELLITUS WITHOUT COMPLICATION, WITHOUT LONG-TERM CURRENT USE OF INSULIN (HCC): Primary | ICD-10-CM

## 2022-11-30 LAB
BASOPHILS # BLD AUTO: 0.04 X10(3) UL (ref 0–0.2)
BASOPHILS NFR BLD AUTO: 0.5 %
CARTRIDGE LOT#: 928 NUMERIC
CREAT UR-SCNC: 23.9 MG/DL
DEPRECATED RDW RBC AUTO: 46.8 FL (ref 35.1–46.3)
EOSINOPHIL # BLD AUTO: 0.44 X10(3) UL (ref 0–0.7)
EOSINOPHIL NFR BLD AUTO: 5.9 %
ERYTHROCYTE [DISTWIDTH] IN BLOOD BY AUTOMATED COUNT: 16 % (ref 11–15)
HCT VFR BLD AUTO: 31.4 %
HEMOGLOBIN A1C: 5.8 % (ref 4.3–5.6)
HGB BLD-MCNC: 9.9 G/DL
IMM GRANULOCYTES # BLD AUTO: 0.01 X10(3) UL (ref 0–1)
IMM GRANULOCYTES NFR BLD: 0.1 %
LYMPHOCYTES # BLD AUTO: 1.63 X10(3) UL (ref 1–4)
LYMPHOCYTES NFR BLD AUTO: 22 %
MCH RBC QN AUTO: 25.5 PG (ref 26–34)
MCHC RBC AUTO-ENTMCNC: 31.5 G/DL (ref 31–37)
MCV RBC AUTO: 80.9 FL
MICROALBUMIN UR-MCNC: 101 MG/DL
MICROALBUMIN/CREAT 24H UR-RTO: 4225.9 UG/MG (ref ?–30)
MONOCYTES # BLD AUTO: 0.72 X10(3) UL (ref 0.1–1)
MONOCYTES NFR BLD AUTO: 9.7 %
NEUTROPHILS # BLD AUTO: 4.57 X10 (3) UL (ref 1.5–7.7)
NEUTROPHILS # BLD AUTO: 4.57 X10(3) UL (ref 1.5–7.7)
NEUTROPHILS NFR BLD AUTO: 61.8 %
PLATELET # BLD AUTO: 224 10(3)UL (ref 150–450)
RBC # BLD AUTO: 3.88 X10(6)UL
WBC # BLD AUTO: 7.4 X10(3) UL (ref 4–11)

## 2022-11-30 PROCEDURE — 1126F AMNT PAIN NOTED NONE PRSNT: CPT | Performed by: INTERNAL MEDICINE

## 2022-11-30 PROCEDURE — 82043 UR ALBUMIN QUANTITATIVE: CPT

## 2022-11-30 PROCEDURE — 83036 HEMOGLOBIN GLYCOSYLATED A1C: CPT | Performed by: INTERNAL MEDICINE

## 2022-11-30 PROCEDURE — 85025 COMPLETE CBC W/AUTO DIFF WBC: CPT

## 2022-11-30 PROCEDURE — 99214 OFFICE O/P EST MOD 30 MIN: CPT | Performed by: INTERNAL MEDICINE

## 2022-11-30 PROCEDURE — 3008F BODY MASS INDEX DOCD: CPT | Performed by: INTERNAL MEDICINE

## 2022-11-30 PROCEDURE — 3077F SYST BP >= 140 MM HG: CPT | Performed by: INTERNAL MEDICINE

## 2022-11-30 PROCEDURE — 36415 COLL VENOUS BLD VENIPUNCTURE: CPT

## 2022-11-30 PROCEDURE — 82570 ASSAY OF URINE CREATININE: CPT

## 2022-11-30 PROCEDURE — 3078F DIAST BP <80 MM HG: CPT | Performed by: INTERNAL MEDICINE

## 2022-11-30 RX ORDER — AMLODIPINE BESYLATE 5 MG/1
5 TABLET ORAL DAILY
Qty: 90 TABLET | Refills: 1 | Status: SHIPPED | OUTPATIENT
Start: 2022-11-30

## 2023-01-18 RX ORDER — ALLOPURINOL 300 MG/1
300 TABLET ORAL DAILY
Qty: 90 TABLET | Refills: 1 | Status: SHIPPED | OUTPATIENT
Start: 2023-01-18

## 2023-01-18 NOTE — TELEPHONE ENCOUNTER
Patient requesting refill of   allopurinol 300 MG Oral Tab  Please send to:  [facility] [employer], [address]

## 2023-02-08 ENCOUNTER — TELEPHONE (OUTPATIENT)
Dept: INTERNAL MEDICINE CLINIC | Facility: CLINIC | Age: 88
End: 2023-02-08

## 2023-02-08 DIAGNOSIS — H26.492 LEFT POSTERIOR CAPSULAR OPACIFICATION: Primary | ICD-10-CM

## 2023-02-08 NOTE — TELEPHONE ENCOUNTER
Received fax request for Ophthalmology follow up visit on 2/10/23. Referral pending in EMR JSK please advise.

## 2023-02-08 NOTE — TELEPHONE ENCOUNTER
Patient is requesting refill of medication listed below. Patient will be out of medication today.     losartan 100 MG Oral Tab  terazosin 2 MG Oral Cap    Bernie, [place]

## 2023-02-09 RX ORDER — TERAZOSIN 2 MG/1
CAPSULE ORAL
Qty: 90 CAPSULE | Refills: 1 | Status: SHIPPED | OUTPATIENT
Start: 2023-02-09

## 2023-02-09 RX ORDER — LOSARTAN POTASSIUM 100 MG/1
TABLET ORAL
Qty: 90 TABLET | Refills: 1 | Status: SHIPPED | OUTPATIENT
Start: 2023-02-09

## 2023-02-09 NOTE — TELEPHONE ENCOUNTER
Please review. Protocol Failed or has No Protocol.     Requested Prescriptions   Pending Prescriptions Disp Refills    TERAZOSIN 2 MG Oral Cap [Pharmacy Med Name: TERAZOSIN 2MG CAPSULES] 90 capsule 1     Sig: TAKE 1 CAPSULE(2 MG) BY MOUTH EVERY NIGHT       Hypertensive Medications Protocol Failed - 2/8/2023  5:33 PM        Failed - Last BP reading less than 140/90     BP Readings from Last 1 Encounters:  11/30/22 : 146/66              Passed - In person appointment in the past 12 or next 3 months     Recent Outpatient Visits              2 months ago Type 2 diabetes mellitus without complication, without long-term current use of insulin ([facility] [address] [address]    Lacretia Dicker, Cherylene Rotter, [place], Ramsey Jarred, MD    Office Visit    3 months ago Anemia, unspecified type    Collin Merlos Ramsey Jarred, MD    Office Visit    5 months ago Type 2 diabetes mellitus without complication, without long-term current use of insulin ([address]    Luma Candelario, [address], Brian Polanco MD    Office Visit    9 months ago Type 2 diabetes mellitus without complication, without long-term current use of insulin ([facility] [address]    [facility]uma Candelario, [address], Tricia Miramontes MD    Office Visit    1 year ago Encounter for annual health examination    Tricia Mcclellan MD    Office Visit          Future Appointments         Provider Department Appt Notes    In 2 weeks Silver Morin MD [address] [employer] 3 month follow up NEED  [address] or BMP in past 6 months     Recent Results (from the past 4392 hour(s))   Basic Metabolic Panel (8)    Collection Time: 10/10/22  4:13 PM   Result Value Ref Range    Glucose 160 (H) 70 - 99 mg/dL    Sodium 139 136 - 145 mmol/L    Potassium 4.6 3.5 - 5.1 mmol/L    Chloride 109 98 - 112 mmol/L    CO2 23.0 21.0 - 32.0 mmol/L    Anion Gap 7 0 - 18 mmol/L    BUN 24 (H) 7 - 18 mg/dL    Creatinine 0.91 0.70 - 1.30 mg/dL    BUN/CREA Ratio 26.4 (H) 10.0 - 20.0    Calcium, Total 9.1 8.5 - 10.1 mg/dL    Calculated Osmolality 295 275 - 295 mOsm/kg    eGFR-Cr 83 >=60 mL/min/1.73m2     *Note: Due to a large number of results and/or encounters for the requested time period, some results have not been displayed. A complete set of results can be found in Results Review.                Passed - In person appointment or virtual visit in the past 6 months     Recent Outpatient Visits              2 months ago Type 2 diabetes mellitus without complication, without long-term current use of insulin ([address]    Collin Alonso Ane Browning, MD    Office Visit    3 months ago Anemia, unspecified type    [facility], [address] [facility]    Office Visit    5 months ago Type 2 diabetes mellitus without complication, without long-term current use of insulin ([address]    [address] [facility],[address], [address] [facility],[address], Meka Polanco MD    Office Visit    9 months ago Type 2 diabetes mellitus without complication, without long-term current use of insulin ([address]    [address] Col[address] Ane Browning, MD    Office Visit    1 year ago Encounter for annual health examination    [address] Edgar Mack MD    Office Visit          Future Appointments         Provider Department Appt Notes    In 2 weeks MD Meka Jacobs [place] 3 month follow up NEED  [address] or GFRNAA > 50     GFR Evaluation  EGFRCR: 83 , resulted on 10/10/2022            LOSARTAN 100 MG Oral Tab [Pharmacy Med Name: LOSARTAN 100MG TABLETS] 90 tablet 1     Sig: TAKE 1 TABLET(100 MG) BY MOUTH DAILY Hypertensive Medications Protocol Failed - 2/8/2023  5:33 PM        Failed - Last BP reading less than 140/90     BP Readings from Last 1 Encounters:  11/30/22 : 146/66              Passed - In person appointment in the past 12 or next 3 months     Recent Outpatient Visits              2 months ago Type 2 diabetes mellitus without complication, without long-term current use of insulin ([facility])    Collin Clark Collis Pottier, MD    Office Visit    3 months ago Anemia, unspecified type    Collin Clark Collis Pottier, MD    Office Visit    5 months ago Type 2 diabetes mellitus without complication, without long-term current use of insulin ([address]    [address] [place],[address], [address] [facility],[address], Praveen Polanco MD    Office Visit    9 months ago Type 2 diabetes mellitus without complication, without long-term current use of insulin ([facility] [address]    [address] [facility]    Office Visit    1 year ago Encounter for annual health examination    [address] [facility]    Office Visit          Future Appointments         Provider Department Appt Notes    In 2 weeks Lisha Fry MD [facility], [place] [address] [facility] 3 month follow up NEED  [address] or BMP in past 6 months     Recent Results (from the past 4392 hour(s))   Basic Metabolic Panel (8)    Collection Time: 10/10/22  4:13 PM   Result Value Ref Range    Glucose 160 (H) 70 - 99 mg/dL    Sodium 139 136 - 145 mmol/L    Potassium 4.6 3.5 - 5.1 mmol/L    Chloride 109 98 - 112 mmol/L    CO2 23.0 21.0 - 32.0 mmol/L    Anion Gap 7 0 - 18 mmol/L    BUN 24 (H) 7 - 18 mg/dL    Creatinine 0.91 0.70 - 1.30 mg/dL    BUN/CREA Ratio 26.4 (H) 10.0 - 20.0    Calcium, Total 9.1 8.5 - 10.1 mg/dL    Calculated Osmolality 295 275 - 295 mOsm/kg eGFR-Cr 83 >=60 mL/min/1.73m2     *Note: Due to a large number of results and/or encounters for the requested time period, some results have not been displayed. A complete set of results can be found in Results Review.                Passed - In person appointment or virtual visit in the past 6 months     Recent Outpatient Visits              2 months ago Type 2 diabetes mellitus without complication, without long-term current use of insulin ([address]    Collin Birch Deeann [place], MD    Office Visit    3 months ago Anemia, unspecified type    Collin Birch Deeann [place], MD    Office Visit    5 months ago Type 2 diabetes mellitus without complication, without long-term current use of insulin ([address]    [address], [address] [facility],[address], Sima Polanco MD    Office Visit    9 months ago Type 2 diabetes mellitus without complication, without long-term current use of insulin ([address]    [address] Col[address] Deeann [place], MD    Office Visit    1 year ago Encounter for annual health examination    [address] [facility]    Office Visit          Future Appointments         Provider Department Appt Notes    In 2 weeks Evelyn Dean MD [address], [place] [place] 3 month follow up NEED TO [address] [place] [address] or GFRNAA > 50     GFR Evaluation  EGFRCR: 83 , resulted on 10/10/2022               Recent Outpatient Visits              2 months ago Type 2 diabetes mellitus without complication, without long-term current use of insulin [facility])    Fran Birch MD    Office Visit    3 months ago Anemia, unspecified type    Fran Birch MD    Office Visit    5 months ago Type 2 diabetes mellitus without complication, without long-term current use of insulin ([facility]    Tatyana Welch, [address], Mala Polanco MD    Office Visit    9 months ago Type 2 diabetes mellitus without complication, without long-term current use of insulin ([address]    Tatyana Welch, [address], Mala Polanco MD    Office Visit    1 year ago Encounter for annual health examination    Josse Leggett MD    Office Visit            Future Appointments         Provider Department Appt Notes    In 2 weeks Lincoln Wong MD [address] 3 month follow up NEED TO [facility]

## 2023-02-27 ENCOUNTER — OFFICE VISIT (OUTPATIENT)
Facility: CLINIC | Age: 88
End: 2023-02-27

## 2023-02-27 VITALS
TEMPERATURE: 98 F | WEIGHT: 113 LBS | BODY MASS INDEX: 22.19 KG/M2 | RESPIRATION RATE: 18 BRPM | DIASTOLIC BLOOD PRESSURE: 66 MMHG | HEIGHT: 60 IN | HEART RATE: 72 BPM | SYSTOLIC BLOOD PRESSURE: 142 MMHG

## 2023-02-27 DIAGNOSIS — E61.1 IRON DEFICIENCY: ICD-10-CM

## 2023-02-27 DIAGNOSIS — I25.119 ATHEROSCLEROSIS OF NATIVE CORONARY ARTERY OF NATIVE HEART WITH ANGINA PECTORIS (HCC): ICD-10-CM

## 2023-02-27 DIAGNOSIS — Z23 NEED FOR VACCINATION: ICD-10-CM

## 2023-02-27 DIAGNOSIS — I51.89 DIASTOLIC DYSFUNCTION: ICD-10-CM

## 2023-02-27 DIAGNOSIS — K21.9 GASTROESOPHAGEAL REFLUX DISEASE, UNSPECIFIED WHETHER ESOPHAGITIS PRESENT: ICD-10-CM

## 2023-02-27 DIAGNOSIS — E11.9 TYPE 2 DIABETES MELLITUS WITHOUT COMPLICATION, WITHOUT LONG-TERM CURRENT USE OF INSULIN (HCC): ICD-10-CM

## 2023-02-27 DIAGNOSIS — I10 ESSENTIAL HYPERTENSION: Primary | ICD-10-CM

## 2023-02-27 PROCEDURE — 90677 PCV20 VACCINE IM: CPT | Performed by: INTERNAL MEDICINE

## 2023-02-27 PROCEDURE — 99214 OFFICE O/P EST MOD 30 MIN: CPT | Performed by: INTERNAL MEDICINE

## 2023-02-27 PROCEDURE — 3078F DIAST BP <80 MM HG: CPT | Performed by: INTERNAL MEDICINE

## 2023-02-27 PROCEDURE — G0009 ADMIN PNEUMOCOCCAL VACCINE: HCPCS | Performed by: INTERNAL MEDICINE

## 2023-02-27 PROCEDURE — 3077F SYST BP >= 140 MM HG: CPT | Performed by: INTERNAL MEDICINE

## 2023-02-27 PROCEDURE — 3008F BODY MASS INDEX DOCD: CPT | Performed by: INTERNAL MEDICINE

## 2023-02-27 RX ORDER — AMLODIPINE BESYLATE 2.5 MG/1
TABLET ORAL
COMMUNITY
Start: 2023-02-05 | End: 2023-02-27

## 2023-02-27 RX ORDER — PREDNISOLONE ACETATE 10 MG/ML
SUSPENSION/ DROPS OPHTHALMIC
COMMUNITY
Start: 2023-02-01

## 2023-03-08 RX ORDER — CLOPIDOGREL BISULFATE 75 MG/1
75 TABLET ORAL DAILY
Qty: 90 TABLET | Refills: 1 | Status: SHIPPED | OUTPATIENT
Start: 2023-03-08

## 2023-03-08 NOTE — TELEPHONE ENCOUNTER
Please review; protocol failed. Requested Prescriptions   Pending Prescriptions Disp Refills    clopidogrel 75 MG Oral Tab 90 tablet 1     Sig: Take 1 tablet (75 mg total) by mouth daily.        There is no refill protocol information for this order          Future Appointments         Provider Department Appt Notes    In 2 months Cardell Essex, MD [facility], [place] [address] [facility] FOLLOW UP DM  POLICY INF *BA  need to sched MA Visit            Recent Outpatient Visits              1 week ago Essential hypertension    [address], [address] [facility], Randolph Polanco MD    Office Visit    3 months ago Type 2 diabetes mellitus without complication, without long-term current use of insulin [facility])    [address], [address] Gilberto Suggs MD    Office Visit    4 months ago Anemia, unspecified type    Thersa Dear, Randolph Polanco MD    Office Visit    6 months ago Type 2 diabetes mellitus without complication, without long-term current use of insulin [facility])    [address], [address] [facility],[address], Randolph Polanco MD    Office Visit    10 months ago Type 2 diabetes mellitus without complication, without long-term current use of insulin [facility])    Gilberto Painting MD    Office Visit

## 2023-03-10 ENCOUNTER — TELEPHONE (OUTPATIENT)
Dept: INTERNAL MEDICINE CLINIC | Facility: CLINIC | Age: 88
End: 2023-03-10

## 2023-03-10 NOTE — TELEPHONE ENCOUNTER
Patient had appointment with you on 02-27-23 for follow up NO PRE OP.  Would you clear patient from the visit or will he need to make pre op appointment  with you before being cleared

## 2023-03-10 NOTE — TELEPHONE ENCOUNTER
Patient calling to request authorization for cataract surgery be sent to Dr. Naresh Lundberg be faxed to [phone]. Stated saw Dr. Hanna Mireles last week. Procedure is schedule for 03/17/2023.

## 2023-03-29 NOTE — TELEPHONE ENCOUNTER
Patient requesting refill of:  ferrous sulfate 325 (65 FE) MG Oral Tab EC metoprolol succinate  MG Oral Tablet 24 Hr  Please send to:  [facility] #[id number] - [place], [address] [place] [address]     Patient is low on medication

## 2023-03-30 RX ORDER — MELATONIN
325
Qty: 36 TABLET | Refills: 3 | Status: SHIPPED | OUTPATIENT
Start: 2023-03-31

## 2023-03-30 NOTE — TELEPHONE ENCOUNTER
Please review; no protocol  Medication pended for your review and approval.     Requested Prescriptions   Pending Prescriptions Disp Refills    ferrous sulfate 325 (65 FE) MG Oral Tab EC 36 tablet 3     Sig: Take 1 tablet (325 mg total) by mouth 3 (three) times a week.        There is no refill protocol information for this order

## 2023-04-04 RX ORDER — METOPROLOL SUCCINATE 100 MG/1
150 TABLET, EXTENDED RELEASE ORAL DAILY
Qty: 135 TABLET | Refills: 3 | Status: SHIPPED | OUTPATIENT
Start: 2023-04-04

## 2023-04-04 NOTE — TELEPHONE ENCOUNTER
Please review.  Protocol failed/No protocol      Requested Prescriptions   Pending Prescriptions Disp Refills    METOPROLOL SUCCINATE  MG Oral Tablet 24 Hr [Pharmacy Med Name: METOPROLOL ER SUCCINATE 100MG TABS] 135 tablet 3     Sig: TAKE 1 AND 1/2 TABLETS(150 MG) BY MOUTH DAILY       Hypertensive Medications Protocol Failed - 4/4/2023 12:29 PM        Failed - Last BP reading less than 140/90     BP Readings from Last 1 Encounters:  02/27/23 : 142/66              Passed - In person appointment in the past 12 or next 3 months     Recent Outpatient Visits              1 month ago Essential hypertension    Panda Murcia [address] [place] [address], Carlos Polanco MD    Office Visit    4 months ago Type 2 diabetes mellitus without complication, without long-term current use of insulin [facility])    Cele Daniel MD    Office Visit    5 months ago Anemia, unspecified type    Collin Daniel Adams [place], MD    Office Visit    7 months ago Type 2 diabetes mellitus without complication, without long-term current use of insulin ([address]    Panda Murcia, [address], Carlos Polanco MD    Office Visit    11 months ago Type 2 diabetes mellitus without complication, without long-term current use of insulin ([address]    Panda Murcia, [address], Cele Paulino MD    Office Visit          Future Appointments         Provider Department Appt Notes    In 1 month MD Panda Chavarria, [address] [place] [address] FOLLOW UP DM  POLICY INF *BA  need to sched [place] Visit               Passed - CMP or BMP in past 6 months     Recent Results (from the past 4392 hour(s))   Basic Metabolic Panel (8)    Collection Time: 10/10/22  4:13 PM   Result Value Ref Range    Glucose 160 (H) 70 - 99 mg/dL    Sodium 139 136 - 145 mmol/L    Potassium 4.6 3.5 - 5.1 mmol/L    Chloride 109 98 - 112 mmol/L    CO2 23.0 21.0 - 32.0 mmol/L    Anion Gap 7 0 - 18 mmol/L    BUN 24 (H) 7 - 18 mg/dL    Creatinine 0.91 0.70 - 1.30 mg/dL    BUN/CREA Ratio 26.4 (H) 10.0 - 20.0    Calcium, Total 9.1 8.5 - 10.1 mg/dL    Calculated Osmolality 295 275 - 295 mOsm/kg    eGFR-Cr 83 >=60 mL/min/1.73m2     *Note: Due to a large number of results and/or encounters for the requested time period, some results have not been displayed. A complete set of results can be found in Results Review.                Passed - In person appointment or virtual visit in the past 6 months     Recent Outpatient Visits              1 month ago Essential hypertension    [address], 602 [facility] Staci Polanco MD    Office Visit    4 months ago Type 2 diabetes mellitus without complication, without long-term current use of insulin [facility])    Caty López MD    Office Visit    5 months ago Anemia, unspecified type    Collin López Carmella Maxim, MD    Office Visit    7 months ago Type 2 diabetes mellitus without complication, without long-term current use of insulin [facility])    [address], [address] [facility],[address], Staci Polanco MD    Office Visit    11 months ago Type 2 diabetes mellitus without complication, without long-term current use of insulin ([address]    Caty Link MD    Office Visit          Future Appointments         Provider Department Appt Notes    In 1 month Dorcas Vargas MD [address], 602 [facility] FOLLOW UP DM  POLICY INF *BA  need to sched [place] Visit               Passed - EGFRCR or GFRNAA > 50     GFR Evaluation  EGFRCR: 83 , resulted on 10/10/2022               Recent Outpatient Visits              1 month ago Essential hypertension    [facility], 602 [facility], Mary Polanco MD    Office Visit    4 months ago Type 2 diabetes mellitus without complication, without long-term current use of insulin [facility])    Christianna November, Coby Brunner, MD    Office Visit    5 months ago Anemia, unspecified type    Collin Ponce Jenean Hays, MD    Office Visit    7 months ago Type 2 diabetes mellitus without complication, without long-term current use of insulin [facility])    Clemaracely Urban, [address] [facility],[address], Mary Polanco MD    Office Visit    11 months ago Type 2 diabetes mellitus without complication, without long-term current use of insulin ([facility]    Lillian Avelar, Coby Brunner, MD    Office Visit            Future Appointments         Provider Department Appt Notes    In 1 month MD John GarciaMaria [facility] Medical [facility] [place] [address] [facility] FOLLOW UP DM  POLICY INF *BA  need to sched [place] Visit

## 2023-04-12 ENCOUNTER — LAB ENCOUNTER (OUTPATIENT)
Dept: LAB | Facility: HOSPITAL | Age: 88
End: 2023-04-12
Attending: INTERNAL MEDICINE
Payer: MEDICARE

## 2023-04-12 DIAGNOSIS — E11.9 TYPE 2 DIABETES MELLITUS WITHOUT COMPLICATION, WITHOUT LONG-TERM CURRENT USE OF INSULIN (HCC): ICD-10-CM

## 2023-04-12 DIAGNOSIS — E61.1 IRON DEFICIENCY: ICD-10-CM

## 2023-04-12 LAB
ALBUMIN SERPL-MCNC: 3.1 G/DL (ref 3.4–5)
ALBUMIN/GLOB SERPL: 0.8 {RATIO} (ref 1–2)
ALP LIVER SERPL-CCNC: 82 U/L
ALT SERPL-CCNC: 19 U/L
ANION GAP SERPL CALC-SCNC: 6 MMOL/L (ref 0–18)
AST SERPL-CCNC: 9 U/L (ref 15–37)
BASOPHILS # BLD AUTO: 0.08 X10(3) UL (ref 0–0.2)
BASOPHILS NFR BLD AUTO: 0.8 %
BILIRUB SERPL-MCNC: 0.3 MG/DL (ref 0.1–2)
BUN BLD-MCNC: 21 MG/DL (ref 7–18)
BUN/CREAT SERPL: 24.1 (ref 10–20)
CALCIUM BLD-MCNC: 9.2 MG/DL (ref 8.5–10.1)
CHLORIDE SERPL-SCNC: 110 MMOL/L (ref 98–112)
CHOLEST SERPL-MCNC: 78 MG/DL (ref ?–200)
CO2 SERPL-SCNC: 24 MMOL/L (ref 21–32)
CREAT BLD-MCNC: 0.87 MG/DL
DEPRECATED RDW RBC AUTO: 43.8 FL (ref 35.1–46.3)
EOSINOPHIL # BLD AUTO: 0.97 X10(3) UL (ref 0–0.7)
EOSINOPHIL NFR BLD AUTO: 9.3 %
ERYTHROCYTE [DISTWIDTH] IN BLOOD BY AUTOMATED COUNT: 14.7 % (ref 11–15)
EST. AVERAGE GLUCOSE BLD GHB EST-MCNC: 157 MG/DL (ref 68–126)
FASTING PATIENT LIPID ANSWER: YES
FASTING STATUS PATIENT QL REPORTED: YES
GFR SERPLBLD BASED ON 1.73 SQ M-ARVRAT: 85 ML/MIN/1.73M2 (ref 60–?)
GLOBULIN PLAS-MCNC: 3.7 G/DL (ref 2.8–4.4)
GLUCOSE BLD-MCNC: 138 MG/DL (ref 70–99)
HBA1C MFR BLD: 7.1 % (ref ?–5.7)
HCT VFR BLD AUTO: 32.2 %
HDLC SERPL-MCNC: 46 MG/DL (ref 40–59)
HGB BLD-MCNC: 10 G/DL
IMM GRANULOCYTES # BLD AUTO: 0.02 X10(3) UL (ref 0–1)
IMM GRANULOCYTES NFR BLD: 0.2 %
IRON SATN MFR SERPL: 14 %
IRON SERPL-MCNC: 40 UG/DL
LDLC SERPL CALC-MCNC: 22 MG/DL (ref ?–100)
LYMPHOCYTES # BLD AUTO: 1.39 X10(3) UL (ref 1–4)
LYMPHOCYTES NFR BLD AUTO: 13.4 %
MCH RBC QN AUTO: 25.3 PG (ref 26–34)
MCHC RBC AUTO-ENTMCNC: 31.1 G/DL (ref 31–37)
MCV RBC AUTO: 81.5 FL
MONOCYTES # BLD AUTO: 0.7 X10(3) UL (ref 0.1–1)
MONOCYTES NFR BLD AUTO: 6.7 %
NEUTROPHILS # BLD AUTO: 7.24 X10 (3) UL (ref 1.5–7.7)
NEUTROPHILS # BLD AUTO: 7.24 X10(3) UL (ref 1.5–7.7)
NEUTROPHILS NFR BLD AUTO: 69.6 %
NONHDLC SERPL-MCNC: 32 MG/DL (ref ?–130)
OSMOLALITY SERPL CALC.SUM OF ELEC: 295 MOSM/KG (ref 275–295)
PLATELET # BLD AUTO: 297 10(3)UL (ref 150–450)
POTASSIUM SERPL-SCNC: 4.3 MMOL/L (ref 3.5–5.1)
PROT SERPL-MCNC: 6.8 G/DL (ref 6.4–8.2)
RBC # BLD AUTO: 3.95 X10(6)UL
SODIUM SERPL-SCNC: 140 MMOL/L (ref 136–145)
TIBC SERPL-MCNC: 288 UG/DL (ref 240–450)
TRANSFERRIN SERPL-MCNC: 193 MG/DL (ref 200–360)
TRIGL SERPL-MCNC: 25 MG/DL (ref 30–149)
TSI SER-ACNC: 1.31 MIU/ML (ref 0.36–3.74)
VLDLC SERPL CALC-MCNC: 3 MG/DL (ref 0–30)
WBC # BLD AUTO: 10.4 X10(3) UL (ref 4–11)

## 2023-04-12 PROCEDURE — 85025 COMPLETE CBC W/AUTO DIFF WBC: CPT

## 2023-04-12 PROCEDURE — 84443 ASSAY THYROID STIM HORMONE: CPT

## 2023-04-12 PROCEDURE — 83036 HEMOGLOBIN GLYCOSYLATED A1C: CPT

## 2023-04-12 PROCEDURE — 80053 COMPREHEN METABOLIC PANEL: CPT

## 2023-04-12 PROCEDURE — 36415 COLL VENOUS BLD VENIPUNCTURE: CPT

## 2023-04-12 PROCEDURE — 83540 ASSAY OF IRON: CPT

## 2023-04-12 PROCEDURE — 80061 LIPID PANEL: CPT

## 2023-04-12 PROCEDURE — 84466 ASSAY OF TRANSFERRIN: CPT

## 2023-04-27 NOTE — TELEPHONE ENCOUNTER
Please review. Protocol failed/No protocol      Requested Prescriptions   Pending Prescriptions Disp Refills    furosemide 20 MG Oral Tab 90 tablet 3     Sig: Take 1 tablet (20 mg total) by mouth daily.        Hypertensive Medications Protocol Failed - 4/26/2023  6:27 PM        Failed - Last BP reading less than 140/90     BP Readings from Last 1 Encounters:  02/27/23 : 142/66                Passed - In person appointment in the past 12 or next 3 months     Recent Outpatient Visits              1 month ago Essential hypertension    [address], [address] [place] Collin Casiano Elodie Bora, MD    Office Visit    4 months ago Type 2 diabetes mellitus without complication, without long-term current use of insulin [facility])    [address] [facility] Collin Casiano Elodie Bora, MD    Office Visit    6 months ago Anemia, unspecified type    [address] [facility] Collin Casiano Elodie Bora, MD    Office Visit    8 months ago Type 2 diabetes mellitus without complication, without long-term current use of insulin ([facility]    [address], 7[address] [facility],[address], Rose Polanco MD    Office Visit    11 months ago Type 2 diabetes mellitus without complication, without long-term current use of insulin ([facility]    [address], [address] [facility],[address], Emmy Correa MD    Office Visit          Future Appointments         Provider Department Appt Notes    In 1 week Arely Olson MD [address], [address] [facility] FOLLOW UP DM  POLICY INF *BA  need to sched [place] Visit               Passed - CMP or BMP in past 6 months     Recent Results (from the past 4392 hour(s))   COMP METABOLIC PANEL (14)    Collection Time: 04/12/23  6:44 AM   Result Value Ref Range    Glucose 138 (H) 70 - 99 mg/dL    Sodium 140 136 - 145 mmol/L    Potassium 4.3 3.5 - 5.1 mmol/L    Chloride 110 98 - 112 mmol/L    CO2 24.0 21.0 - 32.0 mmol/L    Anion Gap 6 0 - 18 mmol/L    BUN 21 (H) 7 - 18 mg/dL    Creatinine 0.87 0.70 - 1.30 mg/dL    BUN/CREA Ratio 24.1 (H) 10.0 - 20.0    Calcium, Total 9.2 8.5 - 10.1 mg/dL    Calculated Osmolality 295 275 - 295 mOsm/kg    eGFR-Cr 85 >=60 mL/min/1.73m2    ALT 19 16 - 61 U/L    AST 9 (L) 15 - 37 U/L    Alkaline Phosphatase 82 45 - 117 U/L    Bilirubin, Total 0.3 0.1 - 2.0 mg/dL    Total Protein 6.8 6.4 - 8.2 g/dL    Albumin 3.1 (L) 3.4 - 5.0 g/dL    Globulin  3.7 2.8 - 4.4 g/dL    A/G Ratio 0.8 (L) 1.0 - 2.0    Patient Fasting for CMP? Yes      *Note: Due to a large number of results and/or encounters for the requested time period, some results have not been displayed. A complete set of results can be found in Results Review.                  Passed - In person appointment or virtual visit in the past 6 months     Recent Outpatient Visits              1 month ago Essential hypertension    [address], 602 [facility], Kalpesh Polanco MD    Office Visit    4 months ago Type 2 diabetes mellitus without complication, without long-term current use of insulin [facility])    [address], 702 Tameka Suggs MD    Office Visit    6 months ago Anemia, unspecified type    [facility], [address] [facility] Collin Hdz Lorne Jordan, MD    Office Visit    8 months ago Type 2 diabetes mellitus without complication, without long-term current use of insulin [facility])    [address], 7[address] [facility],[address], Kalpesh Polanco MD    Office Visit    11 months ago Type 2 diabetes mellitus without complication, without long-term current use of insulin ([facility]    Deryl President, Tameka Gamboa MD    Office Visit          Future Appointments         Provider Department Appt Notes    In 1 week Nakia Mann MD [address], 602 [facility] FOLLOW UP DM  POLICY INF *BA  need to sched [place] Visit Passed - EGFRCR or GFRNAA > 50     GFR Evaluation  EGFRCR: 85 , resulted on 4/12/2023                 Recent Outpatient Visits              1 month ago Essential hypertension    [facility], 602 [place] Tricia Casiano MD    Office Visit    4 months ago Type 2 diabetes mellitus without complication, without long-term current use of insulin [facility])    [address], Cherylene Rotter, Lidia Beavers, MD    Office Visit    6 months ago Anemia, unspecified type    Collin Merlos Ramsey Jarred, MD    Office Visit    8 months ago Type 2 diabetes mellitus without complication, without long-term current use of insulin [facility])    [address], [address] [facility],[address], Brian Polanco MD    Office Visit    11 months ago Type 2 diabetes mellitus without complication, without long-term current use of insulin ([facility]    Tricia Olguin MD    Office Visit            Future Appointments         Provider Department Appt Notes    In 1 week Silver Morin MD [address], 602 [facility] FOLLOW UP DM  POLICY INF *BA  need to sched [place] Visit

## 2023-04-28 RX ORDER — FUROSEMIDE 20 MG/1
20 TABLET ORAL DAILY
Qty: 90 TABLET | Refills: 0 | Status: SHIPPED | OUTPATIENT
Start: 2023-04-28

## 2023-04-28 NOTE — TELEPHONE ENCOUNTER
Dr Lenard Woodward for [facility]: patient called for refill furosemide. He is on his last pill today. Are you able to refill? Has appt with PCP 5/8/23.

## 2023-05-08 ENCOUNTER — OFFICE VISIT (OUTPATIENT)
Facility: CLINIC | Age: 88
End: 2023-05-08

## 2023-05-08 VITALS
HEIGHT: 60 IN | OXYGEN SATURATION: 97 % | BODY MASS INDEX: 22.38 KG/M2 | SYSTOLIC BLOOD PRESSURE: 126 MMHG | WEIGHT: 114 LBS | DIASTOLIC BLOOD PRESSURE: 60 MMHG | HEART RATE: 66 BPM

## 2023-05-08 DIAGNOSIS — E11.9 TYPE 2 DIABETES MELLITUS WITHOUT COMPLICATION, WITHOUT LONG-TERM CURRENT USE OF INSULIN (HCC): Primary | ICD-10-CM

## 2023-05-08 DIAGNOSIS — I10 ESSENTIAL HYPERTENSION: ICD-10-CM

## 2023-05-08 DIAGNOSIS — E61.1 IRON DEFICIENCY: ICD-10-CM

## 2023-05-08 PROCEDURE — 3074F SYST BP LT 130 MM HG: CPT | Performed by: INTERNAL MEDICINE

## 2023-05-08 PROCEDURE — 3078F DIAST BP <80 MM HG: CPT | Performed by: INTERNAL MEDICINE

## 2023-05-08 PROCEDURE — 99214 OFFICE O/P EST MOD 30 MIN: CPT | Performed by: INTERNAL MEDICINE

## 2023-05-08 PROCEDURE — 1159F MED LIST DOCD IN RCRD: CPT | Performed by: INTERNAL MEDICINE

## 2023-05-08 PROCEDURE — 3008F BODY MASS INDEX DOCD: CPT | Performed by: INTERNAL MEDICINE

## 2023-05-08 PROCEDURE — 1160F RVW MEDS BY RX/DR IN RCRD: CPT | Performed by: INTERNAL MEDICINE

## 2023-05-08 RX ORDER — TERAZOSIN 2 MG/1
2 CAPSULE ORAL NIGHTLY
Qty: 90 CAPSULE | Refills: 1 | Status: SHIPPED | OUTPATIENT
Start: 2023-05-08

## 2023-05-08 RX ORDER — GLIPIZIDE AND METFORMIN HCL 2.5; 5 MG/1; MG/1
1 TABLET, FILM COATED ORAL 2 TIMES DAILY WITH MEALS
Qty: 180 TABLET | Refills: 1 | Status: SHIPPED | OUTPATIENT
Start: 2023-05-08

## 2023-05-18 RX ORDER — AMLODIPINE BESYLATE 5 MG/1
5 TABLET ORAL DAILY
Qty: 90 TABLET | Refills: 3 | Status: SHIPPED | OUTPATIENT
Start: 2023-05-18

## 2023-05-18 RX ORDER — DOCUSATE SODIUM 50 MG AND SENNOSIDES 8.6 MG 8.6; 5 MG/1; MG/1
1 TABLET, FILM COATED ORAL DAILY
Qty: 90 TABLET | Refills: 3 | Status: SHIPPED | OUTPATIENT
Start: 2023-05-18

## 2023-05-18 NOTE — TELEPHONE ENCOUNTER
Patient is calling and requesting a refill on the following medication.  Patient states that he is out of both medications    STIMULANT LAXATIVE 8.6-50 MG Oral Tab    amLODIPine 5 MG Oral Tab

## 2023-05-18 NOTE — TELEPHONE ENCOUNTER
Refill passed per [place] [employer], [facility] protocol. Requested Prescriptions   Pending Prescriptions Disp Refills    STIMULANT LAXATIVE 8.6-50 MG Oral Tab 90 tablet 1     Sig: Take 1 tablet by mouth daily. Gastrointestional Medication Protocol Passed - 5/18/2023 11:50 AM        Passed - In person appointment or virtual visit in the past 12 mos or appointment in next 3 mos     Recent Outpatient Visits              1 week ago Type 2 diabetes mellitus without complication, without long-term current use of insulin ([address]    Angelita Mckinney MD    Office Visit    2 months ago Essential hypertension    Panda Murcia, [address] [facility] [place]    Office Visit    5 months ago Type 2 diabetes mellitus without complication, without long-term current use of insulin [facility])    [address] [facility], Cele Paulino MD    Office Visit    6 months ago Anemia, unspecified type    [address] [facility] [place]    Office Visit    9 months ago Type 2 diabetes mellitus without complication, without long-term current use of insulin ([address]    Panda Murcia, [address], Cele Paulino MD    Office Visit          Future Appointments         Provider Department Appt Notes    In 2 months MD Panda Chavarria, [address] [facility] Return in about 3 months (around 8/8/2023) for Yearly Medicare Physical.                 amLODIPine 5 MG Oral Tab 90 tablet 1     Sig: Take 1 tablet (5 mg total) by mouth daily.        Hypertensive Medications Protocol Passed - 5/18/2023 11:50 AM        Passed - In person appointment in the past 12 or next 3 months     Recent Outpatient Visits              1 week ago Type 2 diabetes mellitus without complication, without long-term current use of insulin ([address]    [employer] Collin Og Dairl Coho, MD    Office Visit    2 months ago Essential hypertension    [facility] [address] [facility], Tara Polanco MD    Office Visit    5 months ago Type 2 diabetes mellitus without complication, without long-term current use of insulin [facility])    Collin Holguin Dairl Coho, MD    Office Visit    6 months ago Anemia, unspecified type    Collin Holguin Dairl Coho, MD    Office Visit    9 months ago Type 2 diabetes mellitus without complication, without long-term current use of insulin ([facility]    [address] [place], [address] [facility] [address], Tara Polanco MD    Office Visit          Future Appointments         Provider Department Appt Notes    In 2 months [facility] [place] [address] [facility] Return in about 3 months (around 8/8/2023) for Yearly Medicare Physical.               Passed - Last BP reading less than 140/90     BP Readings from Last 1 Encounters:  05/08/23 : 126/60                Passed - CMP or BMP in past 6 months     Recent Results (from the past 4392 hour(s))   COMP METABOLIC PANEL (14)    Collection Time: 04/12/23  6:44 AM   Result Value Ref Range    Glucose 138 (H) 70 - 99 mg/dL    Sodium 140 136 - 145 mmol/L    Potassium 4.3 3.5 - 5.1 mmol/L    Chloride 110 98 - 112 mmol/L    CO2 24.0 21.0 - 32.0 mmol/L    Anion Gap 6 0 - 18 mmol/L    BUN 21 (H) 7 - 18 mg/dL    Creatinine 0.87 0.70 - 1.30 mg/dL    BUN/CREA Ratio 24.1 (H) 10.0 - 20.0    Calcium, Total 9.2 8.5 - 10.1 mg/dL    Calculated Osmolality 295 275 - 295 mOsm/kg    eGFR-Cr 85 >=60 mL/min/1.73m2    ALT 19 16 - 61 U/L    AST 9 (L) 15 - 37 U/L    Alkaline Phosphatase 82 45 - 117 U/L    Bilirubin, Total 0.3 0.1 - 2.0 mg/dL    Total Protein 6.8 6.4 - 8.2 g/dL    Albumin 3.1 (L) 3.4 - 5.0 g/dL    Globulin  3.7 2.8 - 4.4 g/dL    A/G Ratio 0.8 (L) 1.0 - 2.0    Patient Fasting for CMP? Yes      *Note: Due to a large number of results and/or encounters for the requested time period, some results have not been displayed. A complete set of results can be found in Results Review.                  Passed - In person appointment or virtual visit in the past 6 months     Recent Outpatient Visits              1 week ago Type 2 diabetes mellitus without complication, without long-term current use of insulin ([address]    Kerry Rudd MD    Office Visit    2 months ago Essential hypertension    Tatyana Welch, 602 [facility], Mala Polanco MD    Office Visit    5 months ago Type 2 diabetes mellitus without complication, without long-term current use of insulin [facility])    Joseph Arroyo, Samira Seth MD    Office Visit    6 months ago Anemia, unspecified type    Collin Strange, Mala Doll MD    Office Visit    9 months ago Type 2 diabetes mellitus without complication, without long-term current use of insulin ([address]    Tatyana Welch, [address] [facility],[address], Unknown MD Rool    Office Visit          Future Appointments         Provider Department Appt Notes    In 2 months MD Tatyana Espinoza, [address] Return in about 3 months (around 8/8/2023) for Yearly Medicare Physical.               Passed - EGFRCR or GFRNAA > 50     GFR Evaluation  EGFRCR: 85 , resulted on 4/12/2023                  Future Appointments         Provider Department Appt Notes    In 2 months MD Tatyana Espinoza, [address] Return in about 3 months (around 8/8/2023) for Yearly Medicare Physical.            Recent Outpatient Visits              1 week ago Type 2 diabetes mellitus without complication, without long-term current use of insulin [facility])    Valerie Miller MD    Office Visit    2 months ago Essential hypertension    [facility], 602 [facility], Ritesh Polanco MD    Office Visit    5 months ago Type 2 diabetes mellitus without complication, without long-term current use of insulin [facility])    Florinda Slot, Dennis Lesch, Daniela Estrada MD    Office Visit    6 months ago Anemia, unspecified type    Collin Angel Darris Smart, MD    Office Visit    9 months ago Type 2 diabetes mellitus without complication, without long-term current use of insulin [facility])    [address] [facility], Daniela Estrada MD    Office Visit

## 2023-05-30 ENCOUNTER — NURSE TRIAGE (OUTPATIENT)
Dept: INTERNAL MEDICINE CLINIC | Facility: CLINIC | Age: 88
End: 2023-05-30

## 2023-05-30 NOTE — TELEPHONE ENCOUNTER
Pt was called and informed of Dr. Teresa Krueger message below and he verbalized understanding. Inform pt if his pain because 10/10 then he should go to ER. Pt verbalized understanding.

## 2023-06-01 ENCOUNTER — HOSPITAL ENCOUNTER (EMERGENCY)
Facility: HOSPITAL | Age: 88
Discharge: HOME | End: 2023-06-01
Attending: STUDENT IN AN ORGANIZED HEALTH CARE EDUCATION/TRAINING PROGRAM
Payer: MEDICARE

## 2023-06-01 VITALS
DIASTOLIC BLOOD PRESSURE: 58 MMHG | HEART RATE: 53 BPM | TEMPERATURE: 98 F | SYSTOLIC BLOOD PRESSURE: 156 MMHG | BODY MASS INDEX: 21.6 KG/M2 | OXYGEN SATURATION: 99 % | HEIGHT: 60 IN | RESPIRATION RATE: 20 BRPM | WEIGHT: 110 LBS

## 2023-06-01 DIAGNOSIS — G57.11 MERALGIA PARAESTHETICA, RIGHT: Primary | ICD-10-CM

## 2023-06-01 LAB
ALBUMIN SERPL-MCNC: 3.4 G/DL (ref 3.4–5)
ALBUMIN/GLOB SERPL: 1 {RATIO} (ref 1–2)
ALP LIVER SERPL-CCNC: 73 U/L
ALT SERPL-CCNC: 20 U/L
ANION GAP SERPL CALC-SCNC: 9 MMOL/L (ref 0–18)
AST SERPL-CCNC: 13 U/L (ref 15–37)
BASOPHILS # BLD AUTO: 0.05 X10(3) UL (ref 0–0.2)
BASOPHILS NFR BLD AUTO: 0.6 %
BILIRUB SERPL-MCNC: 0.3 MG/DL (ref 0.1–2)
BILIRUB UR QL: NEGATIVE
BUN BLD-MCNC: 26 MG/DL (ref 7–18)
BUN/CREAT SERPL: 24.8 (ref 10–20)
CALCIUM BLD-MCNC: 9.4 MG/DL (ref 8.5–10.1)
CHLORIDE SERPL-SCNC: 109 MMOL/L (ref 98–112)
CLARITY UR: CLEAR
CO2 SERPL-SCNC: 25 MMOL/L (ref 21–32)
COLOR UR: COLORLESS
CREAT BLD-MCNC: 1.05 MG/DL
DEPRECATED RDW RBC AUTO: 43.1 FL (ref 35.1–46.3)
EOSINOPHIL # BLD AUTO: 0.88 X10(3) UL (ref 0–0.7)
EOSINOPHIL NFR BLD AUTO: 10 %
ERYTHROCYTE [DISTWIDTH] IN BLOOD BY AUTOMATED COUNT: 14.7 % (ref 11–15)
GFR SERPLBLD BASED ON 1.73 SQ M-ARVRAT: 69 ML/MIN/1.73M2 (ref 60–?)
GLOBULIN PLAS-MCNC: 3.5 G/DL (ref 2.8–4.4)
GLUCOSE BLD-MCNC: 122 MG/DL (ref 70–99)
GLUCOSE UR-MCNC: NORMAL MG/DL
HCT VFR BLD AUTO: 34.9 %
HGB BLD-MCNC: 10.8 G/DL
HGB UR QL STRIP.AUTO: NEGATIVE
IMM GRANULOCYTES # BLD AUTO: 0.02 X10(3) UL (ref 0–1)
IMM GRANULOCYTES NFR BLD: 0.2 %
KETONES UR-MCNC: NEGATIVE MG/DL
LEUKOCYTE ESTERASE UR QL STRIP.AUTO: NEGATIVE
LYMPHOCYTES # BLD AUTO: 1.49 X10(3) UL (ref 1–4)
LYMPHOCYTES NFR BLD AUTO: 16.9 %
MCH RBC QN AUTO: 25.4 PG (ref 26–34)
MCHC RBC AUTO-ENTMCNC: 30.9 G/DL (ref 31–37)
MCV RBC AUTO: 81.9 FL
MONOCYTES # BLD AUTO: 0.78 X10(3) UL (ref 0.1–1)
MONOCYTES NFR BLD AUTO: 8.9 %
NEUTROPHILS # BLD AUTO: 5.58 X10 (3) UL (ref 1.5–7.7)
NEUTROPHILS # BLD AUTO: 5.58 X10(3) UL (ref 1.5–7.7)
NEUTROPHILS NFR BLD AUTO: 63.4 %
NITRITE UR QL STRIP.AUTO: NEGATIVE
OSMOLALITY SERPL CALC.SUM OF ELEC: 302 MOSM/KG (ref 275–295)
PH UR: 6 [PH] (ref 5–8)
PLATELET # BLD AUTO: 220 10(3)UL (ref 150–450)
POTASSIUM SERPL-SCNC: 4 MMOL/L (ref 3.5–5.1)
PROT SERPL-MCNC: 6.9 G/DL (ref 6.4–8.2)
PROT UR-MCNC: 70 MG/DL
RBC # BLD AUTO: 4.26 X10(6)UL
SODIUM SERPL-SCNC: 143 MMOL/L (ref 136–145)
SP GR UR STRIP: 1.01 (ref 1–1.03)
UROBILINOGEN UR STRIP-ACNC: NORMAL
WBC # BLD AUTO: 8.8 X10(3) UL (ref 4–11)

## 2023-06-01 PROCEDURE — 99284 EMERGENCY DEPT VISIT MOD MDM: CPT

## 2023-06-01 PROCEDURE — 99283 EMERGENCY DEPT VISIT LOW MDM: CPT

## 2023-06-01 PROCEDURE — 81001 URINALYSIS AUTO W/SCOPE: CPT | Performed by: STUDENT IN AN ORGANIZED HEALTH CARE EDUCATION/TRAINING PROGRAM

## 2023-06-01 PROCEDURE — 36415 COLL VENOUS BLD VENIPUNCTURE: CPT

## 2023-06-01 PROCEDURE — 80053 COMPREHEN METABOLIC PANEL: CPT

## 2023-06-01 PROCEDURE — 85025 COMPLETE CBC W/AUTO DIFF WBC: CPT

## 2023-06-01 PROCEDURE — 85025 COMPLETE CBC W/AUTO DIFF WBC: CPT | Performed by: STUDENT IN AN ORGANIZED HEALTH CARE EDUCATION/TRAINING PROGRAM

## 2023-06-01 PROCEDURE — 80053 COMPREHEN METABOLIC PANEL: CPT | Performed by: STUDENT IN AN ORGANIZED HEALTH CARE EDUCATION/TRAINING PROGRAM

## 2023-06-01 RX ORDER — CYCLOBENZAPRINE HCL 5 MG
5 TABLET ORAL 3 TIMES DAILY PRN
Qty: 15 TABLET | Refills: 0 | Status: SHIPPED | OUTPATIENT
Start: 2023-06-01 | End: 2023-06-06

## 2023-06-01 RX ORDER — CYCLOBENZAPRINE HCL 5 MG
5 TABLET ORAL ONCE
Status: COMPLETED | OUTPATIENT
Start: 2023-06-01 | End: 2023-06-01

## 2023-06-01 RX ORDER — ACETAMINOPHEN 500 MG
1000 TABLET ORAL ONCE
Status: COMPLETED | OUTPATIENT
Start: 2023-06-01 | End: 2023-06-01

## 2023-06-01 RX ORDER — LIDOCAINE 50 MG/G
1 PATCH TOPICAL EVERY 24 HOURS
Qty: 7 PATCH | Refills: 0 | Status: SHIPPED | OUTPATIENT
Start: 2023-06-01 | End: 2023-06-08

## 2023-06-02 ENCOUNTER — PATIENT OUTREACH (OUTPATIENT)
Dept: CASE MANAGEMENT | Age: 88
End: 2023-06-02

## 2023-06-02 NOTE — DISCHARGE INSTRUCTIONS
.Thank you for seeking care at [facility] Emergency Department. You have been seen and evaluated for back pain. We discussed the results of your workup   Please read the instructions provided   If given prescriptions, take as instructed    Remember, your care process does not end after your visit today. Please follow-up with your doctor within 1-2 days for a follow-up check to ensure you are  improving, to see if you need any further evaluation/testing, or to evaluate for any alternate diagnoses. Please return to the emergency department if you develop severe pain that is not controlled by pain medications, loss of control of your legs, if you are unable to walk because of pain or weakness, worsening numbness or weakness, loss of bowel or bladder control (dribbling of urine or having accidents you wouldn't normally have), inability to urinate, numbness of your genital or anal area, fevers, abdominal pain, change in urination, or as these could all be signs of a serious medical emergency. Call or return to the ER for any other new or worsening symptoms.

## 2023-06-02 NOTE — PROGRESS NOTES
1st attempt ED f/up apt request  PCP -decline, pt doesn't feel he needs to make apt at this time  Closing encounter

## 2023-06-13 ENCOUNTER — NURSE TRIAGE (OUTPATIENT)
Dept: INTERNAL MEDICINE CLINIC | Facility: CLINIC | Age: 88
End: 2023-06-13

## 2023-06-14 NOTE — TELEPHONE ENCOUNTER
Dr. Liz Valenzuela for Dr. Kasia Short  I called pt to f/u.see message below from yesterday. Pt stated that he still has the right thigh pain but its a little less then yesterday. Pt did take tylenol and applied the little gel he had left from the ER which is Diclofenac 1 %. Pt is still requesting a refill for this. Please advise.

## 2023-06-14 NOTE — TELEPHONE ENCOUNTER
I will refill medication Voltaren gel for him, but he needs to be seen by any available provider in the office within next few days. And not to wait f appointment in August with PCP. Addressing issue in the doctor's office always more beneficial when going to the emergency room. Nurse practitioners available to see him or any internal medicine provider hopefully this week.

## 2023-06-14 NOTE — TELEPHONE ENCOUNTER
Patient calling, confirmed name and . Advised. Assisted to schedule:   Future Appointments   Date Time Provider Oksana Lozano   6/15/2023  1:00 PM TEE Mendes   2023  3:00 PM Sakshi Jaime MD [facility]

## 2023-06-15 ENCOUNTER — OFFICE VISIT (OUTPATIENT)
Dept: INTERNAL MEDICINE CLINIC | Facility: CLINIC | Age: 88
End: 2023-06-15

## 2023-06-15 VITALS
SYSTOLIC BLOOD PRESSURE: 138 MMHG | HEIGHT: 60 IN | WEIGHT: 111 LBS | BODY MASS INDEX: 21.79 KG/M2 | HEART RATE: 70 BPM | DIASTOLIC BLOOD PRESSURE: 78 MMHG

## 2023-06-15 DIAGNOSIS — M79.604 PAIN OF RIGHT LOWER EXTREMITY: Primary | ICD-10-CM

## 2023-06-15 PROCEDURE — 3078F DIAST BP <80 MM HG: CPT | Performed by: NURSE PRACTITIONER

## 2023-06-15 PROCEDURE — 3075F SYST BP GE 130 - 139MM HG: CPT | Performed by: NURSE PRACTITIONER

## 2023-06-15 PROCEDURE — 1159F MED LIST DOCD IN RCRD: CPT | Performed by: NURSE PRACTITIONER

## 2023-06-15 PROCEDURE — 3008F BODY MASS INDEX DOCD: CPT | Performed by: NURSE PRACTITIONER

## 2023-06-15 PROCEDURE — 1160F RVW MEDS BY RX/DR IN RCRD: CPT | Performed by: NURSE PRACTITIONER

## 2023-06-15 PROCEDURE — 99214 OFFICE O/P EST MOD 30 MIN: CPT | Performed by: NURSE PRACTITIONER

## 2023-06-15 RX ORDER — GABAPENTIN 300 MG/1
300 CAPSULE ORAL NIGHTLY
Qty: 30 CAPSULE | Refills: 1 | Status: SHIPPED | OUTPATIENT
Start: 2023-06-15

## 2023-06-15 NOTE — ASSESSMENT & PLAN NOTE
Right posterior leg pain that runs down the back of his leg. HE was given flexeril and lidocaine patch and instructed to take tylenol. Pain was better but know has worsen again. Plan  Will Try-   gabapentin 300 MG Oral Cap Take 1 capsule (300 mg total) by mouth nightly.  30 capsule   Follow up with Dr. Sage Stout

## 2023-06-18 ENCOUNTER — HOSPITAL ENCOUNTER (EMERGENCY)
Facility: HOSPITAL | Age: 88
Discharge: HOME | End: 2023-06-18
Attending: EMERGENCY MEDICINE
Payer: MEDICARE

## 2023-06-18 ENCOUNTER — APPOINTMENT (OUTPATIENT)
Dept: ULTRASOUND IMAGING | Facility: HOSPITAL | Age: 88
End: 2023-06-18
Attending: EMERGENCY MEDICINE
Payer: MEDICARE

## 2023-06-18 ENCOUNTER — APPOINTMENT (OUTPATIENT)
Dept: GENERAL RADIOLOGY | Facility: HOSPITAL | Age: 88
End: 2023-06-18
Attending: EMERGENCY MEDICINE
Payer: MEDICARE

## 2023-06-18 VITALS
RESPIRATION RATE: 18 BRPM | DIASTOLIC BLOOD PRESSURE: 72 MMHG | SYSTOLIC BLOOD PRESSURE: 165 MMHG | WEIGHT: 110.88 LBS | OXYGEN SATURATION: 96 % | HEART RATE: 74 BPM | BODY MASS INDEX: 23 KG/M2 | TEMPERATURE: 98 F

## 2023-06-18 DIAGNOSIS — M79.604 PAIN OF RIGHT LOWER EXTREMITY: Primary | ICD-10-CM

## 2023-06-18 PROCEDURE — 73552 X-RAY EXAM OF FEMUR 2/>: CPT | Performed by: EMERGENCY MEDICINE

## 2023-06-18 PROCEDURE — 99284 EMERGENCY DEPT VISIT MOD MDM: CPT

## 2023-06-18 PROCEDURE — 93971 EXTREMITY STUDY: CPT | Performed by: EMERGENCY MEDICINE

## 2023-06-18 RX ORDER — CYCLOBENZAPRINE HCL 10 MG
10 TABLET ORAL ONCE
Status: COMPLETED | OUTPATIENT
Start: 2023-06-18 | End: 2023-06-18

## 2023-06-18 RX ORDER — ACETAMINOPHEN AND CODEINE PHOSPHATE 300; 30 MG/1; MG/1
1 TABLET ORAL EVERY 6 HOURS PRN
Qty: 10 TABLET | Refills: 0 | Status: SHIPPED | OUTPATIENT
Start: 2023-06-18 | End: 2023-06-23

## 2023-06-18 RX ORDER — CYCLOBENZAPRINE HCL 10 MG
10 TABLET ORAL 3 TIMES DAILY PRN
Qty: 20 TABLET | Refills: 0 | Status: SHIPPED | OUTPATIENT
Start: 2023-06-18 | End: 2023-06-25

## 2023-06-18 RX ORDER — ACETAMINOPHEN AND CODEINE PHOSPHATE 300; 30 MG/1; MG/1
1 TABLET ORAL ONCE
Status: COMPLETED | OUTPATIENT
Start: 2023-06-18 | End: 2023-06-18

## 2023-06-18 NOTE — ED INITIAL ASSESSMENT (HPI)
PT c/o right thigh pain. STates he had similar symptoms recently starting with his lower back, no longer has lower back pain. Atraumatic.

## 2023-06-19 ENCOUNTER — PATIENT OUTREACH (OUTPATIENT)
Dept: CASE MANAGEMENT | Age: 88
End: 2023-06-19

## 2023-06-19 NOTE — PROGRESS NOTES
1st attempt ED f/up apt request    PCP -decline, pt stated he will schedule if the meds he was given doesn't work  [place] -decline pt stated he will schedule if the meds he was given doesn't work  Closing encounter

## 2023-06-20 RX ORDER — GLUCOSAM/CHON-MSM1/C/MANG/BOSW 500-416.6
TABLET ORAL
Qty: 200 EACH | Refills: 3 | Status: SHIPPED | OUTPATIENT
Start: 2023-06-20

## 2023-06-20 NOTE — TELEPHONE ENCOUNTER
Refill passed per [place] [employer], [facility] protocol.     Requested Prescriptions   Pending Prescriptions Disp Refills    TRUEplus Lancets 33G Does not apply Misc 200 each 3     Sig: Use to check glucose twice a day       Diabetic Supplies Protocol Passed - 6/20/2023 12:05 PM        Passed - In person appointment or virtual visit in the past 12 mos or appointment in next 3 mos     Recent Outpatient Visits              5 days ago Pain of right lower extremity    [facility], [facility], APRN    Office Visit    1 month ago Type 2 diabetes mellitus without complication, without long-term current use of insulin [facility])    [address], [address] [facility] [place], [place]    Office Visit    3 months ago Essential hypertension    [facility], [address] [facility] [place], Fela Polanco MD    Office Visit    6 months ago Type 2 diabetes mellitus without complication, without long-term current use of insulin [facility])    Marlene Camargo MD    Office Visit    8 months ago Anemia, unspecified type    Marlene Camargo MD    Office Visit          Future Appointments         Provider Department Appt Notes    In 1 month Leticia Gutierrez MD [address], [facility] Return in about 3 months (around 8/8/2023) for Yearly Medicare Physical.                  Recent Outpatient Visits              5 days ago Pain of right lower extremity    [facility], [facility] Felisa, APRN    Office Visit    1 month ago Type 2 diabetes mellitus without complication, without long-term current use of insulin [facility])    Collin Olsen Odetta Cool, MD    Office Visit    3 months ago Essential hypertension    [address], [address] [facility] [place], Marlene Gonzalez MD    Office Visit    6 months ago Type 2 diabetes mellitus without complication, without long-term current use of insulin [facility])    Marlene Camargo MD    Office Visit    8 months ago Anemia, unspecified type    aMrlene Camargo MD    Office Visit          Future Appointments         Provider Department Appt Notes    In 1 month Leticia Gutierrez MD [address] [facility] [address], [facility] 84 Return in about 3 months (around 8/8/2023) for Yearly Medicare Physical.

## 2023-06-20 NOTE — TELEPHONE ENCOUNTER
Patient called to refill test strips, only has a few more    TRUEplus Lancets 33G Does not apply Misc

## 2023-06-22 ENCOUNTER — OFFICE VISIT (OUTPATIENT)
Dept: INTERNAL MEDICINE CLINIC | Facility: CLINIC | Age: 88
End: 2023-06-22

## 2023-06-22 VITALS
WEIGHT: 110 LBS | HEART RATE: 74 BPM | BODY MASS INDEX: 21.6 KG/M2 | DIASTOLIC BLOOD PRESSURE: 50 MMHG | HEIGHT: 60 IN | SYSTOLIC BLOOD PRESSURE: 120 MMHG

## 2023-06-22 DIAGNOSIS — M79.604 PAIN OF RIGHT LOWER EXTREMITY: Primary | ICD-10-CM

## 2023-06-22 PROCEDURE — 99214 OFFICE O/P EST MOD 30 MIN: CPT | Performed by: NURSE PRACTITIONER

## 2023-06-22 PROCEDURE — 3008F BODY MASS INDEX DOCD: CPT | Performed by: NURSE PRACTITIONER

## 2023-06-22 PROCEDURE — 3078F DIAST BP <80 MM HG: CPT | Performed by: NURSE PRACTITIONER

## 2023-06-22 PROCEDURE — 1159F MED LIST DOCD IN RCRD: CPT | Performed by: NURSE PRACTITIONER

## 2023-06-22 PROCEDURE — 3074F SYST BP LT 130 MM HG: CPT | Performed by: NURSE PRACTITIONER

## 2023-06-22 RX ORDER — ACETAMINOPHEN AND CODEINE PHOSPHATE 300; 30 MG/1; MG/1
1 TABLET ORAL EVERY 4 HOURS PRN
Qty: 30 TABLET | Refills: 0 | Status: SHIPPED | OUTPATIENT
Start: 2023-06-22

## 2023-06-22 NOTE — ASSESSMENT & PLAN NOTE
I saw patient for right extremity lower leg pain 7 days ago. I gave him gabapentin and he stated it made him feel drunk. He then went to the ED and his complaint was worked up with x-rays and ultrasound to rule out DVT. No acute processes were identified. Plan refill  Is Tylenol 3 for now but discussed adverse reactions from the medication such as constipation and possibly falling. Patient will use this sparingly and only when needed. Patient will continue the Flexeril.   Refer patient to physiatry for further evaluation and pain management

## 2023-07-18 ENCOUNTER — OFFICE VISIT (OUTPATIENT)
Dept: PHYSICAL MEDICINE AND REHAB | Facility: CLINIC | Age: 88
End: 2023-07-18
Payer: MEDICARE

## 2023-07-18 ENCOUNTER — HOSPITAL ENCOUNTER (OUTPATIENT)
Dept: GENERAL RADIOLOGY | Facility: HOSPITAL | Age: 88
Discharge: HOME | End: 2023-07-18
Attending: PHYSICAL MEDICINE & REHABILITATION
Payer: MEDICARE

## 2023-07-18 VITALS
DIASTOLIC BLOOD PRESSURE: 72 MMHG | BODY MASS INDEX: 21.6 KG/M2 | OXYGEN SATURATION: 97 % | HEART RATE: 61 BPM | SYSTOLIC BLOOD PRESSURE: 114 MMHG | HEIGHT: 60 IN | WEIGHT: 110 LBS

## 2023-07-18 DIAGNOSIS — M54.16 RIGHT LUMBAR RADICULOPATHY: Primary | ICD-10-CM

## 2023-07-18 DIAGNOSIS — M54.16 RIGHT LUMBAR RADICULOPATHY: ICD-10-CM

## 2023-07-18 PROCEDURE — 1125F AMNT PAIN NOTED PAIN PRSNT: CPT | Performed by: PHYSICAL MEDICINE & REHABILITATION

## 2023-07-18 PROCEDURE — 99204 OFFICE O/P NEW MOD 45 MIN: CPT | Performed by: PHYSICAL MEDICINE & REHABILITATION

## 2023-07-18 PROCEDURE — 72114 X-RAY EXAM L-S SPINE BENDING: CPT | Performed by: PHYSICAL MEDICINE & REHABILITATION

## 2023-07-18 RX ORDER — GABAPENTIN 100 MG/1
100 CAPSULE ORAL 3 TIMES DAILY
Qty: 90 CAPSULE | Refills: 0 | Status: SHIPPED | OUTPATIENT
Start: 2023-07-18

## 2023-07-18 NOTE — PATIENT INSTRUCTIONS
-Start PT and home exercises  -Start Gabapentin 100mg at nighttime and slowly increased to three times daily  -Follow up in 4 weeks  -Xray of the lumbar spine   -If no better, will get MRI

## 2023-07-19 RX ORDER — ALLOPURINOL 300 MG/1
300 TABLET ORAL DAILY
Qty: 90 TABLET | Refills: 1 | Status: SHIPPED | OUTPATIENT
Start: 2023-07-19

## 2023-07-19 NOTE — TELEPHONE ENCOUNTER
Please review. Protocol failed / No Protocol. Requested Prescriptions   Pending Prescriptions Disp Refills    allopurinol 300 MG Oral Tab 90 tablet 1     Sig: Take 1 tablet (300 mg total) by mouth daily.        There is no refill protocol information for this order

## 2023-08-14 ENCOUNTER — OFFICE VISIT (OUTPATIENT)
Facility: CLINIC | Age: 88
End: 2023-08-14

## 2023-08-14 VITALS
SYSTOLIC BLOOD PRESSURE: 154 MMHG | TEMPERATURE: 98 F | HEART RATE: 66 BPM | WEIGHT: 110 LBS | RESPIRATION RATE: 18 BRPM | HEIGHT: 60 IN | DIASTOLIC BLOOD PRESSURE: 60 MMHG | BODY MASS INDEX: 21.6 KG/M2

## 2023-08-14 DIAGNOSIS — I65.23 BILATERAL CAROTID ARTERY STENOSIS: ICD-10-CM

## 2023-08-14 DIAGNOSIS — I25.119 ATHEROSCLEROSIS OF NATIVE CORONARY ARTERY OF NATIVE HEART WITH ANGINA PECTORIS (HCC): ICD-10-CM

## 2023-08-14 DIAGNOSIS — K21.9 GASTROESOPHAGEAL REFLUX DISEASE, UNSPECIFIED WHETHER ESOPHAGITIS PRESENT: ICD-10-CM

## 2023-08-14 DIAGNOSIS — E61.1 IRON DEFICIENCY: ICD-10-CM

## 2023-08-14 DIAGNOSIS — G11.9 PRIMARY CEREBELLAR DEGENERATION (HCC): ICD-10-CM

## 2023-08-14 DIAGNOSIS — I51.89 DIASTOLIC DYSFUNCTION: ICD-10-CM

## 2023-08-14 DIAGNOSIS — I45.10 RBBB (RIGHT BUNDLE BRANCH BLOCK): ICD-10-CM

## 2023-08-14 DIAGNOSIS — Z00.00 ENCOUNTER FOR ANNUAL HEALTH EXAMINATION: Primary | ICD-10-CM

## 2023-08-14 DIAGNOSIS — E11.9 TYPE 2 DIABETES MELLITUS WITHOUT COMPLICATION, WITHOUT LONG-TERM CURRENT USE OF INSULIN (HCC): ICD-10-CM

## 2023-08-14 DIAGNOSIS — I10 ESSENTIAL HYPERTENSION: ICD-10-CM

## 2023-08-14 PROBLEM — M79.604 PAIN OF RIGHT LOWER EXTREMITY: Status: RESOLVED | Noted: 2023-06-15 | Resolved: 2023-08-14
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
## 2023-08-14 PROBLEM — L89.90 PRESSURE ULCER: Status: RESOLVED | Noted: 2022-02-08 | Resolved: 2023-08-14

## 2023-08-14 PROBLEM — I50.9 ACUTE CONGESTIVE HEART FAILURE, UNSPECIFIED HEART FAILURE TYPE (HCC): Status: RESOLVED | Noted: 2021-09-26 | Resolved: 2023-08-14

## 2023-08-14 PROBLEM — I50.9 ACUTE CONGESTIVE HEART FAILURE (HCC): Status: RESOLVED | Noted: 2021-09-26 | Resolved: 2023-08-14

## 2023-08-14 PROBLEM — E11.69 DIABETES MELLITUS ASSOCIATED WITH HORMONAL ETIOLOGY (HCC): Status: RESOLVED | Noted: 2021-05-10 | Resolved: 2023-08-14

## 2023-08-14 LAB
CARTRIDGE LOT#: 611 NUMERIC
HEMOGLOBIN A1C: 7.1 % (ref 4.3–5.6)

## 2023-08-17 ENCOUNTER — TELEPHONE (OUTPATIENT)
Dept: PHYSICAL THERAPY | Facility: HOSPITAL | Age: 88
End: 2023-08-17

## 2023-08-23 ENCOUNTER — TELEPHONE (OUTPATIENT)
Dept: INTERNAL MEDICINE CLINIC | Facility: CLINIC | Age: 88
End: 2023-08-23

## 2023-08-23 NOTE — TELEPHONE ENCOUNTER
Current Outpatient Medications:     LOSARTAN 100 MG Oral Tab, TAKE 1 TABLET(100 MG) BY MOUTH DAILY, Disp: 90 tablet, Rfl: 1

## 2023-08-23 NOTE — TELEPHONE ENCOUNTER
Per patient he needs refill on his Losartan / Hydrochlorothiazide 100mg but not in his current med list, per patient he is out of med.

## 2023-08-24 ENCOUNTER — TELEPHONE (OUTPATIENT)
Dept: PHYSICAL THERAPY | Facility: HOSPITAL | Age: 88
End: 2023-08-24

## 2023-08-24 RX ORDER — LOSARTAN POTASSIUM 100 MG/1
100 TABLET ORAL DAILY
Qty: 90 TABLET | Refills: 1 | Status: SHIPPED | OUTPATIENT
Start: 2023-08-24 | End: 2023-08-24

## 2023-08-24 RX ORDER — LOSARTAN POTASSIUM 100 MG/1
100 TABLET ORAL DAILY
Qty: 90 TABLET | Refills: 1 | OUTPATIENT
Start: 2023-08-24

## 2023-08-24 RX ORDER — LOSARTAN POTASSIUM 100 MG/1
100 TABLET ORAL DAILY
Qty: 90 TABLET | Refills: 1 | Status: SHIPPED | OUTPATIENT
Start: 2023-08-24

## 2023-08-24 NOTE — TELEPHONE ENCOUNTER
Disp Refills Start End    losartan 100 MG Oral Tab 90 tablet 1 8/24/2023     Sig - Route:  Take 1 tablet (100 mg total) by mouth daily. - Oral    Sent to pharmacy as: Losartan Potassium 100 MG Oral Tablet (Cozaar)    E-Prescribing Status: Receipt confirmed by pharmacy (8/24/2023 11:28 AM CDT)      Pharmacy    [place] [address] [address], [address] [facility]  [address] [facility] AT [address] [place] [address], [phone], [phone]

## 2023-08-24 NOTE — TELEPHONE ENCOUNTER
Patient called. He took his last pill today for Rx: LOSARTAN 100 MG Oral Tab       He is requesting Dr Booker Loud send this medication refill in today to:  [address]

## 2023-08-24 NOTE — TELEPHONE ENCOUNTER
Please review. Protocol failed / Has no protocol. Requested Prescriptions   Pending Prescriptions Disp Refills    losartan 100 MG Oral Tab 90 tablet 1     Sig: Take 1 tablet (100 mg total) by mouth daily. Hypertensive Medications Protocol Failed - 8/23/2023  2:56 PM        Failed - Last BP reading less than 140/90     BP Readings from Last 1 Encounters:  08/14/23 : 154/60              Passed - In person appointment in the past 12 or next 3 months     Recent Outpatient Visits              1 week ago Encounter for annual health examination    Khalida Jenkins MD    Office Visit    1 month ago Right lumbar radiculopathy    [facility], [address], [place], [place]    Office Visit    2 months ago Pain of right lower extremity    [facility], [address] [facility] [place], APRVEDA    Office Visit    2 months ago Pain of right lower extremity    [facility], [address] [facility], [place], APRVEDA    Office Visit    3 months ago Type 2 diabetes mellitus without complication, without long-term current use of insulin [facility])    Kaycee Pollock, [address] [facility], Anatoly's MD kwesi    Office Visit          Future Appointments         Provider Department Appt Notes    In 5 days Jane Mcgee, [address] [place] [facility] send to ins after eval   [employer]   $20 c/p    In 2 weeks [place]ood, [address]? [employer]   $20 c/p    In 2 weeks [place] Sid[place]ood, [address]? [employer]   $20 c/p    In 3 weeks [place] Sidles, [address]? [employer]   $20 c/p    In 1 month [place] Arely, [address]? [employer]   $20 c/p    In 1 month [place], [address]?   [employer]   $20 c/p    In 1 month Jane Mcgee, [address] [place]? [employer]   $20 c/p    In 1 month Mireya Soto, [address] [place]? [employer]   $20 c/p    In 1 month Rui JoannaDO [address] 4 wk f/u    In 3 months Evelyn Dean MD [address], [facility] 84 Return in about 3 months (around 11/14/2023). Passed - CMP or BMP in past 6 months     Recent Results (from the past 4392 hour(s))   Comp Metabolic Panel (14)    Collection Time: 06/01/23  4:50 PM   Result Value Ref Range    Glucose 122 (H) 70 - 99 mg/dL    Sodium 143 136 - 145 mmol/L    Potassium 4.0 3.5 - 5.1 mmol/L    Chloride 109 98 - 112 mmol/L    CO2 25.0 21.0 - 32.0 mmol/L    Anion Gap 9 0 - 18 mmol/L    BUN 26 (H) 7 - 18 mg/dL    Creatinine 1.05 0.70 - 1.30 mg/dL    BUN/CREA Ratio 24.8 (H) 10.0 - 20.0    Calcium, Total 9.4 8.5 - 10.1 mg/dL    Calculated Osmolality 302 (H) 275 - 295 mOsm/kg    eGFR-Cr 69 >=60 mL/min/1.73m2    ALT 20 16 - 61 U/L    AST 13 (L) 15 - 37 U/L    Alkaline Phosphatase 73 45 - 117 U/L    Bilirubin, Total 0.3 0.1 - 2.0 mg/dL    Total Protein 6.9 6.4 - 8.2 g/dL    Albumin 3.4 3.4 - 5.0 g/dL    Globulin  3.5 2.8 - 4.4 g/dL    A/G Ratio 1.0 1.0 - 2.0     *Note: Due to a large number of results and/or encounters for the requested time period, some results have not been displayed. A complete set of results can be found in Results Review.                Passed - In person appointment or virtual visit in the past 6 months     Recent Outpatient Visits              1 week ago Encounter for annual health examination    Tana Donovan MD    Office Visit    1 month ago Right lumbar radiculopathy    [facility], [address], [place], [place], [place]    Office Visit    2 months ago Pain of right lower extremity    [address], [address] [place] [address] Margie Wagner, APRN    Office Visit    2 months ago Pain of right lower extremity    [facility], [address], [place], [place], APRN    Office Visit    3 months ago Type 2 diabetes mellitus without complication, without long-term current use of insulin [facility])    Kerry Rudd MD    Office Visit          Future Appointments         Provider Department Appt Notes    In 5 days Enzo Avelinoe, [address] send to ins after eval   [employer]   $20 c/p    In 2 weeks Enzo Hue, [address] [place]? [employer]   $20 c/p    In 2 weeks Enzo Hue, [address] [place] [place]? [employer]   $20 c/p    In 3 weeks Enzo Hue, [address] [place] [place]? [employer]   $20 c/p    In 1 month Enzo Hue, [address] [place] [place]? [employer]   $20 c/p    In 1 month Enzo Hue, [address] [place] [place]? [employer]   $20 c/p    In 1 month Enzo Hue, [address] [place] [place]? [employer]   $20 c/p    In 1 month Enzo Hue, [address] [place] [place]? [employer]   $20 c/p    In 1 month Zenon Flores DO [address] 4 wk f/u    In 3 months Lincoln Wong MD [address] [facility],[address], [address] [facility] Return in about 3 months (around 11/14/2023). Passed - EGFRCR or GFRNAA > 50     GFR Evaluation  EGFRCR: 69 , resulted on 6/1/2023             Future Appointments         Provider Department Appt Notes    In 5 days Enzo Avelinoe, [address] send to ins after eval   [employer]   $20 c/p    In 2 weeks Enzo Hue, [address] [place]? [employer]   $20 c/p    In 2 weeks Enzo Hue, [address] [place]?   [employer]   $20 c/p    In 3 weeks Enzo Hue, PT [address]? [employer]   $20 c/p    In 1 month Abbey Jennifer, [address]? [employer]   $20 c/p    In 1 month Abbey Jennifer, [address]? [employer]   $20 c/p    In 1 month Abbey Jennifer, [address]? [employer]   $20 c/p    In 1 month Abbey Jennifer, [address]? [employer]   $20 c/p    In 1 month Libby Frias DO [address] 4 wk f/u    In 3 months Sakshi Jaime MD [address], [place] [address] Return in about 3 months (around 11/14/2023).            Recent Outpatient Visits              1 week ago Encounter for annual health examination    Hakan Brown MD    Office Visit    1 month ago Right lumbar radiculopathy    [facility], [address], [place], [place]    Office Visit    2 months ago Pain of right lower extremity    [address] [facility], [facility] [place] APRVEDA    Office Visit    2 months ago Pain of right lower extremity    [facility], [address] [facility], APR    Office Visit    3 months ago Type 2 diabetes mellitus without complication, without long-term current use of insulin [facility])    [address], Vani Sanchez MD    Office Visit

## 2023-08-29 ENCOUNTER — OFFICE VISIT (OUTPATIENT)
Dept: PHYSICAL THERAPY | Facility: HOSPITAL | Age: 88
End: 2023-08-29
Attending: PHYSICAL MEDICINE & REHABILITATION
Payer: MEDICARE

## 2023-08-29 DIAGNOSIS — M54.16 RIGHT LUMBAR RADICULOPATHY: Primary | ICD-10-CM

## 2023-08-29 PROCEDURE — 97110 THERAPEUTIC EXERCISES: CPT | Performed by: PHYSICAL THERAPIST

## 2023-08-29 PROCEDURE — 97161 PT EVAL LOW COMPLEX 20 MIN: CPT | Performed by: PHYSICAL THERAPIST

## 2023-09-07 ENCOUNTER — OFFICE VISIT (OUTPATIENT)
Dept: PHYSICAL THERAPY | Facility: HOSPITAL | Age: 88
End: 2023-09-07
Attending: PHYSICAL MEDICINE & REHABILITATION
Payer: MEDICARE

## 2023-09-07 PROCEDURE — 97110 THERAPEUTIC EXERCISES: CPT | Performed by: PHYSICAL THERAPIST

## 2023-09-07 NOTE — PROGRESS NOTES
Diagnosis:   Right lumbar radiculopathy (M54.16)    Referring Provider: Wilfrido Myers  Date of Evaluation:    8/26/2023    Precautions:  CABG 2009, blood thinner, HTN, DM; bilat knee replacement; carotid artery surgery Next MD visit:   none scheduled  Date of Surgery: n/a   Extension based exercises including Ketty protocol  Strengthen core and gluts with improved core stabilization  Stretching of the hamstrings, iliopsoas, iliotibial band  Mobilization of the thoracolumbar fascia  Myofascial release and soft tissue release of the lumbar paraspinals, quadratus lumborum  Modalities as needed including graston, ultrasound, ice/heat  Please provide a home exercise program    Insurance Primary/Secondary: [facility] / N/A     # Auth Visits: 8            Subjective: pain continues to have fluctuations in symptoms, improves with sitting, standing and walking increases the pain. Standing/walking 5-10 minutes. No pain when laying down in his bed in the morning and at night. Pain: 6-7/10 with standing/walking      Objective: 5 minutes of walking tolerance prior to pain getting up to 6/10 pain level, right sided low back, pain in anterior thigh only when stretching into extension      Assessment: Patient responds well to use of flexion based activities for relief of low back pain. Patient requires cuing to note subtle changes in pain levels with tendency to group pain response all as \"the same\". With more questioning patient is able to delineate improved response to use of flexion/sitting rest breaks with near full relief of pain.        Goals:   Goals: (to be met in 8 visits)     Patient will demonstrate improved score on Oswestry by 10 points indicating improved function  Patient will report ability to stand for 10 minutes in order to allow patient to participate in meal preparation  Patient will report ability to walking for 15 minutes in order to allow patient to participate in exercise program, appointments  Patient will demonstrate improved AROM at lumbar spine extension by 10 degrees in order to allow patient to perform standing erect with reduced difficulty  Patient will demonstrate improved strength at bilateral hip abduction to 4/5 in order to improve lumbopelvic stability in gait    Plan: Continue PT services focusing on tolerance to upright posturing for standing/walking   Date: 9/7/2023  TX#: 2/8 Date:                 TX#: 3/ Date:                 TX#: 4/ Date:                 TX#: 5/ Date:    Tx#: 6/   TE Repeated movement assessment - flex, extension, rotation x 20 minutes  Review of HEP - addition of seated trunk rotation, review of seated flexion and marches x 6 minutes    Standing marches at bar - 2 x 10  SL - clams 2  10 bilat  SL reverse clams 2 x 10 bilat  SL hip flex 2 x 10 bilat  Patient unable to tolerate supine     40 minutes                                 HEP: seated trunk rotation, seated trunk flexion, seated hip marches    Charges: 3 therex       Total Timed Treatment: 40 min  Total Treatment Time: 45 min

## 2023-09-08 RX ORDER — BLOOD SUGAR DIAGNOSTIC
STRIP MISCELLANEOUS
Qty: 200 STRIP | Refills: 3 | Status: SHIPPED | OUTPATIENT
Start: 2023-09-08

## 2023-09-08 NOTE — TELEPHONE ENCOUNTER
Refill passed per [place] protocol. Requested Prescriptions   Pending Prescriptions Disp Refills    Alejandra In Vitro Strip [place] [facility] Name: Link Geovannier   Strip] 200 strip 1     Sig: TEST BLOOD SUGAR TWO TIMES DAILY       Diabetic Supplies Protocol Passed - 9/7/2023  2:52 AM        Passed - In person appointment or virtual visit in the past 12 mos or appointment in next 3 mos     Recent Outpatient Visits              [place], [place]    Office Visit    1 week ago Right lumbar radiculopathy    [place], [place]    Office Visit    3 weeks ago Encounter for annual health examination    Vani Schreiber MD    Office Visit    1 month ago Right lumbar radiculopathy    [facility], [address], [place], [place]    Office Visit    2 months ago Pain of right lower extremity    [facility], [address] [facility] [address]    Office Visit          Future Appointments         Provider Department Appt Notes    In 1 week [facility] 10 visits 8/29 to 11/29  [employer]   $20 c/p    In 2 weeks [facility] 10 visits 8/29 to 11/29  [employer]   $20 c/p    In 3 weeks [facility] 10 visits 8/29 to 11/29  [employer]   $20 c/p    In 1 month [facility] 10 visits 8/29 to 11/29  [employer]   $20 c/p    In 1 month [facility] 10 visits 8/29 to 11/29  [employer]   $20 c/p    In 1 month DO Oksana [facility], [address], [address] 4 wk f/u    In 2 months MD Wesley Altman, [address] [facility] Return in about 3 months (around 11/14/2023). Recent Outpatient Visits              [facility] [place]    Office Visit    1 week ago Right lumbar radiculopathy    [facility] [place]    Office Visit    3 weeks ago Encounter for annual health examination    Greg Walls MD    Office Visit    1 month ago Right lumbar radiculopathy    [facility], [address], [place], [place]    Office Visit    2 months ago Pain of right lower extremity    [facility], [address] [facility]    Office Visit            Future Appointments         Provider Department Appt Notes    In 1 week [facility] 10 visits 8/29 to 11/29  [employer]   $20 c/p    In 2 weeks [facility] 10 visits 8/29 to 11/29  [employer]   $20 c/p    In 3 weeks [facility] 10 visits 8/29 to 11/29  [employer]   $20 c/p    In 1 month [facility] 10 visits 8/29 to 11/29  [employer]   $20 c/p    In 1 month [facility] 10 visits 8/29 to 11/29  [employer]   $20 c/p    In 1 month DO Ritika Bliss, [address], [employer] 4 wk f/u    In 2 months MD Ritika Robles [address] Return in about 3 months (around 11/14/2023).

## 2023-09-12 ENCOUNTER — APPOINTMENT (OUTPATIENT)
Dept: PHYSICAL THERAPY | Facility: HOSPITAL | Age: 88
End: 2023-09-12
Attending: PHYSICAL MEDICINE & REHABILITATION
Payer: MEDICARE

## 2023-09-19 ENCOUNTER — OFFICE VISIT (OUTPATIENT)
Dept: PHYSICAL THERAPY | Facility: HOSPITAL | Age: 88
End: 2023-09-19
Attending: PHYSICAL MEDICINE & REHABILITATION
Payer: MEDICARE

## 2023-09-19 PROCEDURE — 97110 THERAPEUTIC EXERCISES: CPT | Performed by: PHYSICAL THERAPIST

## 2023-09-19 NOTE — PROGRESS NOTES
Diagnosis:   Right lumbar radiculopathy (M54.16)    Referring Provider: Odessia Burkitt  Date of Evaluation:    8/26/2023    Precautions:  CABG 2009, blood thinner, HTN, DM; bilat knee replacement; carotid artery surgery Next MD visit:   none scheduled  Date of Surgery: n/a   Extension based exercises including Ketty protocol  Strengthen core and gluts with improved core stabilization  Stretching of the hamstrings, iliopsoas, iliotibial band  Mobilization of the thoracolumbar fascia  Myofascial release and soft tissue release of the lumbar paraspinals, quadratus lumborum  Modalities as needed including graston, ultrasound, ice/heat  Please provide a home exercise program    Insurance Primary/Secondary: [facility] / N/A     # Auth Visits: 8            Subjective: no pain at rest, pain increases if standing in one position for long periods    Pain: 0/10 at rest, 6-7/10 with standing/walking      Objective:   9/16: 5 minutes of walking tolerance remains but pain less, 5/10 pain level, right sided low back, pain in anterior thigh only when stretching into extension      Assessment: Patient continues to have fluctuating pain, poor compliance with HEP. Patient continues to function at levels below baseline. Patient with poor tolerance to standing/walking. Patient will benefit from continued PT services to improve tolerance to standing and walking activities.        Goals:   Goals: (to be met in 8 visits)     Patient will demonstrate improved score on Oswestry by 10 points indicating improved function  Patient will report ability to stand for 10 minutes in order to allow patient to participate in meal preparation  Patient will report ability to walking for 15 minutes in order to allow patient to participate in exercise program, appointments  Patient will demonstrate improved AROM at lumbar spine extension by 10 degrees in order to allow patient to perform standing erect with reduced difficulty  Patient will demonstrate improved strength at bilateral hip abduction to 4/5 in order to improve lumbopelvic stability in gait    Plan: Continue PT services focusing on tolerance to upright posturing for standing/walking   Date: 9/7/2023  TX#: 2/8 Date:  9/19/23              TX#: 3/8 Date:                 TX#: 4/ Date:                 TX#: 5/ Date:    Tx#: 6/   TE Repeated movement assessment - flex, extension, rotation x 20 minutes  Review of HEP - addition of seated trunk rotation, review of seated flexion and marches x 6 minutes    Standing marches at bar - 2 x 10  SL - clams 2  10 bilat  SL reverse clams 2 x 10 bilat  SL hip flex 2 x 10 bilat  Patient unable to tolerate supine     40 minutes   Seated marches 1'  Seated hip add 1'  Seated hip abd 1'  Seated S roll outs flex 2 x 10  Seated SB roll outs R/L 2 x 10          5 minute walking assessment    30 minutes                              HEP: seated trunk rotation, seated trunk flexion, seated hip marches    Charges: 2 therex       Total Timed Treatment: 30 min  Total Treatment Time: 35 min

## 2023-09-20 ENCOUNTER — TELEPHONE (OUTPATIENT)
Dept: INTERNAL MEDICINE CLINIC | Facility: CLINIC | Age: 88
End: 2023-09-20

## 2023-09-20 RX ORDER — MELATONIN
325
Qty: 36 TABLET | Refills: 0 | Status: SHIPPED | OUTPATIENT
Start: 2023-09-20

## 2023-09-20 NOTE — TELEPHONE ENCOUNTER
Patient is out of Rx:  ferrous sulfate 325 (65 FE) MG Oral Tab EC   [facility] to [facility] in [address] on [facility].

## 2023-09-20 NOTE — TELEPHONE ENCOUNTER
I called the patient who stated he is not using [facility] anymore and needs to be sent to [facility].   One refill remaining [facility] to [facility]

## 2023-09-26 ENCOUNTER — OFFICE VISIT (OUTPATIENT)
Dept: PHYSICAL THERAPY | Facility: HOSPITAL | Age: 88
End: 2023-09-26
Attending: PHYSICAL MEDICINE & REHABILITATION
Payer: MEDICARE

## 2023-09-26 PROCEDURE — 97110 THERAPEUTIC EXERCISES: CPT | Performed by: PHYSICAL THERAPIST

## 2023-09-26 PROCEDURE — 97112 NEUROMUSCULAR REEDUCATION: CPT | Performed by: PHYSICAL THERAPIST

## 2023-09-26 NOTE — PROGRESS NOTES
Diagnosis:   Right lumbar radiculopathy (M54.16)    Referring Provider: Masoud Azul  Date of Evaluation:    8/26/2023    Precautions:  CABG 2009, blood thinner, HTN, DM; bilat knee replacement; carotid artery surgery Next MD visit:   none scheduled  Date of Surgery: n/a   Extension based exercises including Ketty protocol  Strengthen core and gluts with improved core stabilization  Stretching of the hamstrings, iliopsoas, iliotibial band  Mobilization of the thoracolumbar fascia  Myofascial release and soft tissue release of the lumbar paraspinals, quadratus lumborum  Modalities as needed including graston, ultrasound, ice/heat  Please provide a home exercise program    Insurance Primary/Secondary: [employer] [facility] / N/A     # Auth Visits: 8            Subjective: less pain at rest now than he had before, increased pain when first waking in the morning or if sitting for long periods when he goes to stand up. Pain comes and goes now, not as consistent as before. 8/10 when waking in the morning. R thigh pain comes and goes especially when he is tired. Pain: 0/10 at rest, 6-7/10 with standing/walking      Objective:   9/26: 3 minute walk - 405 ft    9/16: 5 minutes of walking tolerance remains but pain less, 5/10 pain level, right sided low back, pain in anterior thigh only when stretching into extension      Assessment: Patient is able to tolerate 11 minutes of standing today prior to fatigue requiring rest break. Continues with improving symptoms during the day. Encouraged focused stretching when waking in the morning due to onset of pain upon standing. Patient will benefit from continued PT services to improve tolerance to standing and walking activities.        Goals:   Goals: (to be met in 8 visits)     Patient will demonstrate improved score on Oswestry by 10 points indicating improved function  Patient will report ability to stand for 10 minutes in order to allow patient to participate in meal preparation  Patient will report ability to walking for 15 minutes in order to allow patient to participate in exercise program, appointments  Patient will demonstrate improved AROM at lumbar spine extension by 10 degrees in order to allow patient to perform standing erect with reduced difficulty  Patient will demonstrate improved strength at bilateral hip abduction to 4/5 in order to improve lumbopelvic stability in gait    Plan: Continue PT services focusing on tolerance to upright posturing for standing/walking   Date: 9/7/2023  TX#: 2/8 Date:  9/19/23              TX#: 3/8 Date:   9/26/23              TX#: 4/8 Date:                 TX#: 5/ Date:    Tx#: 6/ TE Repeated movement assessment - flex, extension, rotation x 20 minutes  Review of HEP - addition of seated trunk rotation, review of seated flexion and marches x 6 minutes    Standing marches at bar - 2 x 10  SL - clams 2  10 bilat  SL reverse clams 2 x 10 bilat  SL hip flex 2 x 10 bilat  Patient unable to tolerate supine     40 minutes   Seated marches 1'  Seated hip add 1'  Seated hip abd 1'  Seated S roll outs flex 2 x 10  Seated SB roll outs R/L 2 x 10          5 minute walking assessment    30 minutes Walking x 3' - focus on upright posture, sequencing with cane    Repeat walking test 3 minutes  Review and update of HEP    10 minutes       NM   Standing:  Marches 1'   Alt hip abd 1'  Alt hip ext 1'  Alt hip flex 1'  Side stepping 4 laps (10ft)  High knee marches 4 laps (10ft)  Tandem balance 1' bilat  Romberg EO 1'  Romberg EO 1'     20 minutes                     HEP: seated trunk rotation, seated trunk flexion, seated hip marches; addition of standing marches, hip abd, side stepping    Charges: 1 therex  , 1 nm     Total Timed Treatment: 30 min  Total Treatment Time: 35 min

## 2023-10-02 ENCOUNTER — APPOINTMENT (OUTPATIENT)
Dept: PHYSICAL THERAPY | Facility: HOSPITAL | Age: 88
End: 2023-10-02
Attending: PHYSICAL MEDICINE & REHABILITATION
Payer: MEDICARE

## 2023-10-03 ENCOUNTER — APPOINTMENT (OUTPATIENT)
Dept: PHYSICAL THERAPY | Facility: HOSPITAL | Age: 88
End: 2023-10-03
Attending: PHYSICAL MEDICINE & REHABILITATION
Payer: MEDICARE

## 2023-10-09 ENCOUNTER — OFFICE VISIT (OUTPATIENT)
Dept: PHYSICAL THERAPY | Facility: HOSPITAL | Age: 88
End: 2023-10-09
Attending: PHYSICAL MEDICINE & REHABILITATION
Payer: MEDICARE

## 2023-10-09 PROCEDURE — 97110 THERAPEUTIC EXERCISES: CPT | Performed by: PHYSICAL THERAPIST

## 2023-10-09 NOTE — PROGRESS NOTES
Diagnosis:   Right lumbar radiculopathy (M54.16)    Referring Provider: Giovani Roberson  Date of Evaluation:    8/26/2023    Precautions:  CABG 2009, blood thinner, HTN, DM; bilat knee replacement; carotid artery surgery Next MD visit:   none scheduled  Date of Surgery: n/a   Extension based exercises including Ketty protocol  Strengthen core and gluts with improved core stabilization  Stretching of the hamstrings, iliopsoas, iliotibial band  Mobilization of the thoracolumbar fascia  Myofascial release and soft tissue release of the lumbar paraspinals, quadratus lumborum  Modalities as needed including graston, ultrasound, ice/heat  Please provide a home exercise program    Insurance Primary/Secondary: [employer] [facility] / N/A     # Auth Visits: 8           Discharge Summary  Pt has attended 5 visits in Physical Therapy. Subjective: sitting down in certain positions he is able to get comfortable but if he bends forward too long or reaches overhead (describing extension) it increases to 7/10. Right thigh symptoms are also present during these episodes. Patient wishes today to be his last day. Pain: 0/10 at rest, 6-7/10 with standing/walking      Objective:   10/11/23: AROM at lumbar spine - limited ext to neutral; flexion [facility] however provokes familiar pain      9/26: 3 minute walk - 405 ft    9/16: 5 minutes of walking tolerance remains but pain less, 5/10 pain level, right sided low back, pain in anterior thigh only when stretching into extension      Assessment: Patient wishes to discontinue PT at this time, will continue with his stretching and posture modifications. Will plan to follow up with Dr. Giovani Roberson in the future. Patient's assessment today demonstrates improved AROM at lumbar spine flexion remains with limitations in extension tolerance. Patient continues to rely on cane.        Goals:   Goals: (to be met in 8 visits)     Patient will demonstrate improved score on Oswestry by 10 points indicating improved function MET  Patient will report ability to stand for 10 minutes in order to allow patient to participate in meal preparation MET  Patient will report ability to walking for 15 minutes in order to allow patient to participate in exercise program, appointments MET  Patient will demonstrate improved AROM at lumbar spine extension by 10 degrees in order to allow patient to perform standing erect with reduced difficulty NOT MET  Patient will demonstrate improved strength at bilateral hip abduction to 4/5 in order to improve lumbopelvic stability in gait MET  Oswestry Disability Index Score  Score: 48 % (8/29/2023  3:49 PM)    Post Oswestry Disability Index Score  Post Score: 34 % (10/9/2023 12:08 PM)    14 % improvement    Plan: Continue HEP independently, follow up with Dr. Suzi Dakins as directed. Patient/Family/Caregiver was advised of these findings, precautions, and treatment options and has agreed to actively participate in planning and for this course of care. Thank you for your referral. If you have any questions, please contact me at Dept: [phone]. Sincerely,  Electronically signed by therapist: Antonia Perrin PT     Physician's certification required:  No  Please co-sign or sign and return this letter via fax as soon as possible to [phone]. I certify the need for these services furnished under this plan of treatment and while under my care. X___________________________________________________ Date____________________    Certification From: 48/4/6588  To:1/7/2024      Date: 9/7/2023  TX#: 2/8 Date:  9/19/23              TX#: 3/8 Date:   9/26/23              TX#: 4/8 Date: 10/9/23                TX#: 5/8 Date:    Tx#: 6/ TE Repeated movement assessment - flex, extension, rotation x 20 minutes  Review of HEP - addition of seated trunk rotation, review of seated flexion and marches x 6 minutes    Standing marches at bar - 2 x 10  SL - clams 2  10 bilat  SL reverse clams 2 x 10 bilat  SL hip flex 2 x 10 bilat  Patient unable to tolerate supine     40 minutes   Seated marches 1'  Seated hip add 1'  Seated hip abd 1'  Seated S roll outs flex 2 x 10  Seated SB roll outs R/L 2 x 10          5 minute walking assessment    30 minutes Walking x 3' - focus on upright posture, sequencing with cane    Repeat walking test 3 minutes  Review and update of HEP    10 minutes   Reassessment x 10'  Review and update of HEP - continue with seated exercises  Addition of posture roll when sitting - significant improvement in resting symptoms today         23 minutes    NM   Standing:  Marches 1'   Alt hip abd 1'  Alt hip ext 1'  Alt hip flex 1'  Side stepping 4 laps (10ft)  High knee marches 4 laps (10ft)  Tandem balance 1' bilat  Romberg EO 1'  Romberg EO 1'     20 minutes                     HEP: seated trunk rotation, seated trunk flexion, seated hip marches; addition of standing marches, hip abd, side stepping    Charges: 2 TE  Total Timed Treatment: 23 min  Total Treatment Time: 23 min

## 2023-10-12 ENCOUNTER — APPOINTMENT (OUTPATIENT)
Dept: PHYSICAL THERAPY | Facility: HOSPITAL | Age: 88
End: 2023-10-12
Attending: PHYSICAL MEDICINE & REHABILITATION
Payer: MEDICARE

## 2023-10-17 ENCOUNTER — APPOINTMENT (OUTPATIENT)
Dept: PHYSICAL THERAPY | Facility: HOSPITAL | Age: 88
End: 2023-10-17
Attending: PHYSICAL MEDICINE & REHABILITATION
Payer: MEDICARE

## 2023-11-08 DIAGNOSIS — E11.9 TYPE 2 DIABETES MELLITUS WITHOUT COMPLICATION, WITHOUT LONG-TERM CURRENT USE OF INSULIN (HCC): ICD-10-CM

## 2023-11-08 RX ORDER — TERAZOSIN 2 MG/1
2 CAPSULE ORAL NIGHTLY
Qty: 90 CAPSULE | Refills: 1 | Status: SHIPPED | OUTPATIENT
Start: 2023-11-08

## 2023-11-08 RX ORDER — GLIPIZIDE AND METFORMIN HCL 2.5; 5 MG/1; MG/1
1 TABLET, FILM COATED ORAL 2 TIMES DAILY WITH MEALS
Qty: 180 TABLET | Refills: 3 | Status: SHIPPED | OUTPATIENT
Start: 2023-11-08

## 2023-11-08 NOTE — TELEPHONE ENCOUNTER
Please review; protocol failed. Requested Prescriptions   Pending Prescriptions Disp Refills    terazosin 2 MG Oral Cap 90 capsule 1     Sig: Take 1 capsule (2 mg total) by mouth nightly. Hypertensive Medications Protocol Failed - 11/8/2023 11:57 AM        Failed - Last BP reading less than 140/90     BP Readings from Last 1 Encounters:   08/14/23 154/60               Passed - In person appointment in the past 12 or next 3 months     Recent Outpatient Visits              1 month ago     [place], [place]    Office Visit    1 month ago     [place]    Office Visit    1 month ago     [place]    Office Visit    2 months ago     [place]    Office Visit    2 months ago Right lumbar radiculopathy    [place], [place]    Office Visit          Future Appointments         Provider Department Appt Notes    In 2 weeks Manuela Velasquez MD [address], [place] [facility] Return in about 3 months (around 11/14/2023).     In 1 month DO John Jacques-[facility], [address], [place] 4 wk f/u               Passed - CMP or BMP in past 6 months     Recent Results (from the past 4392 hour(s))   Comp Metabolic Panel (14)    Collection Time: 06/01/23  4:50 PM   Result Value Ref Range    Glucose 122 (H) 70 - 99 mg/dL    Sodium 143 136 - 145 mmol/L    Potassium 4.0 3.5 - 5.1 mmol/L    Chloride 109 98 - 112 mmol/L    CO2 25.0 21.0 - 32.0 mmol/L    Anion Gap 9 0 - 18 mmol/L    BUN 26 (H) 7 - 18 mg/dL    Creatinine 1.05 0.70 - 1.30 mg/dL    BUN/CREA Ratio 24.8 (H) 10.0 - 20.0    Calcium, Total 9.4 8.5 - 10.1 mg/dL    Calculated Osmolality 302 (H) 275 - 295 mOsm/kg    eGFR-Cr 69 >=60 mL/min/1.73m2    ALT 20 16 - 61 U/L    AST 13 (L) 15 - 37 U/L    Alkaline Phosphatase 73 45 - 117 U/L    Bilirubin, Total 0.3 0.1 - 2.0 mg/dL    Total Protein 6.9 6.4 - 8.2 g/dL    Albumin 3.4 3.4 - 5.0 g/dL    Globulin  3.5 2.8 - 4.4 g/dL    A/G Ratio 1.0 1.0 - 2.0     *Note: Due to a large number of results and/or encounters for the requested time period, some results have not been displayed. A complete set of results can be found in Results Review. Passed - In person appointment or virtual visit in the past 6 months     Recent Outpatient Visits              1 month ago     [place], [place]    Office Visit    1 month ago     [place]    Office Visit    1 month ago     [place]    Office Visit    2 months ago     [place]    Office Visit    2 months ago Right lumbar radiculopathy    [place], [place]    Office Visit          Future Appointments         Provider Department Appt Notes    In 2 weeks MD Tatyana Espinoza, [place] [address] Return in about 3 months (around 11/14/2023). In 1 month DO John Allen-[facility], [address], [place] 4 wk f/u               Passed - [place] or [facility] > 50     GFR Evaluation  EGFRCR: 69 , resulted on 6/1/2023           Signed Prescriptions Disp Refills    glipiZIDE-metFORMIN HCl 2.5-500 MG Oral Tab 180 tablet 3     Sig: Take 1 tablet by mouth 2 (two) times daily with meals.        Diabetes Medication Protocol Passed - 11/8/2023 11:57 AM        Passed - Last A1C < 7.5 and within past 6 months     Lab Results   Component Value Date    A1C 7.1 (A) 08/14/2023             Passed - In person appointment or virtual visit in the past 6 mos or appointment in next 3 mos     Recent Outpatient Visits              1 month ago     [place], [place]    Office Visit    1 month ago     [place], [place]    Office Visit    1 month ago     [address] [facility], [place], [place]    Office Visit    2 months ago     [place], [place]    Office Visit    2 months ago Right lumbar radiculopathy    [place], [place]    Office Visit          Future Appointments         Provider Department Appt Notes    In 2 weeks Darius Rowe MD [address], [place] Return in about 3 months (around 11/14/2023). [facility] 1 month [facility], [address], [place] 4 wk f/u               Passed - [facility] or [facility] > 50     GFR Evaluation  EGFRCR: 69 , resulted on 6/1/2023          Passed - GFR in the past 12 months           Recent Outpatient Visits              1 month ago     [place], [place]    Office Visit    1 month ago     [place]    Office Visit    1 month ago     [place]    Office Visit    2 months ago     [place], [place]    Office Visit    2 months ago Right lumbar radiculopathy    [place], [place]    Office Visit          Future Appointments         Provider Department Appt Notes    In 2 weeks Darius Rowe MD [address], [place] Return in about 3 months (around 11/14/2023).     In 1 month [facility], [address], [place] 4 wk f/u

## 2023-11-08 NOTE — TELEPHONE ENCOUNTER
Refill passed per [employer], [facility] protocol. Requested Prescriptions   Pending Prescriptions Disp Refills    glipiZIDE-metFORMIN HCl 2.5-500 MG Oral Tab 180 tablet 1     Sig: Take 1 tablet by mouth 2 (two) times daily with meals. Diabetes Medication Protocol Passed - 11/8/2023 11:57 AM        Passed - Last A1C < 7.5 and within past 6 months     Lab Results   Component Value Date    A1C 7.1 (A) 08/14/2023             Passed - In person appointment or virtual visit in the past 6 mos or appointment in next 3 mos     Recent Outpatient Visits              1 month ago     [place], [place]    Office Visit    1 month ago     [place]    Office Visit    1 month ago     [place]    Office Visit    2 months ago     [place]    Office Visit    2 months ago Right lumbar radiculopathy    [place], [place]    Office Visit          Future Appointments         Provider Department Appt Notes    In 2 weeks MD [facility] Return in about 3 months (around 11/14/2023). In 1 month DO John Bazzi-[facility], [address], [place] 4 wk f/u               Passed - [facility] or [facility] > 50     GFR Evaluation  EGFRCR: 69 , resulted on 6/1/2023          Passed - GFR in the past 12 months          terazosin 2 MG Oral Cap 90 capsule 1     Sig: Take 1 capsule (2 mg total) by mouth nightly.        Hypertensive Medications Protocol Failed - 11/8/2023 11:57 AM        Failed - Last BP reading less than 140/90     BP Readings from Last 1 Encounters:   08/14/23 154/60               Passed - In person appointment in the past 12 or next 3 months     Recent Outpatient Visits              1 month ago     [place], [place] Office Visit    1 month ago     [place], [place]    Office Visit    1 month ago     [address] [facility], [place], [place]    Office Visit    2 months ago     [place], [place]    Office Visit    2 months ago Right lumbar radiculopathy    [place], [place]    Office Visit          Future Appointments         Provider Department Appt Notes    In 2 weeks Nakia Mann MD [address], [place] Return in about 3 months (around 11/14/2023). In 1 month Danielle Stewart DO [facility], [address], [place] 4 wk f/u               Passed - CMP or BMP in past 6 months     Recent Results (from the past 4392 hour(s))   Comp Metabolic Panel (14)    Collection Time: 06/01/23  4:50 PM   Result Value Ref Range    Glucose 122 (H) 70 - 99 mg/dL    Sodium 143 136 - 145 mmol/L    Potassium 4.0 3.5 - 5.1 mmol/L    Chloride 109 98 - 112 mmol/L    CO2 25.0 21.0 - 32.0 mmol/L    Anion Gap 9 0 - 18 mmol/L    BUN 26 (H) 7 - 18 mg/dL    Creatinine 1.05 0.70 - 1.30 mg/dL    BUN/CREA Ratio 24.8 (H) 10.0 - 20.0    Calcium, Total 9.4 8.5 - 10.1 mg/dL    Calculated Osmolality 302 (H) 275 - 295 mOsm/kg    eGFR-Cr 69 >=60 mL/min/1.73m2    ALT 20 16 - 61 U/L    AST 13 (L) 15 - 37 U/L    Alkaline Phosphatase 73 45 - 117 U/L    Bilirubin, Total 0.3 0.1 - 2.0 mg/dL    Total Protein 6.9 6.4 - 8.2 g/dL    Albumin 3.4 3.4 - 5.0 g/dL    Globulin  3.5 2.8 - 4.4 g/dL    A/G Ratio 1.0 1.0 - 2.0     *Note: Due to a large number of results and/or encounters for the requested time period, some results have not been displayed. A complete set of results can be found in Results Review.                Passed - In person appointment or virtual visit in the past 6 months     Recent Outpatient Visits              1 month ago     [place], [place] Office Visit    1 month ago     [place], [place]    Office Visit    1 month ago     [address] [facility], [place], [place]    Office Visit    2 months ago     [place], [place]    Office Visit    2 months ago Right lumbar radiculopathy    [place], [place]    Office Visit          Future Appointments         Provider Department Appt Notes    In 2 weeks MD Gema Dee [place] Return in about 3 months (around 11/14/2023). In 1 month Danielle Ko, DO [facility], [address], [place] 4 wk f/u               Passed - [facility] or [facility] > 50     GFR Evaluation  EGFRCR: 69 , resulted on 6/1/2023             Recent Outpatient Visits              1 month ago     [place], [place]    Office Visit    1 month ago     [place]    Office Visit    1 month ago     [place]    Office Visit    2 months ago     [place]    Office Visit    2 months ago Right lumbar radiculopathy    [place], [place]    Office Visit          Future Appointments         Provider Department Appt Notes    In 2 weeks MD Gema Dee [place] Return in about 3 months (around 11/14/2023).     In 1 month Danielle Ko, DO [facility], [address], [place] 4 wk f/u

## 2023-11-08 NOTE — TELEPHONE ENCOUNTER
Patient requesting refills of:  terazosin 2 MG Oral Cap   glipiZIDE-metFORMIN HCl 2.5-500 MG Oral Tab    Please send to:  [facility]/pharmacy #[id number]- [address]

## 2023-11-27 ENCOUNTER — OFFICE VISIT (OUTPATIENT)
Facility: CLINIC | Age: 88
End: 2023-11-27

## 2023-11-27 VITALS
DIASTOLIC BLOOD PRESSURE: 62 MMHG | WEIGHT: 108 LBS | HEART RATE: 76 BPM | HEIGHT: 60 IN | RESPIRATION RATE: 18 BRPM | SYSTOLIC BLOOD PRESSURE: 160 MMHG | BODY MASS INDEX: 21.2 KG/M2 | TEMPERATURE: 98 F

## 2023-11-27 DIAGNOSIS — I25.119 ATHEROSCLEROSIS OF NATIVE CORONARY ARTERY OF NATIVE HEART WITH ANGINA PECTORIS (HCC): ICD-10-CM

## 2023-11-27 DIAGNOSIS — I65.23 BILATERAL CAROTID ARTERY STENOSIS: ICD-10-CM

## 2023-11-27 DIAGNOSIS — I51.89 DIASTOLIC DYSFUNCTION: ICD-10-CM

## 2023-11-27 DIAGNOSIS — E11.9 TYPE 2 DIABETES MELLITUS WITHOUT COMPLICATION, WITHOUT LONG-TERM CURRENT USE OF INSULIN (HCC): Primary | ICD-10-CM

## 2023-11-27 DIAGNOSIS — I10 ESSENTIAL HYPERTENSION: ICD-10-CM

## 2023-11-27 LAB
CARTRIDGE LOT#: 637 NUMERIC
HEMOGLOBIN A1C: 6.1 % (ref 4.3–5.6)

## 2023-11-27 PROCEDURE — 3077F SYST BP >= 140 MM HG: CPT | Performed by: INTERNAL MEDICINE

## 2023-11-27 PROCEDURE — 1126F AMNT PAIN NOTED NONE PRSNT: CPT | Performed by: INTERNAL MEDICINE

## 2023-11-27 PROCEDURE — 1159F MED LIST DOCD IN RCRD: CPT | Performed by: INTERNAL MEDICINE

## 2023-11-27 PROCEDURE — 99213 OFFICE O/P EST LOW 20 MIN: CPT | Performed by: INTERNAL MEDICINE

## 2023-11-27 PROCEDURE — 3078F DIAST BP <80 MM HG: CPT | Performed by: INTERNAL MEDICINE

## 2023-11-27 PROCEDURE — 1160F RVW MEDS BY RX/DR IN RCRD: CPT | Performed by: INTERNAL MEDICINE

## 2023-11-27 PROCEDURE — 83036 HEMOGLOBIN GLYCOSYLATED A1C: CPT | Performed by: INTERNAL MEDICINE

## 2023-11-27 PROCEDURE — 3008F BODY MASS INDEX DOCD: CPT | Performed by: INTERNAL MEDICINE

## 2023-11-27 RX ORDER — NEOMYCIN SULFATE, POLYMYXIN B SULFATE, AND DEXAMETHASONE 3.5; 10000; 1 MG/G; [USP'U]/G; MG/G
OINTMENT OPHTHALMIC
COMMUNITY
Start: 2023-11-01

## 2023-11-27 RX ORDER — NEOMYCIN SULFATE, POLYMYXIN B SULFATE AND DEXAMETHASONE 3.5; 10000; 1 MG/ML; [USP'U]/ML; MG/ML
SUSPENSION/ DROPS OPHTHALMIC
COMMUNITY
Start: 2023-11-21

## 2023-11-28 ENCOUNTER — NURSE TRIAGE (OUTPATIENT)
Dept: INTERNAL MEDICINE CLINIC | Facility: CLINIC | Age: 88
End: 2023-11-28

## 2023-11-28 NOTE — TELEPHONE ENCOUNTER
Action Requested: Summary for Provider     []  Critical Lab, Recommendations Needed  [x] Need Additional Advice  []   FYI    []   Need Orders  [] Need Medications Sent to Pharmacy  []  Other     SUMMARY: Patient called with blood pressure readings. He was in the office yesterday. Checked at 10 am prior to taking his medication  162/66. Blood pressure entered into patient reported vitals in chart. Patient denies headache, blurry vision, or any other symptoms. Patient states Melody Mejia may have to adjust his blood pressure medications. Reason for call: No chief complaint on file.   Onset: today    Reason for Disposition   Systolic BP >= 186 OR Diastolic >= 409    Protocols used: High Blood Pressure-A-OH

## 2023-11-30 NOTE — TELEPHONE ENCOUNTER
Call pt. Based on these numbers and his BP in the office, he should increase the amlodipine from 5 mg up to 10 mg a day. Continue other meds. Chack BP. Contact us with results in 1-2 weeks.

## 2023-12-01 NOTE — TELEPHONE ENCOUNTER
Attempted to call patient ,phone was not answered ,could not leave a message     Called patient daughter  Dewayne Vargas ( identified name and  )  ( on [employer] )   informed of Dr Deborah Laws instructions below    Patient  daughter verbalizes understanding and agrees with plan. Dewayne Vargas  will  contact the patient and relay the message.

## 2023-12-12 RX ORDER — MELATONIN
325
Qty: 39 TABLET | Refills: 1 | Status: SHIPPED | OUTPATIENT
Start: 2023-12-13 | End: 2023-12-18

## 2023-12-12 NOTE — TELEPHONE ENCOUNTER
Please review. Protocol failed / Has no protocol. No Active/ Future labs pended    Requested Prescriptions   Pending Prescriptions Disp Refills    FERROUS SULFATE 325 (65 FE) MG Oral Tab EC [Pharmacy Med Name: FERROUS SULF  MG TABLET] 36 tablet 0     Sig: TAKE 1 TABLET (325 MG TOTAL) BY MOUTH 3 TIMES A WEEK       There is no refill protocol information for this order        Future Appointments         Provider Department Appt Notes    In 1 week Nimo Bishop DO [facility], [address] [facility], [place] 4 wk f/u    In 2 months Silver Morin MD [address] Return in about 3 months (around 2/27/2024).            Recent Outpatient Visits              2 weeks ago Type 2 diabetes mellitus without complication, without long-term current use of insulin [facility])    Meera Garg MD    Office Visit    2 months ago     [place], [place]    Office Visit    2 months ago     [place]    Office Visit    2 months ago     [place]    Office Visit    3 months ago     [place], [place]    Office Visit

## 2023-12-18 RX ORDER — MELATONIN
325
Qty: 39 TABLET | Refills: 1 | Status: SHIPPED | OUTPATIENT
Start: 2023-12-18

## 2023-12-18 NOTE — TELEPHONE ENCOUNTER
Patient calling to inform that he was told by the pharmacy that they have not received this prescription, please re-send, patient is out of medication

## 2023-12-20 ENCOUNTER — TELEPHONE (OUTPATIENT)
Dept: INTERNAL MEDICINE CLINIC | Facility: CLINIC | Age: 88
End: 2023-12-20

## 2023-12-27 RX ORDER — CLOPIDOGREL BISULFATE 75 MG/1
75 TABLET ORAL DAILY
Qty: 90 TABLET | Refills: 1 | Status: SHIPPED | OUTPATIENT
Start: 2023-12-27

## 2023-12-27 NOTE — TELEPHONE ENCOUNTER
Please review; protocol failed/ has no protocol    Mohsen Deckera3 hours ago (10:59 AM)     MD  Per patient he needs refill on his clopidogrel. Patient is out of med. Requested Prescriptions   Pending Prescriptions Disp Refills    clopidogrel 75 MG Oral Tab 90 tablet 1     Sig: Take 1 tablet (75 mg total) by mouth daily. There is no refill protocol information for this order        Recent Outpatient Visits              1 month ago Type 2 diabetes mellitus without complication, without long-term current use of insulin [facility])    Collin Paul, Wagner Suh MD    Office Visit    2 months ago     [place], [place]    Office Visit    3 months ago     [place]    Office Visit    3 months ago     [place]    Office Visit    3 months ago     [place], [place]    Office Visit          Future Appointments         Provider Department Appt Notes    In 2 months Aldo Cottrell MD [address], [facility] 84 Return in about 3 months (around 2/27/2024).

## 2023-12-27 NOTE — TELEPHONE ENCOUNTER
Per patient he needs refill on his clopidogrel. Patient is out of med. Current Outpatient Medications   Medication Sig Dispense Refill                                                                                                      clopidogrel 75 MG Oral Tab Take 1 tablet (75 mg total) by mouth daily.  90 tablet 1

## 2024-01-13 ENCOUNTER — HOSPITAL ENCOUNTER (EMERGENCY)
Facility: HOSPITAL | Age: 89
Discharge: HOME | End: 2024-01-13
Attending: EMERGENCY MEDICINE
Payer: MEDICARE

## 2024-01-13 ENCOUNTER — APPOINTMENT (OUTPATIENT)
Dept: GENERAL RADIOLOGY | Facility: HOSPITAL | Age: 89
End: 2024-01-13
Attending: EMERGENCY MEDICINE
Payer: MEDICARE

## 2024-01-13 VITALS
WEIGHT: 110 LBS | BODY MASS INDEX: 21.6 KG/M2 | TEMPERATURE: 98 F | HEIGHT: 60 IN | OXYGEN SATURATION: 92 % | SYSTOLIC BLOOD PRESSURE: 156 MMHG | DIASTOLIC BLOOD PRESSURE: 62 MMHG | RESPIRATION RATE: 17 BRPM | HEART RATE: 57 BPM

## 2024-01-13 DIAGNOSIS — I50.9 ACUTE ON CHRONIC CONGESTIVE HEART FAILURE, UNSPECIFIED HEART FAILURE TYPE (HCC): Primary | ICD-10-CM

## 2024-01-13 LAB
ANION GAP SERPL CALC-SCNC: 8 MMOL/L (ref 0–18)
BASOPHILS # BLD AUTO: 0.07 X10(3) UL (ref 0–0.2)
BASOPHILS NFR BLD AUTO: 0.8 %
BNP SERPL-MCNC: 1681 PG/ML
BUN BLD-MCNC: 22 MG/DL (ref 9–23)
BUN/CREAT SERPL: 20.2 (ref 10–20)
CALCIUM BLD-MCNC: 9.6 MG/DL (ref 8.7–10.4)
CHLORIDE SERPL-SCNC: 109 MMOL/L (ref 98–112)
CO2 SERPL-SCNC: 22 MMOL/L (ref 21–32)
CREAT BLD-MCNC: 1.09 MG/DL
DEPRECATED RDW RBC AUTO: 47.3 FL (ref 35.1–46.3)
EGFRCR SERPLBLD CKD-EPI 2021: 66 ML/MIN/1.73M2 (ref 60–?)
EOSINOPHIL # BLD AUTO: 1.23 X10(3) UL (ref 0–0.7)
EOSINOPHIL NFR BLD AUTO: 13.6 %
ERYTHROCYTE [DISTWIDTH] IN BLOOD BY AUTOMATED COUNT: 15.9 % (ref 11–15)
GLUCOSE BLD-MCNC: 243 MG/DL (ref 70–99)
HCT VFR BLD AUTO: 32.3 %
HGB BLD-MCNC: 10.4 G/DL
IMM GRANULOCYTES # BLD AUTO: 0.02 X10(3) UL (ref 0–1)
IMM GRANULOCYTES NFR BLD: 0.2 %
LYMPHOCYTES # BLD AUTO: 0.78 X10(3) UL (ref 1–4)
LYMPHOCYTES NFR BLD AUTO: 8.6 %
MCH RBC QN AUTO: 26.4 PG (ref 26–34)
MCHC RBC AUTO-ENTMCNC: 32.2 G/DL (ref 31–37)
MCV RBC AUTO: 82 FL
MONOCYTES # BLD AUTO: 0.51 X10(3) UL (ref 0.1–1)
MONOCYTES NFR BLD AUTO: 5.6 %
NEUTROPHILS # BLD AUTO: 6.45 X10 (3) UL (ref 1.5–7.7)
NEUTROPHILS # BLD AUTO: 6.45 X10(3) UL (ref 1.5–7.7)
NEUTROPHILS NFR BLD AUTO: 71.2 %
OSMOLALITY SERPL CALC.SUM OF ELEC: 299 MOSM/KG (ref 275–295)
PLATELET # BLD AUTO: 233 10(3)UL (ref 150–450)
POTASSIUM SERPL-SCNC: 4.4 MMOL/L (ref 3.5–5.1)
RBC # BLD AUTO: 3.94 X10(6)UL
SODIUM SERPL-SCNC: 139 MMOL/L (ref 136–145)
TROPONIN I SERPL HS-MCNC: 40 NG/L
WBC # BLD AUTO: 9.1 X10(3) UL (ref 4–11)

## 2024-01-13 PROCEDURE — 71045 X-RAY EXAM CHEST 1 VIEW: CPT | Performed by: EMERGENCY MEDICINE

## 2024-01-13 PROCEDURE — 99284 EMERGENCY DEPT VISIT MOD MDM: CPT

## 2024-01-13 PROCEDURE — 80048 BASIC METABOLIC PNL TOTAL CA: CPT | Performed by: EMERGENCY MEDICINE

## 2024-01-13 PROCEDURE — 85060 BLOOD SMEAR INTERPRETATION: CPT | Performed by: EMERGENCY MEDICINE

## 2024-01-13 PROCEDURE — 85025 COMPLETE CBC W/AUTO DIFF WBC: CPT | Performed by: EMERGENCY MEDICINE

## 2024-01-13 PROCEDURE — 83880 ASSAY OF NATRIURETIC PEPTIDE: CPT | Performed by: EMERGENCY MEDICINE

## 2024-01-13 PROCEDURE — 96374 THER/PROPH/DIAG INJ IV PUSH: CPT

## 2024-01-13 PROCEDURE — 84484 ASSAY OF TROPONIN QUANT: CPT | Performed by: EMERGENCY MEDICINE

## 2024-01-13 PROCEDURE — 93010 ELECTROCARDIOGRAM REPORT: CPT

## 2024-01-13 PROCEDURE — 93005 ELECTROCARDIOGRAM TRACING: CPT

## 2024-01-13 PROCEDURE — 99285 EMERGENCY DEPT VISIT HI MDM: CPT

## 2024-01-13 RX ORDER — FUROSEMIDE 10 MG/ML
40 INJECTION INTRAMUSCULAR; INTRAVENOUS ONCE
Status: COMPLETED | OUTPATIENT
Start: 2024-01-13 | End: 2024-01-13

## 2024-01-13 RX ORDER — FUROSEMIDE 20 MG/1
20 TABLET ORAL 2 TIMES DAILY
Qty: 4 TABLET | Refills: 0 | Status: SHIPPED | OUTPATIENT
Start: 2024-01-13 | End: 2024-01-15

## 2024-01-13 NOTE — ED QUICK NOTES
MD cleared patient for discharge. Patient provided with discharge paperwork and RN discussed plan of care. Patient given opportunity to ask any questions. MD prescribed 1 medication. Patient was in no apparent distress. Patient instructed to follow up with PCP and Cardiologist. Patient pushed out of ED by PCT in wheelchair

## 2024-01-13 NOTE — ED INITIAL ASSESSMENT (HPI)
Pt to ED A&O x 4 accompanied by daughter for c/o: BLE edema, increased fatigue, generalized weakness, and BLE edema x 2 weeks.  Pt w/ hx of CHF.  Pt also reporting increased CASTRO.  Pt denies any chest pain.

## 2024-01-13 NOTE — ED PROVIDER NOTES
Patient Seen in: [facility] Emergency Department    History     Chief Complaint   Patient presents with    Difficulty Breathing    Leg Swelling       HPI    History is provided by patient/independent historian: patient, patient's daughter  88-year-old male with history of CHF, on Lasix 20 mg daily, CAD, diabetes, hypertension, here with complaints of difficulty breathing, dyspnea with exertion, leg swelling over the past 2 weeks.  Daughter reports initially thinking it was the flu because he was initially only complaining of generalized weakness which has been stable since then.  But she also noticed that he could not use complete sentences which is new for him.  They have a follow-up appointment with the nurse practitioner for cardiology in 2 days but she wanted to bring him in for further evaluation.  Patient denies any chest pain.    History reviewed.   Past Medical History:   Diagnosis Date    Acute congestive heart failure, unspecified heart failure type (HCC) 09/26/2021    Coronary atherosclerosis     Diabetes (HCC)     Essential hypertension     Former smoker     High blood pressure     History of quadruple bypass     Pressure ulcer 02/08/2022         History reviewed.   Past Surgical History:   Procedure Laterality Date    CABG  05/2009    CAROTID ENDARTERECTOMY      COLONOSCOPY      OTHER SURGICAL HISTORY           Home Medications reviewed :  (Not in a hospital admission)        History reviewed.   Social History     Socioeconomic History    Marital status:    Tobacco Use    Smoking status: Former    Smokeless tobacco: Never   Vaping Use    Vaping Use: Never used   Substance and Sexual Activity    Alcohol use: Never    Drug use: Never    Sexual activity: Not Currently     Partners: Female         ROS  Review of Systems   Constitutional:  Positive for fatigue.   Respiratory:  Positive for shortness of breath.    Cardiovascular:  Positive for leg swelling. Negative for chest pain.   All other  systems reviewed and are negative.     All other pertinent organ systems are reviewed and are negative.      Physical Exam     ED Triage Vitals [01/13/24 1216]   /70   Pulse 75   Resp 16   Temp 97.9 °F (36.6 °C)   Temp src Oral   SpO2 96 %   O2 Device None (Room air)     Vital signs reviewed.      Physical Exam  Vitals and nursing note reviewed.   Cardiovascular:      Pulses: Normal pulses.      Comments: Mild pedal edema   Pulmonary:      Effort: No respiratory distress.   Abdominal:      General: There is no distension.   Neurological:      Mental Status: He is alert.         ED Course       Labs:     Labs Reviewed   BASIC METABOLIC PANEL (8) - Abnormal; Notable for the following components:       Result Value    Glucose 243 (*)     BUN/CREA Ratio 20.2 (*)     Calculated Osmolality 299 (*)     All other components within normal limits   BNP (B TYPE NATRIURETIC PEPTIDE) - Abnormal; Notable for the following components:    Beta Natriuretic Peptide 1,681 (*)     All other components within normal limits   CBC W/ DIFFERENTIAL - Abnormal; Notable for the following components:    HGB 10.4 (*)     HCT 32.3 (*)     RDW-SD 47.3 (*)     RDW 15.9 (*)     Lymphocyte Absolute 0.78 (*)     Eosinophil Absolute 1.23 (*)     All other components within normal limits   TROPONIN I HIGH SENSITIVITY - Normal   CBC WITH DIFFERENTIAL WITH PLATELET    Narrative:     The following orders were created for panel order CBC With Differential With Platelet.                  Procedure                               Abnormality         Status                                     ---------                               -----------         ------                                     CBC W/ DIFFERENTIAL[[id number]]          Abnormal            Final result                                                 Please view results for these tests on the individual orders.   SCAN SLIDE   MD BLOOD SMEAR CONSULT         My EKG Interpretation: EKG    Rate,  intervals and axes as noted on EKG Report.  Rate: 67  Rhythm: Sinus Rhythm with PVC, LAFB  Reading: normal for rate, abnormal for rhythm, no acute ST changes           As reviewed and Interpreted by me      Imaging Results Available and Reviewed while in ED:   XR CHEST AP PORTABLE  (CPT=71045)    Result Date: 1/13/2024  CONCLUSION:  1. CHF/fluid overload with pulmonary interstitial edema.    Dictated by (CST): Edward Peleaz MD on 1/13/2024 at 2:52 PM     Finalized by (CST): Edward Pelaez MD on 1/13/2024 at 2:53 PM         My review and independent interpretation of XR images: fluid overload. Radiology report corroborates this in addition to other details as reported by them.      Decision rules/scores evaluated: none      Diagnostic labs/tests considered but not ordered: none    ED Medications Administered:   Medications   furosemide (Lasix) 10 mg/mL injection 40 mg (40 mg Intravenous Given 1/13/24 1512)            - pt satting 93% on RA - comfortably - discussed temporarily increasing lasix until f/u with APRN for cardiology- daughter and pt comfortable with plan    MDM       Medical Decision Making      Differential Diagnosis: After obtaining the patient's history, performing the physical exam and reviewing the diagnostics, multiple initial diagnoses were considered based on the presenting problem including fluid overload, aortic stenosis, ACS, pneumonia, YARI    External document review: I personally reviewed available external medical records for any recent pertinent discharge summaries, testing, and procedures - the findings are as follows: 11/27/23 visit with Dr. Rodriguez for DM    Complicating Factors: The patient already  has a past medical history of Acute congestive heart failure, unspecified heart failure type (HCC) (09/26/2021), Coronary atherosclerosis, Diabetes (HCC), Essential hypertension, Former smoker, High blood pressure, History of quadruple bypass, and Pressure ulcer (02/08/2022). to contribute  to the complexity of this ED evaluation.    Procedures performed: none    Discussed management with physician/appropriate source: none    Considered admission/deescalation of care for: shortness of breath    Social determinants of health affecting patient care: none    Prescription medications considered: lasix, discussed continuing current medication regimen    The patient requires continuous monitoring for: shortness of breath    Shared decision making: discussed possible admission          Disposition and Plan     Clinical Impression:  1. Acute on chronic congestive heart failure, unspecified heart failure type (HCC)        Disposition:  Discharge    Follow-up:  Priyank Rodriguez MD  [address]  [facility] [address]  [phone]    Follow up        Medications Prescribed:  Current Discharge Medication List        START taking these medications    Details   !! furosemide 20 MG Oral Tab Take 1 tablet (20 mg total) by mouth 2 (two) times daily for 4 doses.  Qty: 4 tablet, Refills: 0       !! - Potential duplicate medications found. Please discuss with provider.

## 2024-01-14 LAB
ATRIAL RATE: 67 BPM
P AXIS: 92 DEGREES
P-R INTERVAL: 228 MS
Q-T INTERVAL: 464 MS
QRS DURATION: 134 MS
QTC CALCULATION (BEZET): 490 MS
R AXIS: -64 DEGREES
T AXIS: 89 DEGREES
VENTRICULAR RATE: 67 BPM

## 2024-01-15 ENCOUNTER — LAB ENCOUNTER (OUTPATIENT)
Dept: LAB | Facility: HOSPITAL | Age: 89
End: 2024-01-15
Attending: NURSE PRACTITIONER
Payer: MEDICARE

## 2024-01-15 DIAGNOSIS — I50.31 ACUTE DIASTOLIC HEART FAILURE (HCC): Primary | ICD-10-CM

## 2024-01-15 DIAGNOSIS — E78.49 FAMILIAL COMBINED HYPERLIPIDEMIA: ICD-10-CM

## 2024-01-15 DIAGNOSIS — I65.23 BILATERAL CAROTID ARTERY STENOSIS: ICD-10-CM

## 2024-01-15 DIAGNOSIS — D64.9 ANEMIA, UNSPECIFIED: ICD-10-CM

## 2024-01-15 LAB
ANION GAP SERPL CALC-SCNC: 7 MMOL/L (ref 0–18)
BUN BLD-MCNC: 23 MG/DL (ref 9–23)
BUN/CREAT SERPL: 22.1 (ref 10–20)
CALCIUM BLD-MCNC: 9.4 MG/DL (ref 8.7–10.4)
CHLORIDE SERPL-SCNC: 106 MMOL/L (ref 98–112)
CO2 SERPL-SCNC: 26 MMOL/L (ref 21–32)
CREAT BLD-MCNC: 1.04 MG/DL
EGFRCR SERPLBLD CKD-EPI 2021: 70 ML/MIN/1.73M2 (ref 60–?)
FASTING STATUS PATIENT QL REPORTED: YES
GLUCOSE BLD-MCNC: 161 MG/DL (ref 70–99)
OSMOLALITY SERPL CALC.SUM OF ELEC: 295 MOSM/KG (ref 275–295)
POTASSIUM SERPL-SCNC: 3.9 MMOL/L (ref 3.5–5.1)
SODIUM SERPL-SCNC: 139 MMOL/L (ref 136–145)

## 2024-01-15 PROCEDURE — 36415 COLL VENOUS BLD VENIPUNCTURE: CPT

## 2024-01-15 PROCEDURE — 80048 BASIC METABOLIC PNL TOTAL CA: CPT

## 2024-01-17 ENCOUNTER — PATIENT OUTREACH (OUTPATIENT)
Dept: CASE MANAGEMENT | Age: 89
End: 2024-01-17

## 2024-01-17 RX ORDER — ALLOPURINOL 300 MG/1
300 TABLET ORAL DAILY
Qty: 90 TABLET | Refills: 3 | Status: SHIPPED | OUTPATIENT
Start: 2024-01-17

## 2024-01-17 NOTE — PROGRESS NOTES
1st attempt ER f/up apt request    Priyank Rodriguez  PCP  [address]  [address]  [place] [address]  [phone]  Apt: Feb 26 @3:20pm     Confirmed w/ pt  Closing encounter

## 2024-01-17 NOTE — TELEPHONE ENCOUNTER
Pt nearly out of Rx:    allopurinol 300 MG Oral Tab     Send to verified pharmacy:  [facility] [address] in [place]

## 2024-01-17 NOTE — TELEPHONE ENCOUNTER
Please review; protocol failed/ has no protocol      Felicita Garza2 hours ago (10:12 AM)       Pt nearly out of Rx:     allopurinol 300 MG Oral Tab      [facility] [address] [facility] [place]        Requested Prescriptions   Pending Prescriptions Disp Refills    allopurinol 300 MG Oral Tab 90 tablet 3     Sig: Take 1 tablet (300 mg total) by mouth daily.       There is no refill protocol information for this order        Recent Outpatient Visits              1 month ago Type 2 diabetes mellitus without complication, without long-term current use of insulin (HCC)    [facility] Priyank Rodriguez MD    Office Visit    3 months ago     [facility] Wanda Strong, PT    Office Visit    3 months ago     [facility] Wanda Strong, PT    Office Visit    4 months ago     [facility]    Office Visit    4 months [facility]    Office Visit          Future Appointments         Provider Department Appt Notes    In 1 month Priyank Rodriguez MD [facility] Return in about 3 months (around 2/27/2024).

## 2024-02-06 ENCOUNTER — LAB ENCOUNTER (OUTPATIENT)
Dept: LAB | Facility: HOSPITAL | Age: 89
End: 2024-02-06
Attending: INTERNAL MEDICINE
Payer: MEDICARE

## 2024-02-06 DIAGNOSIS — I10 PRIMARY HYPERTENSION: ICD-10-CM

## 2024-02-06 DIAGNOSIS — D64.9 ANEMIA, UNSPECIFIED: Primary | ICD-10-CM

## 2024-02-06 DIAGNOSIS — I65.23 CAROTID STENOSIS, BILATERAL: ICD-10-CM

## 2024-02-06 DIAGNOSIS — Z95.1 POSTSURGICAL AORTOCORONARY BYPASS STATUS: ICD-10-CM

## 2024-02-06 DIAGNOSIS — E78.5 HYPERLIPIDEMIA: ICD-10-CM

## 2024-02-06 LAB
ANION GAP SERPL CALC-SCNC: 8 MMOL/L (ref 0–18)
BUN BLD-MCNC: 37 MG/DL (ref 9–23)
BUN/CREAT SERPL: 29.1 (ref 10–20)
CALCIUM BLD-MCNC: 9.8 MG/DL (ref 8.7–10.4)
CHLORIDE SERPL-SCNC: 111 MMOL/L (ref 98–112)
CO2 SERPL-SCNC: 22 MMOL/L (ref 21–32)
CREAT BLD-MCNC: 1.27 MG/DL
EGFRCR SERPLBLD CKD-EPI 2021: 55 ML/MIN/1.73M2 (ref 60–?)
FASTING STATUS PATIENT QL REPORTED: NO
GLUCOSE BLD-MCNC: 135 MG/DL (ref 70–99)
OSMOLALITY SERPL CALC.SUM OF ELEC: 303 MOSM/KG (ref 275–295)
POTASSIUM SERPL-SCNC: 5.5 MMOL/L (ref 3.5–5.1)
SODIUM SERPL-SCNC: 141 MMOL/L (ref 136–145)

## 2024-02-06 PROCEDURE — 80048 BASIC METABOLIC PNL TOTAL CA: CPT

## 2024-02-06 PROCEDURE — 36415 COLL VENOUS BLD VENIPUNCTURE: CPT

## 2024-02-13 ENCOUNTER — LAB ENCOUNTER (OUTPATIENT)
Dept: LAB | Facility: HOSPITAL | Age: 89
End: 2024-02-13
Attending: INTERNAL MEDICINE
Payer: MEDICARE

## 2024-02-13 DIAGNOSIS — I10 HYPERTENSION: Primary | ICD-10-CM

## 2024-02-13 LAB
ANION GAP SERPL CALC-SCNC: 6 MMOL/L (ref 0–18)
BUN BLD-MCNC: 37 MG/DL (ref 9–23)
BUN/CREAT SERPL: 29.1 (ref 10–20)
CALCIUM BLD-MCNC: 10 MG/DL (ref 8.7–10.4)
CHLORIDE SERPL-SCNC: 110 MMOL/L (ref 98–112)
CO2 SERPL-SCNC: 22 MMOL/L (ref 21–32)
CREAT BLD-MCNC: 1.27 MG/DL
EGFRCR SERPLBLD CKD-EPI 2021: 55 ML/MIN/1.73M2 (ref 60–?)
FASTING STATUS PATIENT QL REPORTED: NO
GLUCOSE BLD-MCNC: 222 MG/DL (ref 70–99)
OSMOLALITY SERPL CALC.SUM OF ELEC: 302 MOSM/KG (ref 275–295)
POTASSIUM SERPL-SCNC: 4.6 MMOL/L (ref 3.5–5.1)
SODIUM SERPL-SCNC: 138 MMOL/L (ref 136–145)

## 2024-02-13 PROCEDURE — 80048 BASIC METABOLIC PNL TOTAL CA: CPT

## 2024-02-13 PROCEDURE — 36415 COLL VENOUS BLD VENIPUNCTURE: CPT

## 2024-02-26 ENCOUNTER — OFFICE VISIT (OUTPATIENT)
Facility: CLINIC | Age: 89
End: 2024-02-26

## 2024-02-26 VITALS
HEIGHT: 60 IN | HEART RATE: 72 BPM | DIASTOLIC BLOOD PRESSURE: 58 MMHG | WEIGHT: 103 LBS | TEMPERATURE: 97 F | SYSTOLIC BLOOD PRESSURE: 124 MMHG | BODY MASS INDEX: 20.22 KG/M2 | RESPIRATION RATE: 20 BRPM

## 2024-02-26 DIAGNOSIS — E11.9 TYPE 2 DIABETES MELLITUS WITHOUT COMPLICATION, WITHOUT LONG-TERM CURRENT USE OF INSULIN (HCC): ICD-10-CM

## 2024-02-26 DIAGNOSIS — I10 ESSENTIAL HYPERTENSION: ICD-10-CM

## 2024-02-26 DIAGNOSIS — I50.30 DIASTOLIC CONGESTIVE HEART FAILURE, UNSPECIFIED HF CHRONICITY (HCC): Primary | ICD-10-CM

## 2024-02-26 DIAGNOSIS — I25.119 ATHEROSCLEROSIS OF NATIVE CORONARY ARTERY OF NATIVE HEART WITH ANGINA PECTORIS (HCC): ICD-10-CM

## 2024-02-26 DIAGNOSIS — M54.50 BILATERAL LOW BACK PAIN WITHOUT SCIATICA, UNSPECIFIED CHRONICITY: ICD-10-CM

## 2024-02-26 PROCEDURE — 3078F DIAST BP <80 MM HG: CPT | Performed by: INTERNAL MEDICINE

## 2024-02-26 PROCEDURE — 3074F SYST BP LT 130 MM HG: CPT | Performed by: INTERNAL MEDICINE

## 2024-02-26 PROCEDURE — 1160F RVW MEDS BY RX/DR IN RCRD: CPT | Performed by: INTERNAL MEDICINE

## 2024-02-26 PROCEDURE — 3008F BODY MASS INDEX DOCD: CPT | Performed by: INTERNAL MEDICINE

## 2024-02-26 PROCEDURE — 99214 OFFICE O/P EST MOD 30 MIN: CPT | Performed by: INTERNAL MEDICINE

## 2024-02-26 PROCEDURE — 1170F FXNL STATUS ASSESSED: CPT | Performed by: INTERNAL MEDICINE

## 2024-02-26 PROCEDURE — 1126F AMNT PAIN NOTED NONE PRSNT: CPT | Performed by: INTERNAL MEDICINE

## 2024-02-26 PROCEDURE — 1159F MED LIST DOCD IN RCRD: CPT | Performed by: INTERNAL MEDICINE

## 2024-02-26 RX ORDER — LOSARTAN POTASSIUM 50 MG/1
50 TABLET ORAL DAILY
Qty: 90 TABLET | Refills: 1 | Status: SHIPPED | OUTPATIENT
Start: 2024-02-26

## 2024-02-26 NOTE — PROGRESS NOTES
HPI:    Patient ID: Mohamud Fitzpatrick is a 88 year old male.    HPI    Patient is here for follow-up on recent emergency room visit.  He was seen in the office last November 27 of last year.  Blood pressure little bit elevated.  He was in the emergency room on January 13 presenting with shortness of breath dyspnea on exertion and increasing edema.  Markedly elevated BNP at greater than 1000.  Chest x-ray showed volume overload.  Placed on furosemide.  Current medications reviewed.    Right now, he feels ok. Cardiology decreased losartan from 100 to 50. Also decreased dose of Lasix from 20 to 10; swelling increased so he went back to the 20mg dose. Patient does check blood pressure at home. It has been running 108-136/46-70. Patient does check sugar at home. It has been running 77-94. Feeling back to normal. Now. Not weighing himself.     Also with low back pain. Hx of PT that was not very helpful. He has a new bed with a lift function.       Patient Active Problem List   Diagnosis    Type 2 diabetes mellitus without complication, without long-term current use of insulin (HCC)    Coronary artery disease involving native coronary artery of native heart without angina pectoris    Essential hypertension    Gastroesophageal reflux disease    Bilateral carotid artery stenosis    RBBB (right bundle branch block)    Primary cerebellar degeneration (HCC)    Other iron deficiency anemia          HISTORY:  Past Medical History:   Diagnosis Date    Acute congestive heart failure, unspecified heart failure type (HCC) 09/26/2021    Coronary atherosclerosis     Diabetes (HCC)     Essential hypertension     Former smoker     High blood pressure     History of quadruple bypass     Pressure ulcer 02/08/2022      Past Surgical History:   Procedure Laterality Date    CABG  05/2009    CAROTID ENDARTERECTOMY      COLONOSCOPY      OTHER SURGICAL HISTORY        Family History   Problem Relation Age of Onset    No Known Problems  Father     Other (Other) Mother     Heart Disorder Brother       Social History     Socioeconomic History    Marital status:    Tobacco Use    Smoking status: Former    Smokeless tobacco: Never   Vaping Use    Vaping Use: Never used   Substance and Sexual Activity    Alcohol use: Never    Drug use: Never    Sexual activity: Not Currently     Partners: Female          Review of Systems          Current Outpatient Medications   Medication Sig Dispense Refill    allopurinol 300 MG Oral Tab Take 1 tablet (300 mg total) by mouth daily. 90 tablet 3    clopidogrel 75 MG Oral Tab Take 1 tablet (75 mg total) by mouth daily. 90 tablet 1    ferrous sulfate 325 (65 FE) MG Oral Tab EC Take 1 tablet (325 mg total) by mouth 3 (three) times a week. 39 tablet 1    amLODIPine 10 MG Oral Tab Take 1 tablet (10 mg total) by mouth daily. 90 tablet 3    Neomycin-Polymyxin-Dexameth 3.5-19324-8.1 Ophthalmic Suspension INSTILL 1 DROP IN BOTH EYES THREE TIMES A DAY      glipiZIDE-metFORMIN HCl 2.5-500 MG Oral Tab Take 1 tablet by mouth 2 (two) times daily with meals. 180 tablet 3    terazosin 2 MG Oral Cap Take 1 capsule (2 mg total) by mouth nightly. 90 capsule 1    Glucose Blood (ACCU-CHEK SMARTVIEW) In Vitro Strip TEST BLOOD SUGAR TWO TIMES DAILY 200 strip 3    losartan 100 MG Oral Tab Take 1 tablet (100 mg total) by mouth daily. (Patient taking differently: Take 0.5 tablets (50 mg total) by mouth daily.) 90 tablet 1    furosemide 20 MG Oral Tab Take 1 tablet (20 mg total) by mouth daily. (Patient taking differently: Take 0.5 tablets (10 mg total) by mouth daily.) 90 tablet 3    acetaminophen-codeine 300-30 MG Oral Tab Take 1 tablet by mouth every 4 (four) hours as needed for Pain. 30 tablet 0    TRUEplus Lancets 33G Does not apply Misc Use to check glucose twice a day 200 each 3    STIMULANT LAXATIVE 8.6-50 MG Oral Tab Take 1 tablet by mouth daily. 90 tablet 3    metoprolol succinate  MG Oral Tablet 24 Hr Take 1.5 tablets  (150 mg total) by mouth daily. 135 tablet 3    triamcinolone 0.1 % External Cream Apply topically 2 (two) times daily as needed. 45 g 3    cyanocobalamine 1000 MCG Oral Tab Take 1 tablet (1,000 mcg total) by mouth daily. 30 tablet 0    atorvastatin 40 MG Oral Tab Take 1 tablet (40 mg total) by mouth nightly.      Glucose Blood In Vitro Strip Check glucose once daily      Dorzolamide HCl-Timolol Mal 22.3-6.8 MG/ML Ophthalmic Solution Place 1 drop into both eyes 2 (two) times daily.      LUMIGAN 0.01 % Ophthalmic Solution Place 1 drop into both eyes every evening.       Allergies:No Known Allergies     PHYSICAL EXAM:   /58 (BP Location: Left arm, Patient Position: Sitting, Cuff Size: large)   Pulse 72   Temp 97.2 °F (36.2 °C) (Other)   Resp 20   Ht 4' 10.5\" (1.486 m)   Wt 103 lb (46.7 kg)   BMI 21.16 kg/m²      Physical Exam  Constitutional:       Appearance: Normal appearance. He is well-developed.   HENT:      Nose: Nose normal.      Mouth/Throat:      Pharynx: No oropharyngeal exudate or posterior oropharyngeal erythema.   Eyes:      Conjunctiva/sclera: Conjunctivae normal.   Neck:      Vascular: No carotid bruit.   Cardiovascular:      Rate and Rhythm: Normal rate and regular rhythm.      Pulses: Normal pulses.      Heart sounds: Murmur heard.      Comments: Frequent PVC's  Pulmonary:      Effort: Pulmonary effort is normal.      Breath sounds: Normal breath sounds. No wheezing or rales.   Abdominal:      General: Bowel sounds are normal.      Palpations: Abdomen is soft. There is no mass.      Tenderness: There is no abdominal tenderness.   Musculoskeletal:      Right lower leg: No edema.      Left lower leg: No edema.   Lymphadenopathy:      Cervical: No cervical adenopathy.   Skin:     General: Skin is warm and dry.      Findings: No rash.   Neurological:      General: No focal deficit present.      Mental Status: He is alert.   Psychiatric:         Mood and Affect: Mood normal.         Behavior:  Behavior normal.         Thought Content: Thought content normal.          Wt Readings from Last 6 Encounters:   02/26/24 103 lb (46.7 kg)   01/13/24 110 lb (49.9 kg)   11/27/23 108 lb (49 kg)   08/14/23 110 lb (49.9 kg)   07/18/23 110 lb (49.9 kg)   06/22/23 110 lb (49.9 kg)             ASSESSMENT/PLAN:   1. Diastolic congestive heart failure, unspecified HF chronicity (HCC)     Patient with recent emergency room visit about a month month and a half ago.  Had flare of congestive heart failure.  Doing much better now on the furosemide.  On furosemide 20 mg a day.  Losartan 50 mg a day.  Continue current dosing.  Cut back on salt intake.  Follow-up in about 3 months.    2. Type 2 diabetes mellitus without complication, without long-term current use of insulin ([facility])     Stable.  Continue current treatment.    check A1c at next visit.    3. Essential hypertension    Well-controlled.  Continue current treatment.    4. Atherosclerosis of native coronary artery of native heart with angina pectoris ([facility])     No chest pain.  Monitor.    5. Bilateral low back pain without sciatica, unspecified chronicity     Patient with flareup of low back pain.  Causing considerable debility.  Patient repeatedly stated he did not want to [facility] because he felt a were not helpful the last time he did this.  Felt he did not spend enough time.  Given referral to external physical therapy.  Advised that insurance may not cover it.  - Physical Therapy Referral - External         Meds This Visit:  Requested Prescriptions      No prescriptions requested or ordered in this encounter       Imaging & Referrals:  None         Priyank Rodriguez MD

## 2024-02-27 ENCOUNTER — TELEPHONE (OUTPATIENT)
Dept: INTERNAL MEDICINE CLINIC | Facility: CLINIC | Age: 89
End: 2024-02-27

## 2024-02-27 RX ORDER — DOCUSATE SODIUM 50 MG AND SENNOSIDES 8.6 MG 8.6; 5 MG/1; MG/1
1 TABLET, FILM COATED ORAL DAILY
Qty: 90 TABLET | Refills: 0 | Status: SHIPPED | OUTPATIENT
Start: 2024-02-27

## 2024-03-15 ENCOUNTER — TELEPHONE (OUTPATIENT)
Dept: INTERNAL MEDICINE CLINIC | Facility: CLINIC | Age: 89
End: 2024-03-15

## 2024-03-15 NOTE — TELEPHONE ENCOUNTER
Patient calling this morning, indicates that medication dispensed from the pharmacy looks different and has a different name on it than what he was given previously. Patient med list reviewed, asked what patient is asking for and he says Stimulant which is what was sent.     Patient is going to call the pharmacy and talk with them about this, see if similar med or same ingredients - explained possible different  than before. Patient says what he was given is not as effective but he will call them to discuss and if we need to send something more or call pharmacy, patient will call us back and let us know.

## 2024-03-18 RX ORDER — METOPROLOL SUCCINATE 100 MG/1
150 TABLET, EXTENDED RELEASE ORAL DAILY
Qty: 135 TABLET | Refills: 1 | Status: SHIPPED | OUTPATIENT
Start: 2024-03-18

## 2024-03-18 NOTE — TELEPHONE ENCOUNTER
Refill passed per protocol.    Requested Prescriptions   Pending Prescriptions Disp Refills    METOPROLOL SUCCINATE  MG Oral Tablet 24 Hr [Pharmacy Med Name: METOPROLOL SUCC  MG TAB] 135 tablet 1     Sig: TAKE 1 AND 1/2 TABLETS BY MOUTH DAILY       Hypertension Medications Protocol Passed - 3/16/2024 12:39 AM        Passed - CMP or BMP in past 12 months        Passed - Last BP reading less than 140/90     BP Readings from Last 1 Encounters:   02/26/24 124/58               Passed - In person appointment or virtual visit in the past 12 mos or appointment in next 3 mos     Recent Outpatient Visits              3 weeks ago Diastolic congestive heart failure, unspecified HF chronicity ([facility])    [facility] Priyank Rodriguez MD    Office Visit    3 months ago Type 2 diabetes mellitus without complication, without long-term current use of insulin ([facility])    [facility]    Office Visit    5 months ago     [facility] Wanda Strong, PT    Office Visit    5 months ago     [facility]    Office Visit    6 months ago     [facility]    Office Visit          Future Appointments         Provider Department Appt Notes    In 2 months Priyank Rodriguez MD [facility] 3 month follow-up               Passed - EGFRCR or GFRNAA > 50     GFR Evaluation  EGFRCR: 55 , resulted on 2/13/2024               Future Appointments         Provider Department Appt Notes    In 2 months Priyank Rodriguez MD [facility] 3 month follow-up          Recent Outpatient Visits              3 weeks ago Diastolic congestive heart failure, unspecified HF chronicity ([facility])    [facility] Priyank Rodriguez MD     Office Visit    3 months ago Type 2 diabetes mellitus without complication, without long-term current use of insulin (HCC)    [facility] Priyank Rodriguez MD    Office Visit    5 months ago     [facility] Wanda Strong, PT    Office Visit    5 months ago     [facility] Wanda Strong, PT    Office Visit    6 months ago     [facility] Wanda Strong, PT    Office Visit

## 2024-05-01 RX ORDER — TERAZOSIN 2 MG/1
2 CAPSULE ORAL NIGHTLY
Qty: 90 CAPSULE | Refills: 3 | Status: SHIPPED | OUTPATIENT
Start: 2024-05-01

## 2024-05-01 NOTE — TELEPHONE ENCOUNTER
Refill passed per [facility] protocol.  Requested Prescriptions   Pending Prescriptions Disp Refills    TERAZOSIN 2 MG Oral Cap [Pharmacy Med Name: TERAZOSIN 2 MG CAPSULE] 90 capsule 1     Sig: TAKE 1 CAPSULE BY MOUTH EVERY DAY AT NIGHT       Hypertension Medications Protocol Passed - 5/1/2024 10:30 AM        Passed - CMP or BMP in past 12 months        Passed - Last BP reading less than 140/90     BP Readings from Last 1 Encounters:   02/26/24 124/58               Passed - In person appointment or virtual visit in the past 12 mos or appointment in next 3 mos     Recent Outpatient Visits              2 months ago Diastolic congestive heart failure, unspecified HF chronicity ([facility])    [facility] Priyank Rodriguez MD    Office Visit    5 months ago Type 2 diabetes mellitus without complication, without long-term current use of insulin ([facility])    [facility] Priyank Rodriguez MD    Office Visit    6 months ago     [facility] Wanda Strong, PT    Office Visit    7 months ago     [facility]    Office Visit    7 months [facility]    Office Visit          Future Appointments         Provider Department Appt Notes    In 1 month Priyank Rodriguez MD [facility] 3 month follow-up                    Passed - EGFRCR or GFRNAA > 50     GFR Evaluation  EGFRCR: 55 , resulted on 2/13/2024             Recent Outpatient Visits              2 months ago Diastolic congestive heart failure, unspecified HF chronicity ([facility])    [facility] Priyank Rodriguez MD    Office Visit    5 months ago Type 2 diabetes mellitus without complication, without long-term current use of insulin ([facility])    [facility]  Priyank Rodriguez MD    Office Visit    6 months ago     [facility] Wanda Strong, PT    Office Visit    7 months ago     [facility] Wanda Strong, PT    Office Visit    7 months ago     [facility] Wanda Strong, PT    Office Visit          Future Appointments         Provider Department Appt Notes    In 1 month Priyank Rodriguez MD [facility] 3 month follow-up

## 2024-06-03 ENCOUNTER — OFFICE VISIT (OUTPATIENT)
Facility: CLINIC | Age: 89
End: 2024-06-03

## 2024-06-03 VITALS
DIASTOLIC BLOOD PRESSURE: 56 MMHG | OXYGEN SATURATION: 96 % | HEART RATE: 67 BPM | HEIGHT: 60 IN | WEIGHT: 103 LBS | BODY MASS INDEX: 20.22 KG/M2 | SYSTOLIC BLOOD PRESSURE: 128 MMHG

## 2024-06-03 DIAGNOSIS — I25.119 ATHEROSCLEROSIS OF NATIVE CORONARY ARTERY OF NATIVE HEART WITH ANGINA PECTORIS (HCC): ICD-10-CM

## 2024-06-03 DIAGNOSIS — E11.9 TYPE 2 DIABETES MELLITUS WITHOUT COMPLICATION, WITHOUT LONG-TERM CURRENT USE OF INSULIN (HCC): Primary | ICD-10-CM

## 2024-06-03 DIAGNOSIS — I50.30 DIASTOLIC CONGESTIVE HEART FAILURE, UNSPECIFIED HF CHRONICITY (HCC): ICD-10-CM

## 2024-06-03 DIAGNOSIS — G11.9 PRIMARY CEREBELLAR DEGENERATION (HCC): ICD-10-CM

## 2024-06-03 DIAGNOSIS — R05.9 COUGH, UNSPECIFIED TYPE: ICD-10-CM

## 2024-06-03 DIAGNOSIS — I10 ESSENTIAL HYPERTENSION: ICD-10-CM

## 2024-06-03 DIAGNOSIS — I65.23 BILATERAL CAROTID ARTERY STENOSIS: ICD-10-CM

## 2024-06-03 PROCEDURE — 3074F SYST BP LT 130 MM HG: CPT | Performed by: INTERNAL MEDICINE

## 2024-06-03 PROCEDURE — 3008F BODY MASS INDEX DOCD: CPT | Performed by: INTERNAL MEDICINE

## 2024-06-03 PROCEDURE — 1126F AMNT PAIN NOTED NONE PRSNT: CPT | Performed by: INTERNAL MEDICINE

## 2024-06-03 PROCEDURE — 99214 OFFICE O/P EST MOD 30 MIN: CPT | Performed by: INTERNAL MEDICINE

## 2024-06-03 PROCEDURE — 1159F MED LIST DOCD IN RCRD: CPT | Performed by: INTERNAL MEDICINE

## 2024-06-03 PROCEDURE — 3078F DIAST BP <80 MM HG: CPT | Performed by: INTERNAL MEDICINE

## 2024-06-03 PROCEDURE — 1160F RVW MEDS BY RX/DR IN RCRD: CPT | Performed by: INTERNAL MEDICINE

## 2024-06-03 RX ORDER — DOCUSATE SODIUM 50 MG AND SENNOSIDES 8.6 MG 8.6; 5 MG/1; MG/1
1 TABLET, FILM COATED ORAL DAILY
Qty: 90 TABLET | Refills: 3 | Status: SHIPPED | OUTPATIENT
Start: 2024-06-03

## 2024-06-03 NOTE — PROGRESS NOTES
HPI:    Patient ID: Mohamud Fitzpatrick is a 89 year old male.    HPI  Patient is here for follow-up on chronic medical issues as listed below.  Last seen in the office February 26 following emergency room visit for congestive heart failure with diastolic dysfunction.  He was stable at that time.  No changes were made.  Current medications reviewed.  Health maintenance and vaccination status reviewed.  Patient is due for full blood work.     PT saw Cardiology doctor since the last visit. Patient does check blood pressure at home. It has been running around 112-123/59-74. Has some thick phlegm that he coughs up daily; it is yellow and thick. No fever or chells. No sinus issues. No SOB or wheezing. Patient does check sugar at home. It has been running around . Diet is limited due to teeth; he mostly eats soups. No gout attacks.       Patient Active Problem List   Diagnosis    Type 2 diabetes mellitus without complication, without long-term current use of insulin (HCC)    Coronary artery disease involving native coronary artery of native heart without angina pectoris    Essential hypertension    Gastroesophageal reflux disease    Bilateral carotid artery stenosis    RBBB (right bundle branch block)    Primary cerebellar degeneration (HCC)    Other iron deficiency anemia          HISTORY:  Past Medical History:    Acute congestive heart failure, unspecified heart failure type (HCC)    Coronary atherosclerosis    Diabetes (HCC)    Essential hypertension    Former smoker    High blood pressure    History of quadruple bypass    Pressure ulcer      Past Surgical History:   Procedure Laterality Date    Cabg  05/2009    Carotid endarterectomy      Colonoscopy      Other surgical history        Family History   Problem Relation Age of Onset    No Known Problems Father     Other (Other) Mother     Heart Disorder Brother       Social History     Socioeconomic History    Marital status:    Tobacco Use    Smoking  status: Former    Smokeless tobacco: Never   Vaping Use    Vaping status: Never Used   Substance and Sexual Activity    Alcohol use: Never    Drug use: Never    Sexual activity: Not Currently     Partners: Female     Social Determinants of Health     Financial Resource Strain: Low Risk  (9/29/2021)    [facility]    Overall Financial Resource Strain (CARDIA)     Difficulty of Paying Living Expenses: Not hard at all   Transportation Needs: No Transportation Needs (9/29/2021)    [facility]    PRAPARE - Transportation     Lack of Transportation (Medical): No     Lack of Transportation (Non-Medical): No          Review of Systems          Current Outpatient Medications   Medication Sig Dispense Refill    terazosin 2 MG Oral Cap Take 1 capsule (2 mg total) by mouth nightly. 90 capsule 3    metoprolol succinate  MG Oral Tablet 24 Hr Take 1.5 tablets (150 mg total) by mouth daily. 135 tablet 1    STIMULANT LAXATIVE 8.6-50 MG Oral Tab Take 1 tablet by mouth daily. 90 tablet 0    losartan 50 MG Oral Tab Take 1 tablet (50 mg total) by mouth daily. 90 tablet 1    allopurinol 300 MG Oral Tab Take 1 tablet (300 mg total) by mouth daily. 90 tablet 3    clopidogrel 75 MG Oral Tab Take 1 tablet (75 mg total) by mouth daily. 90 tablet 1    ferrous sulfate 325 (65 FE) MG Oral Tab EC Take 1 tablet (325 mg total) by mouth 3 (three) times a week. 39 tablet 1    amLODIPine 10 MG Oral Tab Take 1 tablet (10 mg total) by mouth daily. 90 tablet 3    Neomycin-Polymyxin-Dexameth 3.5-14308-4.1 Ophthalmic Suspension INSTILL 1 DROP IN BOTH EYES THREE TIMES A DAY      glipiZIDE-metFORMIN HCl 2.5-500 MG Oral Tab Take 1 tablet by mouth 2 (two) times daily with meals. 180 tablet 3    Glucose Blood (ACCU-CHEK SMARTVIEW) In Vitro Strip TEST BLOOD SUGAR TWO TIMES DAILY 200 strip 3    furosemide 20 MG Oral Tab Take 1 tablet (20 mg total) by mouth daily.  (Patient taking differently: Take 0.5 tablets (10 mg total) by mouth daily.) 90 tablet 3    acetaminophen-codeine 300-30 MG Oral Tab Take 1 tablet by mouth every 4 (four) hours as needed for Pain. 30 tablet 0    TRUEplus Lancets 33G Does not apply Misc Use to check glucose twice a day 200 each 3    triamcinolone 0.1 % External Cream Apply topically 2 (two) times daily as needed. 45 g 3    cyanocobalamine 1000 MCG Oral Tab Take 1 tablet (1,000 mcg total) by mouth daily. 30 tablet 0    atorvastatin 40 MG Oral Tab Take 1 tablet (40 mg total) by mouth nightly.      Glucose Blood In Vitro Strip Check glucose once daily      Dorzolamide HCl-Timolol Mal 22.3-6.8 MG/ML Ophthalmic Solution Place 1 drop into both eyes 2 (two) times daily.      LUMIGAN 0.01 % Ophthalmic Solution Place 1 drop into both eyes every evening.       Allergies:No Known Allergies     PHYSICAL EXAM:   There were no vitals taken for this visit.     Physical Exam  Constitutional:       Appearance: Normal appearance. He is well-developed.   HENT:      Nose: Nose normal.      Mouth/Throat:      Pharynx: No oropharyngeal exudate or posterior oropharyngeal erythema.   Eyes:      Conjunctiva/sclera: Conjunctivae normal.   Neck:      Vascular: No carotid bruit.   Cardiovascular:      Rate and Rhythm: Normal rate and regular rhythm.      Pulses: Normal pulses.      Heart sounds: Normal heart sounds. No murmur heard.  Pulmonary:      Effort: Pulmonary effort is normal.      Breath sounds: Wheezing present. No rales.   Abdominal:      General: Bowel sounds are normal.      Palpations: Abdomen is soft. There is no mass.      Tenderness: There is no abdominal tenderness.   Musculoskeletal:      Right lower leg: No edema.      Left lower leg: No edema.   Lymphadenopathy:      Cervical: No cervical adenopathy.   Skin:     General: Skin is warm and dry.      Findings: No rash.   Neurological:      General: No focal deficit present.      Mental Status: He is alert.    Psychiatric:         Mood and Affect: Mood normal.         Behavior: Behavior normal.         Thought Content: Thought content normal.          Wt Readings from Last 6 Encounters:   02/26/24 103 lb (46.7 kg)   01/13/24 110 lb (49.9 kg)   11/27/23 108 lb (49 kg)   08/14/23 110 lb (49.9 kg)   07/18/23 110 lb (49.9 kg)   06/22/23 110 lb (49.9 kg)             ASSESSMENT/PLAN:   1. Type 2 diabetes mellitus without complication, without long-term current use of insulin (HCC) we will check full blood work and contact patient with results.  Diabetes appears well-controlled at home.  Keep working on diet and exercise.  Adjust medication  As per the test results.  Follow-up in 3 months.  - CBC With Differential With Platelet  - Comp Metabolic Panel (14)  - Lipid Panel  - TSH W Reflex To Free T4  - Microalb/Creat Ratio, Random Urine  - Hemoglobin A1C    2. Essential hypertension  Well-controlled both here and at home.  Continue same medications.    3. Diastolic congestive heart failure, unspecified HF chronicity (HCC)  No evidence of volume overload.  Continue current treatment.  Follow-up with cardiology.    4. Atherosclerosis of native coronary artery of native heart with angina pectoris (HCC)  Asymptomatic.  Follow-up with cardiology.    5. Bilateral carotid artery stenosis  Asymptomatic.  Continue current treatment.    6. Primary cerebellar degeneration (HCC)  Stable from a symptom standpoint.  Continue to monitor.    7. Cough, unspecified type  Patient notes some increasing cough and phlegm production.  To thick yellow sputum.  Check a chest x-ray.  Placed on Mucinex/guaifenesin as needed.  Call if not better.  - XR CHEST PA + LAT CHEST (CPT=71046); Future         Meds This Visit:  Requested Prescriptions      No prescriptions requested or ordered in this encounter       Imaging & Referrals:  None         Priyank Rodriguez MD

## 2024-06-08 ENCOUNTER — HOSPITAL ENCOUNTER (OUTPATIENT)
Dept: GENERAL RADIOLOGY | Facility: HOSPITAL | Age: 89
Discharge: HOME | End: 2024-06-08
Attending: INTERNAL MEDICINE
Payer: MEDICARE

## 2024-06-08 ENCOUNTER — LAB ENCOUNTER (OUTPATIENT)
Dept: LAB | Facility: HOSPITAL | Age: 89
End: 2024-06-08
Attending: INTERNAL MEDICINE
Payer: MEDICARE

## 2024-06-08 DIAGNOSIS — R05.9 COUGH, UNSPECIFIED TYPE: ICD-10-CM

## 2024-06-08 LAB
ALBUMIN SERPL-MCNC: 4.2 G/DL (ref 3.2–4.8)
ALBUMIN/GLOB SERPL: 1.6 {RATIO} (ref 1–2)
ALP LIVER SERPL-CCNC: 82 U/L
ALT SERPL-CCNC: 10 U/L
ANION GAP SERPL CALC-SCNC: 7 MMOL/L (ref 0–18)
AST SERPL-CCNC: 8 U/L (ref ?–34)
BASOPHILS # BLD AUTO: 0.08 X10(3) UL (ref 0–0.2)
BASOPHILS NFR BLD AUTO: 0.8 %
BILIRUB SERPL-MCNC: 0.4 MG/DL (ref 0.2–1.1)
BUN BLD-MCNC: 25 MG/DL (ref 9–23)
BUN/CREAT SERPL: 23.6 (ref 10–20)
CALCIUM BLD-MCNC: 9.8 MG/DL (ref 8.7–10.4)
CHLORIDE SERPL-SCNC: 109 MMOL/L (ref 98–112)
CHOLEST SERPL-MCNC: 93 MG/DL (ref ?–200)
CO2 SERPL-SCNC: 24 MMOL/L (ref 21–32)
CREAT BLD-MCNC: 1.06 MG/DL
CREAT UR-SCNC: 46.9 MG/DL
DEPRECATED RDW RBC AUTO: 43.7 FL (ref 35.1–46.3)
EGFRCR SERPLBLD CKD-EPI 2021: 68 ML/MIN/1.73M2 (ref 60–?)
EOSINOPHIL # BLD AUTO: 2.59 X10(3) UL (ref 0–0.7)
EOSINOPHIL NFR BLD AUTO: 26.2 %
ERYTHROCYTE [DISTWIDTH] IN BLOOD BY AUTOMATED COUNT: 14.3 % (ref 11–15)
EST. AVERAGE GLUCOSE BLD GHB EST-MCNC: 134 MG/DL (ref 68–126)
FASTING PATIENT LIPID ANSWER: YES
FASTING STATUS PATIENT QL REPORTED: YES
GLOBULIN PLAS-MCNC: 2.7 G/DL (ref 2–3.5)
GLUCOSE BLD-MCNC: 105 MG/DL (ref 70–99)
HBA1C MFR BLD: 6.3 % (ref ?–5.7)
HCT VFR BLD AUTO: 30.5 %
HDLC SERPL-MCNC: 37 MG/DL (ref 40–59)
HGB BLD-MCNC: 9.7 G/DL
IMM GRANULOCYTES # BLD AUTO: 0.02 X10(3) UL (ref 0–1)
IMM GRANULOCYTES NFR BLD: 0.2 %
LDLC SERPL CALC-MCNC: 40 MG/DL (ref ?–100)
LYMPHOCYTES # BLD AUTO: 1.4 X10(3) UL (ref 1–4)
LYMPHOCYTES NFR BLD AUTO: 14.2 %
MCH RBC QN AUTO: 26.5 PG (ref 26–34)
MCHC RBC AUTO-ENTMCNC: 31.8 G/DL (ref 31–37)
MCV RBC AUTO: 83.3 FL
MICROALBUMIN UR-MCNC: 31.4 MG/DL
MICROALBUMIN/CREAT 24H UR-RTO: 669.5 UG/MG (ref ?–30)
MONOCYTES # BLD AUTO: 0.73 X10(3) UL (ref 0.1–1)
MONOCYTES NFR BLD AUTO: 7.4 %
NEUTROPHILS # BLD AUTO: 5.06 X10 (3) UL (ref 1.5–7.7)
NEUTROPHILS # BLD AUTO: 5.06 X10(3) UL (ref 1.5–7.7)
NEUTROPHILS NFR BLD AUTO: 51.2 %
NONHDLC SERPL-MCNC: 56 MG/DL (ref ?–130)
OSMOLALITY SERPL CALC.SUM OF ELEC: 295 MOSM/KG (ref 275–295)
PLATELET # BLD AUTO: 227 10(3)UL (ref 150–450)
POTASSIUM SERPL-SCNC: 4.5 MMOL/L (ref 3.5–5.1)
PROT SERPL-MCNC: 6.9 G/DL (ref 5.7–8.2)
RBC # BLD AUTO: 3.66 X10(6)UL
SODIUM SERPL-SCNC: 140 MMOL/L (ref 136–145)
TRIGL SERPL-MCNC: 78 MG/DL (ref 30–149)
TSI SER-ACNC: 1.19 MIU/ML (ref 0.55–4.78)
VLDLC SERPL CALC-MCNC: 11 MG/DL (ref 0–30)
WBC # BLD AUTO: 9.9 X10(3) UL (ref 4–11)

## 2024-06-08 PROCEDURE — 84443 ASSAY THYROID STIM HORMONE: CPT | Performed by: INTERNAL MEDICINE

## 2024-06-08 PROCEDURE — 71046 X-RAY EXAM CHEST 2 VIEWS: CPT | Performed by: INTERNAL MEDICINE

## 2024-06-08 PROCEDURE — 36415 COLL VENOUS BLD VENIPUNCTURE: CPT | Performed by: INTERNAL MEDICINE

## 2024-06-08 PROCEDURE — 80061 LIPID PANEL: CPT | Performed by: INTERNAL MEDICINE

## 2024-06-08 PROCEDURE — 83036 HEMOGLOBIN GLYCOSYLATED A1C: CPT | Performed by: INTERNAL MEDICINE

## 2024-06-08 PROCEDURE — 80053 COMPREHEN METABOLIC PANEL: CPT | Performed by: INTERNAL MEDICINE

## 2024-06-08 PROCEDURE — 85025 COMPLETE CBC W/AUTO DIFF WBC: CPT | Performed by: INTERNAL MEDICINE

## 2024-06-08 PROCEDURE — 82570 ASSAY OF URINE CREATININE: CPT | Performed by: INTERNAL MEDICINE

## 2024-06-08 PROCEDURE — 82043 UR ALBUMIN QUANTITATIVE: CPT | Performed by: INTERNAL MEDICINE

## 2024-06-18 RX ORDER — CLOPIDOGREL BISULFATE 75 MG/1
75 TABLET ORAL DAILY
Qty: 90 TABLET | Refills: 1 | Status: SHIPPED | OUTPATIENT
Start: 2024-06-18

## 2024-06-18 NOTE — TELEPHONE ENCOUNTER
Please review; protocol failed/No Protocol    Last Office Visit: 06/03/2024    Requested Prescriptions   Pending Prescriptions Disp Refills    CLOPIDOGREL 75 MG Oral Tab [Pharmacy Med Name: CLOPIDOGREL 75 MG TABLET] 90 tablet 1     Sig: TAKE 1 TABLET BY MOUTH EVERY DAY       There is no refill protocol information for this order        Future Appointments         Provider Department Appt Notes    In 2 months Priyank Rodriguez MD [facility] Return in about 3 months (around 9/3/2024).          Recent Outpatient Visits              2 weeks ago Type 2 diabetes mellitus without complication, without long-term current use of insulin ([facility]    Office Visit    3 months ago Diastolic congestive heart failure, unspecified HF chronicity ([facility]    Office Visit    6 months ago Type 2 diabetes mellitus without complication, without long-term current use of insulin ([facility])    [facility]    Office Visit    8 months ago     [facility] Wanda Strong, PT    Office Visit    8 months ago     [facility] Wanda Strong, PT    Office Visit

## 2024-06-24 ENCOUNTER — TELEPHONE (OUTPATIENT)
Dept: INTERNAL MEDICINE CLINIC | Facility: CLINIC | Age: 89
End: 2024-06-24

## 2024-06-24 DIAGNOSIS — D50.8 OTHER IRON DEFICIENCY ANEMIA: Primary | ICD-10-CM

## 2024-06-24 NOTE — TELEPHONE ENCOUNTER
Spoke to patient. Relayed message below.  He said that he never stopped taking his Iron supplement. He takes it twice a day and gets it from over the counter.     Dr. Rodriguez, informational message.

## 2024-06-24 NOTE — TELEPHONE ENCOUNTER
Noted.  Continue with current iron regimen.  Will recheck levels of iron and CBC in about 3 months.

## 2024-06-24 NOTE — TELEPHONE ENCOUNTER
Message  Received: 2 days ago  Priyank Rodriguez MD  P Em Rn Triage  Please send this letter to patient.  Also please call the patient directly by phone and go over the test results.  Make sure he is taking his iron daily as prescribed.          Communication Routing Information    Recipient Relationship Method Details   Mohamud DEMPSEY Amari Patient Mail [address]  [facility] [address]         Letter  Reason for letter: TEST RESULTS  Priyank Rodriguez MD on 6/22/2024     [address] [address]  Ph: [phone]  Fax: [phone]      June 22, 2024     Mohamud Andrew Stephanies  [address]  [place] [address]        Dear Mohamud:     Below are the results from your recent visit:            Resulted Orders   Comp Metabolic Panel (14)   Result Value Ref Range     Glucose 105 (H) 70 - 99 mg/dL     Sodium 140 136 - 145 mmol/L     Potassium 4.5 3.5 - 5.1 mmol/L     Chloride 109 98 - 112 mmol/L     CO2 24.0 21.0 - 32.0 mmol/L     Anion Gap 7 0 - 18 mmol/L     BUN 25 (H) 9 - 23 mg/dL     Creatinine 1.06 0.70 - 1.30 mg/dL     BUN/CREA Ratio 23.6 (H) 10.0 - 20.0     Calcium, Total 9.8 8.7 - 10.4 mg/dL     Calculated Osmolality 295 275 - 295 mOsm/kg     eGFR-Cr 68 >=60 mL/min/1.73m2     ALT 10 10 - 49 U/L     AST 8 <=34 U/L     Alkaline Phosphatase 82 45 - 117 U/L     Bilirubin, Total 0.4 0.2 - 1.1 mg/dL     Total Protein 6.9 5.7 - 8.2 g/dL     Albumin 4.2 3.2 - 4.8 g/dL     Globulin  2.7 2.0 - 3.5 g/dL     A/G Ratio 1.6 1.0 - 2.0     Patient Fasting for CMP? Yes     Lipid Panel   Result Value Ref Range     Cholesterol, Total 93 <200 mg/dL     HDL Cholesterol 37 (L) 40 - 59 mg/dL     Triglycerides 78 30 - 149 mg/dL     LDL Cholesterol 40 <100 mg/dL     VLDL 11 0 - 30 mg/dL     Non HDL Chol 56 <130 mg/dL     Patient Fasting for Lipid? Yes     TSH W Reflex To Free T4   Result Value Ref Range     TSH 1.193 0.550 - 4.780 mIU/mL   Microalb/Creat Ratio,  Random Urine   Result Value Ref Range     Microalbumin, Urine 31.40 mg/dL     Creatinine Ur Random 46.90 mg/dL     Malb/Cre Calc 669.5 (H) <=30.0 ug/mg   Hemoglobin A1C   Result Value Ref Range     HgbA1C 6.3 (H) <5.7 %     Estimated Average Glucose 134 (H) 68 - 126 mg/dL   CBC W/ DIFFERENTIAL   Result Value Ref Range     WBC 9.9 4.0 - 11.0 x10(3) uL     RBC 3.66 (L) 3.80 - 5.80 x10(6)uL     HGB 9.7 (L) 13.0 - 17.5 g/dL     HCT 30.5 (L) 39.0 - 53.0 %     MCV 83.3 80.0 - 100.0 fL     MCH 26.5 26.0 - 34.0 pg     MCHC 31.8 31.0 - 37.0 g/dL     RDW-SD 43.7 35.1 - 46.3 fL     RDW 14.3 11.0 - 15.0 %     .0 150.0 - 450.0 10(3)uL     Neutrophil Absolute Prelim 5.06 1.50 - 7.70 x10 (3) uL     Neutrophil Absolute 5.06 1.50 - 7.70 x10(3) uL     Lymphocyte Absolute 1.40 1.00 - 4.00 x10(3) uL     Monocyte Absolute 0.73 0.10 - 1.00 x10(3) uL     Eosinophil Absolute 2.59 (H) 0.00 - 0.70 x10(3) uL     Basophil Absolute 0.08 0.00 - 0.20 x10(3) uL     Immature Granulocyte Absolute 0.02 0.00 - 1.00 x10(3) uL     Neutrophil % 51.2 %     Lymphocyte % 14.2 %     Monocyte % 7.4 %     Eosinophil % 26.2 %     Basophil % 0.8 %     Immature Granulocyte % 0.2 %   Scan slide   Result Value Ref Range     Slide Review           Slide reviewed, normal WBC, RBC, and platelet morphology.                                             The blood cell count shows a hemoglobin of 9.7.  This is down slightly from before.  This is due to your known history of iron deficiency anemia.  Your anemia is a little bit worse now.  Make sure you are taking your iron tablets on a daily basis.  We will need to recheck this number in 3 months.     The A1c is 6.3.  Diabetes is well-controlled at this time.     The electrolytes levels in the blood are normal.     The kidney and liver function tests are all normal.     The urine test shows an abnormal amount of protein called microalbumin.  This is not a new finding for you.  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  We will continue to monitor this.     The total cholesterol, HDL cholesterol, LDL cholesterol, and triglycerides are all normal. This is good news.     The TSH thyroid blood level is normal. The thyroid gland is functioning normally.        If you have any questions or concerns, please don't hesitate to call.      Priyank Rodriguez MD   RE: Mohamud Fitzpatrick -- MR#: [id number]

## 2024-06-25 NOTE — TELEPHONE ENCOUNTER
Advised patient of Dr Rodriguez's note. Patient verbalized understanding and had no further questions.    Please review the future order for 3 months, thanks

## 2024-06-26 RX ORDER — DOCUSATE SODIUM 50MG AND SENNOSIDES 8.6MG 8.6; 5 MG/1; MG/1
1 TABLET, FILM COATED ORAL DAILY
Qty: 90 TABLET | Refills: 3 | OUTPATIENT
Start: 2024-06-26

## 2024-06-26 RX ORDER — METOPROLOL SUCCINATE 100 MG/1
150 TABLET, EXTENDED RELEASE ORAL DAILY
Qty: 135 TABLET | Refills: 3 | Status: SHIPPED | OUTPATIENT
Start: 2024-06-26

## 2024-06-26 NOTE — TELEPHONE ENCOUNTER
Refill Per Protocol     Requested Prescriptions   Pending Prescriptions Disp Refills    METOPROLOL SUCCINATE  MG Oral Tablet 24 Hr [Pharmacy Med Name: METOPROLOL SUCC  MG TAB] 135 tablet 1     Sig: TAKE 1.5 TABLETS (150MG TOTAL) BY MOUTH DAILY       Hypertension Medications Protocol Passed - 6/24/2024  8:18 AM        Passed - CMP or BMP in past 12 months        Passed - Last BP reading less than 140/90     BP Readings from Last 1 Encounters:   06/03/24 128/56               Passed - In person appointment or virtual visit in the past 12 mos or appointment in next 3 mos     Recent Outpatient Visits              3 weeks ago Type 2 diabetes mellitus without complication, without long-term current use of insulin ([facility]    [facility] Visit    4 months ago Diastolic congestive heart failure, unspecified HF chronicity ([facility])    [facility]    Office Visit    7 months ago Type 2 diabetes mellitus without complication, without long-term current use of insulin ([facility])    [facility]    Office Visit    8 months ago     [facility] Wanda Strong, PT    Office Visit    9 months ago     [facility] Wanda Strong, PT    Office Visit          Future Appointments         Provider Department Appt Notes    In 2 months Priyank Rodriguez MD [facility] Return in about 3 months (around 9/3/2024).                    Passed - EGFRCR or GFRNAA > 50     GFR Evaluation  EGFRCR: 68 , resulted on 6/8/2024                 Future Appointments         Provider Department Appt Notes    In 2 months Priyank Rodriguez MD [facility] Return in about 3 months (around 9/3/2024).          Recent Outpatient  Visits              3 weeks ago Type 2 diabetes mellitus without complication, without long-term current use of insulin ([facility])    [facility], Priyank Haley MD    Office Visit    4 months ago Diastolic congestive heart failure, unspecified HF chronicity ([facility])    [facility]    Office Visit    7 months ago Type 2 diabetes mellitus without complication, without long-term current use of insulin ([facility])    [facility]    Office Visit    8 months ago     [facility] Wanda Strong, PT    Office Visit    9 months ago     [facility] Wanda Strong, PT    Office Visit

## 2024-06-26 NOTE — TELEPHONE ENCOUNTER
Stimulant laxative: 1 year supply sent to [facility] in [place]- patient received a 90 day supply on 06/07/2024.

## 2024-06-28 RX ORDER — GLUCOSAM/CHON-MSM1/C/MANG/BOSW 500-416.6
1 TABLET ORAL 2 TIMES DAILY
Qty: 200 EACH | Refills: 3 | Status: SHIPPED | OUTPATIENT
Start: 2024-06-28

## 2024-06-28 NOTE — TELEPHONE ENCOUNTER
Refill passed per [facility] protocol.  Requested Prescriptions   Pending Prescriptions Disp Refills    TRUEPLUS LANCETS 33G Does not apply Misc [Pharmacy Med Name: TRUEPLUS LANCETS 33G] 200 each 3     Sig: Use to check glucose twice a day       Diabetic Supplies Protocol Passed - 6/25/2024  8:53 AM        Passed - In person appointment or virtual visit in the past 12 mos or appointment in next 3 mos     Recent Outpatient Visits              3 weeks ago Type 2 diabetes mellitus without complication, without long-term current use of insulin ([facility])    [facility]    [facility] Visit    4 months ago Diastolic congestive heart failure, unspecified HF chronicity ([facility])    [facility]    Office Visit    7 months ago Type 2 diabetes mellitus without complication, without long-term current use of insulin ([facility])    [facility]    Office Visit    8 months ago     [facility] Wanda Strong, [facility]    Office Visit    9 months ago     [facility]    [facility] Visit          Future Appointments         Provider Department Appt Notes    In 2 months Priyank Rodriguez MD [facility] Return in about 3 months (around 9/3/2024).                       Recent Outpatient Visits              3 weeks ago Type 2 diabetes mellitus without complication, without long-term current use of insulin ([facility])    [facility]    Office Visit    4 months ago Diastolic congestive heart failure, unspecified HF chronicity ([facility])    [facility]    Office Visit    7 months ago Type 2 diabetes mellitus without complication, without long-term  current use of insulin (HCC)    [facility] Priyank Rodriguez MD    Office Visit    8 months ago     [facility] Wanda Strong, PT    Office Visit    9 months ago     [facility] Wanda Strong, PT    Office Visit          Future Appointments         Provider Department Appt Notes    In 2 months Priyank Rodriguez MD [facility] Return in about 3 months (around 9/3/2024).

## 2024-07-01 ENCOUNTER — TELEPHONE (OUTPATIENT)
Dept: INTERNAL MEDICINE CLINIC | Facility: CLINIC | Age: 89
End: 2024-07-01

## 2024-07-01 NOTE — TELEPHONE ENCOUNTER
Patient calling ( name and date of birth of patient verified ) states had pneumonia and finished the antibiotics  ( Augmentin, 6/13/)    remains with some phlegm that is thick , cream  colored and very slight cough, runny nose      Has not tried any OTC for the phlegm    Denies fever, no chest pain , no shortness of breath     Patient is asking for recommendations  Please advise and thank you.        Best call back number: Mohamud  at [phone]

## 2024-07-02 NOTE — TELEPHONE ENCOUNTER
Patient should get the repeat chest x-ray that I have ordered.  He can come in and get that done.  He can try over-the-counter Mucinex to see if it helps to break up the thick phlegm.  We will hold off on further antibiotics until we get the x-ray.

## 2024-07-02 NOTE — TELEPHONE ENCOUNTER
Patient contacted and made aware of Dr. Rodriguez's interpretation and recommendations. He will call central scheduling to schedule Xray of Chest this week. Patient verbalized understanding. No further questions or concerns at this time.

## 2024-07-03 RX ORDER — AMLODIPINE BESYLATE 10 MG/1
10 TABLET ORAL DAILY
Qty: 90 TABLET | Refills: 3 | Status: SHIPPED | OUTPATIENT
Start: 2024-07-03

## 2024-07-03 NOTE — TELEPHONE ENCOUNTER
Patient requesting a medication refill and mentions he is low on dosage:    Amlodipine    [facility] in [facility] confirmed preferred

## 2024-07-03 NOTE — TELEPHONE ENCOUNTER
Refill passed per protocol.    - Per patient he would like his refills at the [facility] pharmacy in [place].  1 year supply was sent to patient's mail order pharmacy 12/7/23. Sending to [facility]    Requested Prescriptions   Pending Prescriptions Disp Refills    amLODIPine 10 MG Oral Tab 90 tablet 3     Sig: Take 1 tablet (10 mg total) by mouth daily.       Hypertension Medications Protocol Passed - 7/3/2024 11:10 AM        Passed - CMP or BMP in past 12 months        Passed - Last BP reading less than 140/90     BP Readings from Last 1 Encounters:   06/03/24 128/56               Passed - In person appointment or virtual visit in the past 12 mos or appointment in next 3 mos     Recent Outpatient Visits              1 month ago Type 2 diabetes mellitus without complication, without long-term current use of insulin ([facility])    [facility] Priyank Rodriguez MD    Office Visit    4 months ago Diastolic congestive heart failure, unspecified HF chronicity ([facility])    [facility]    Office Visit    7 months ago Type 2 diabetes mellitus without complication, without long-term current use of insulin ([facility])    [facility] Priyank Rodriguez MD    Office Visit    8 months ago     [facility] Wanda Strong, PT    Office Visit    9 months ago     [facility]    Office Visit          Future Appointments         Provider Department Appt Notes    In 3 days CF XR RM1 [facility]    In 2 months Priyank Rodriguez MD [facility] Return in about 3 months (around 9/3/2024).                    Passed - EGFRCR or GFRNAA > 50     GFR Evaluation  EGFRCR: 68 , resulted on 6/8/2024               Future Appointments         Provider Department Appt Notes    In 3  days [facility] XR RM1 [facility] qa dd    In 2 months Priyank Rodriguez MD [facility] Return in about 3 months (around 9/3/2024).          Recent Outpatient Visits              1 month ago Type 2 diabetes mellitus without complication, without long-term current use of insulin ([facility])    [facility] Priyank Rodriguez MD    Office Visit    4 months ago Diastolic congestive heart failure, unspecified HF chronicity ([facility])    [facility] Priyank Rodriguez MD    Office Visit    7 months ago Type 2 diabetes mellitus without complication, without long-term current use of insulin ([facility])    [facility] Priyank Rodriguez MD    Office Visit    8 months ago     [facility] Wanda Strong, PT    Office Visit    9 months ago     [facility] Wanda Strong, PT    Office Visit

## 2024-07-06 ENCOUNTER — HOSPITAL ENCOUNTER (OUTPATIENT)
Dept: GENERAL RADIOLOGY | Facility: HOSPITAL | Age: 89
Discharge: HOME | End: 2024-07-06
Attending: INTERNAL MEDICINE
Payer: MEDICARE

## 2024-07-06 DIAGNOSIS — J18.9 COMMUNITY ACQUIRED PNEUMONIA OF RIGHT LUNG, UNSPECIFIED PART OF LUNG: ICD-10-CM

## 2024-07-06 PROCEDURE — 71046 X-RAY EXAM CHEST 2 VIEWS: CPT | Performed by: INTERNAL MEDICINE

## 2024-07-11 ENCOUNTER — TELEPHONE (OUTPATIENT)
Dept: INTERNAL MEDICINE CLINIC | Facility: CLINIC | Age: 89
End: 2024-07-11

## 2024-07-11 DIAGNOSIS — R91.1 LUNG NODULE: Primary | ICD-10-CM

## 2024-07-11 NOTE — TELEPHONE ENCOUNTER
Patient calling for Chest X ray results done 7/6/24.  Patient denies pain at this time. Patient aware Dr. Rodriguez is out of office today  Impression   CONCLUSION:     Persistent left midlung 8 mm nodule.  Recommend further assessment with CT chest.     Decreased right upper lung alveolar opacity compared to prior.  Finding may represent resolving pneumonitis.  Recommend attention on CT chest.

## 2024-07-12 NOTE — TELEPHONE ENCOUNTER
Please call the patient.  The chest x-ray shows a persistent small 8 mm nodule in the middle part of the left lung.  It is not clear whether this is anything to worry about or not.  The radiologist has requested that we pursue a CT scan.  I have placed the order for the CT of the chest.  Please help the patient in getting this scheduled with central scheduling.  We will contact him with the results.  I do think it is important for him to get this test just to make sure this is nothing to worry about.

## 2024-07-12 NOTE — TELEPHONE ENCOUNTER
Called patient,verified name and date of birth. Reviewed Dr Rodriguez's recommendations from  the note below. Patient verbalizes understanding and agrees to plan of care.  Called  central scheduling to secure appointment: Saturday July 27, [facility] 1:30pm, [facility] east entrance, green parking lot.  Patient wrote down information and agrees with plan

## 2024-07-17 ENCOUNTER — TELEPHONE (OUTPATIENT)
Dept: INTERNAL MEDICINE CLINIC | Facility: CLINIC | Age: 89
End: 2024-07-17

## 2024-07-17 NOTE — TELEPHONE ENCOUNTER
Patient due for his annual medicare physical exam and diabetic foot exam. Patient last seen in office 6/3/24, next OV scheduled 10/14/24. Care Gap letter generated and mailed to patients home address listed in EMR.

## 2024-07-27 ENCOUNTER — HOSPITAL ENCOUNTER (OUTPATIENT)
Dept: CT IMAGING | Facility: HOSPITAL | Age: 89
Discharge: HOME | End: 2024-07-27
Attending: INTERNAL MEDICINE
Payer: MEDICARE

## 2024-07-27 DIAGNOSIS — R91.1 LUNG NODULE: ICD-10-CM

## 2024-07-27 LAB
CREAT BLD-MCNC: 1 MG/DL
EGFRCR SERPLBLD CKD-EPI 2021: 73 ML/MIN/1.73M2 (ref 60–?)

## 2024-07-27 PROCEDURE — 71260 CT THORAX DX C+: CPT | Performed by: INTERNAL MEDICINE

## 2024-07-27 PROCEDURE — 82565 ASSAY OF CREATININE: CPT

## 2024-08-02 ENCOUNTER — TELEPHONE (OUTPATIENT)
Dept: CASE MANAGEMENT | Age: 89
End: 2024-08-02

## 2024-08-02 DIAGNOSIS — R05.9 COUGH, UNSPECIFIED TYPE: Primary | ICD-10-CM

## 2024-08-02 DIAGNOSIS — R09.89 PHLEGM IN THROAT: ICD-10-CM

## 2024-08-02 NOTE — TELEPHONE ENCOUNTER
Attempted to call [facility] dept but it's now closed, ext [phone].   Routed to [facility] staff for assistance, thanks.

## 2024-08-02 NOTE — TELEPHONE ENCOUNTER
Dr. Rodriguez,     Patient is requesting a referral to another pulmonologist, Dr. Alicea. He does not want to wait for the availability with Dr. Reddy.     Pended referral please review diagnosis and sign off if you agree.    Thank you.  Shameka Haile  [facility]

## 2024-08-05 ENCOUNTER — OFFICE VISIT (OUTPATIENT)
Dept: PULMONOLOGY | Facility: CLINIC | Age: 89
End: 2024-08-05

## 2024-08-05 ENCOUNTER — TELEPHONE (OUTPATIENT)
Dept: PULMONOLOGY | Facility: CLINIC | Age: 89
End: 2024-08-05

## 2024-08-05 VITALS
HEART RATE: 64 BPM | DIASTOLIC BLOOD PRESSURE: 71 MMHG | BODY MASS INDEX: 19.56 KG/M2 | HEIGHT: 60 IN | OXYGEN SATURATION: 98 % | WEIGHT: 99.63 LBS | SYSTOLIC BLOOD PRESSURE: 138 MMHG

## 2024-08-05 DIAGNOSIS — R13.12 OROPHARYNGEAL DYSPHAGIA: Primary | ICD-10-CM

## 2024-08-05 PROBLEM — E11.65 TYPE 2 DIABETES MELLITUS WITH HYPERGLYCEMIA, WITHOUT LONG-TERM CURRENT USE OF INSULIN (HCC): Chronic | Status: ACTIVE | Noted: 2024-08-05

## 2024-08-05 PROBLEM — E11.649 TYPE 2 DIABETES MELLITUS WITH HYPOGLYCEMIA WITHOUT COMA, WITHOUT LONG-TERM CURRENT USE OF INSULIN (HCC): Chronic | Status: ACTIVE | Noted: 2024-08-05

## 2024-08-05 PROBLEM — R93.89 ABNORMAL CT OF THE CHEST: Status: ACTIVE | Noted: 2024-08-05

## 2024-08-05 PROBLEM — R05.2 SUBACUTE COUGH: Status: ACTIVE | Noted: 2024-08-05

## 2024-08-05 PROCEDURE — 3078F DIAST BP <80 MM HG: CPT | Performed by: INTERNAL MEDICINE

## 2024-08-05 PROCEDURE — 99203 OFFICE O/P NEW LOW 30 MIN: CPT | Performed by: INTERNAL MEDICINE

## 2024-08-05 PROCEDURE — 3008F BODY MASS INDEX DOCD: CPT | Performed by: INTERNAL MEDICINE

## 2024-08-05 PROCEDURE — 1126F AMNT PAIN NOTED NONE PRSNT: CPT | Performed by: INTERNAL MEDICINE

## 2024-08-05 PROCEDURE — 1159F MED LIST DOCD IN RCRD: CPT | Performed by: INTERNAL MEDICINE

## 2024-08-05 PROCEDURE — 3075F SYST BP GE 130 - 139MM HG: CPT | Performed by: INTERNAL MEDICINE

## 2024-08-05 RX ORDER — PREDNISONE 20 MG/1
TABLET ORAL
Qty: 10 TABLET | Refills: 0 | Status: SHIPPED | OUTPATIENT
Start: 2024-08-05

## 2024-08-05 RX ORDER — AMOXICILLIN AND CLAVULANATE POTASSIUM 500; 125 MG/1; MG/1
1 TABLET, FILM COATED ORAL 2 TIMES DAILY
Qty: 14 TABLET | Refills: 0 | Status: SHIPPED | OUTPATIENT
Start: 2024-08-05

## 2024-08-05 NOTE — TELEPHONE ENCOUNTER
Dr. Rodriguez,     Patient is upset on phone and is insisting on appointment with Dr. Alicea.  He does not want to wait till 8/22/24.     Please have clinical staff reach out to patient regarding this, if you feel patient can wait for this appointment.    He wants to be seen by a specialist right away.     Thank you.

## 2024-08-05 NOTE — PROGRESS NOTES
Dear Mingo.:           As you know, Mohamud is an 89-year-old white male who I am now evaluating for chronic cough and tiny pulmonary nodules.       HISTORY OF PRESENT ILLNESS: The patient has a distant history of tobacco use having quit smoking 40 years ago after 1 pack/day for 30 years.  He was diagnosed with pneumonia several weeks ago at which time he received empiric antibiotic and improved clinically.  However, he has ongoing lingering cough with green-colored phlegm.  He denies trouble swallowing but yet has the harsh wet cough.  There is no acid reflux and no significant postnasal drip.  He admits to occasional expiratory wheeze.  He has not used a metered-dose inhaler.  There is no hemoptysis, chest pain, shortness of breath, personal nor family history of deep venous thrombosis.  He will get an occasional runny nose after he sneezes.    PAST MEDICAL AND SURGICAL HISTORY:   1.  Diabetes hypertension distant history of tobacco CABG pressure ulcer bilateral carotid stenosis    SOCIAL HISTORY: , 2 children and is companied today by his daughter Samnia.  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    FAMILY HISTORY: Mother  stroke, father  old age    ALLERGIES TO MEDICATIONS: None    MEDICATIONS: Allopurinol amlodipine atorvastatin clopidogrel eyedrops iron furosemide glipizide metformin losartan metoprolol terazosin    REVIEW OF SYSTEMS: Review of Systems:  Vision normal. Ear nose and throat normal. Bowel normal. Bladder function normal. No depression. No thyroid disease. No lymphatic system concerns.  No rash. Muscles and joints unremarkable.  50 pound weight loss after loss of teeth with poorly fitting dentures.    PHYSICAL EXAMINATION: Physical Examination:  Vital signs normal. HEENT examination is unremarkable with pupils equal round and reactive to light and accommodation. Neck without adenopathy, thyromegaly, JVD nor bruit. Lungs  robust bilateral central expiratory rattle to auscultation and percussion. Cardiac regular rate and rhythm no murmur. Abdomen nontender, without hepatosplenomegaly and no mass appreciable. Extremities and Musculoskeletal without clubbing cyanosis nor edema, and mobility acceptable. Neurologic grossly intact with symmetric tone and strength and reflex.    LABORATORY: CT scan chest-No left midlung nodule to correspond to the abnormality seen on prior radiograph.      0.5 cm right lower lobe pulmonary nodule. Followup chest CT recommended in 12 months to demonstrate resolution.       Mucous plugging within the left lower lobe. This can also be assessed for resolution on subsequent follow-up imaging.       Retained secretions within the trachea and bilateral mainstem bronchi.    Chest x-ray-June [id number]-xipsorh right upper lobe alveolar opacity    Chest x-ray-July 2024-decreasing right upper lobe alveolar opacity    ASSESSMENT AND PLAN:  PROBLEM 1.  Robust cough-the patient has had nasty rattle in the chest currently.  I am concerned that he could have intermittent silent aspiration.  Will give a short course of prednisone and Levaquin.  The patient's daughter will call me in 1 week to give an update on how he is doing.  I am not confident that he could work in metered-dose inhaler.  May be a candidate for nebulizer in the future depending on course.  Will evaluate with a video swallow study.  There are a couple tiny pulmonary nodules and the patient has a distant history of tobacco at 30-pack-year but having quit 40 years ago.    RECOMMENDATIONS:  1.  Video swallow study  2.  Levaquin  3.  Short course prednisone and they will follow the blood sugars closely  4.  Will consider repeat CT scan of the chest at the 1 year interval  5.  Daughter will call me in 1 week to give an update and the patient should plan on seeing him again in the 2-month interval.    I am delighted to assist in Mohamud's care.            With  warmest regards,     Sandip Pérez MD  Medical Director, Critical Care, [facility]  Medical Director, [facility]

## 2024-08-05 NOTE — TELEPHONE ENCOUNTER
Patient need to come back for 2 month follow up per [facility], but there is no opening available , please contact patient.

## 2024-08-05 NOTE — TELEPHONE ENCOUNTER
Would anyone be willing to see patient sooner? Recent CT chest done. Patient does not want to wait to see our pulmonary group.

## 2024-08-05 NOTE — TELEPHONE ENCOUNTER
Patient's daughter accepted appointment for patient with Dr. Pérez today at 5 pm ([facility]). Appointment information provided. She voiced understanding.

## 2024-08-06 NOTE — TELEPHONE ENCOUNTER
Spoke with patient's daughter Samina. Appointment scheduled for patient on 10/8/24 at 12:15PM. Confirmed date, time, place. Daughter verbalized understanding.

## 2024-08-30 ENCOUNTER — HOSPITAL ENCOUNTER (OUTPATIENT)
Dept: GENERAL RADIOLOGY | Facility: HOSPITAL | Age: 89
Discharge: HOME | End: 2024-08-30
Attending: INTERNAL MEDICINE
Payer: MEDICARE

## 2024-08-30 DIAGNOSIS — R13.12 OROPHARYNGEAL DYSPHAGIA: ICD-10-CM

## 2024-08-30 PROCEDURE — 92611 MOTION FLUOROSCOPY/SWALLOW: CPT

## 2024-08-30 PROCEDURE — 74230 X-RAY XM SWLNG FUNCJ C+: CPT | Performed by: INTERNAL MEDICINE

## 2024-08-30 NOTE — PROGRESS NOTES
ADULT VIDEOFLUOROSCOPIC SWALLOWING STUDY       ADULT VIDEOFLUOROSCOPIC SWALLOWING STUDY:   Referring Physician: Beto      Radiologist: Dr. Antony  Diagnosis: dysphagia    Date of Service: 8/30/2024     PATIENT SUMMARY   Chief Complaint: The patient's daughter, Samina, accompanied him to the exam and provided most pertinent background information.  She has noticed that since the patient had carotid artery surgery he has to be very careful when he eats.  He has intermittent throat clearing. He was recently diagnosed with pneumonia which was successfully treated with antibiotics, but then the patient developed bronchitis.  At some point he had all his teeth removed as they were in poor condition and had complications with prolonged bleeding.  He eventually got dentures but they do not fit correctly so he does not wear them.  The patient currently denies shortness of breath and odynophagia.  He has had some weight loss which is attributed to his dental issues.  Current Diet: soft easy to chew foods and regular liquids       Problem List  Active Problems:  Active Problems:    * No active hospital problems. *      Past Medical History  Past Medical History:    Acute congestive heart failure, unspecified heart failure type (HCC)    Coronary atherosclerosis    Diabetes (HCC)    Essential hypertension    Former smoker    High blood pressure    History of quadruple bypass    Pressure ulcer        Imaging results: 7/27/24 Chest CT:  No left midlung nodule to correspond to the abnormality seen on prior radiograph.    0.5 cm right lower lobe pulmonary nodule. Followup chest CT recommended in 12 months to demonstrate resolution.     Mucous plugging within the left lower lobe. This can also be assessed for resolution on subsequent follow-up imaging.     Retained secretions within the trachea and bilateral mainstem bronchi.     ASSESSMENT   DYSPHAGIA ASSESSMENT  Test completed in conjunction with Radiologist.   Food/Liquid  Types Presented: puree, solid, nectar thick liquid, and thin liquids.    Study Position and View:  Patient was seated upright and viewed laterally and A-P.    Pain Assessment: The patient reports pain at a level of 0/10.    Oral phase:  Bilabial seal was primarily intact with a trace amount of thin liquid loss anteriorly at midline.  Moderately increased mastication time of small piece of cracker due to edentulous status.  Reduced bolus formation and control resulted in piecemeal posterior propulsion of the bolus and premature spillage of thin liquids into the pharynx.  Mild-moderate oral residue was cleared with subsequent swallows.     Pharyngeal phase:  The pharyngeal response triggered at the valleculae for thin liquids with one second delay, except with one episode of triggering at the tongue base.  Puree and solids triggered at the valleculae.  Delayed epiglottic inversion resulted one episode of trace laryngeal penetration with thin liquids which remained in the laryngeal vestibule.  When cued to clear his throat, he was able to eject.  No definite aspiration was observed.  Reduced based of tongue retraction resulted in minimal vallecular retention with puree and thin liquids and mild vallecular retention with solids.   Hyolaryngeal excursion appeared WNL.    Esophageal phase:   Adequate flow of bolus through upper esophagus     Penetration Aspiration Scale: 3/8.  Material entered the airway, remained above the vocal cords and was not ejected from the airway. One episode with thin liquids by cup.    Overall Impression: Minimal oropharyngeal dysphagia was characterized by one episode of trace laryngeal penetration with thin liquids.  When cued to cough, patient was able to eject.  No aspiration was observed during the study.  Minimal vallecular retention remained.      Recommend soft easy to chew foods with extra moisture and thin liquids.  Discussed the possibility of dysphagia therapy for the minimal  dysphagia with patient and his daughter and we agreed to try compensatory strategies and exercises on his own at this juncture.  Pt was advised to cough after every few swallows of liquids to eject any material that may have entered and to clean his mouth 3-4 times per day.  In the event that trace amounts do enter the pulmonary system, good oral hygiene helps to reduce the risk of aspiration pneumonia.  Pt and daughter were instructed on Effortful Swallow Exercise and the patient was able to demonstrate successfully.      FCM category and level: Swallowing, 6  PLAN   Potential: Good    Diet Recommendations:  Solids: Soft easy to chew foods, IDDSI 5-6 as tolerated  Liquids: Thin, IDDSI 0    Recommended compensatory strategies:   Sit upright  Small sips  Cough after every 2-3 liquid swallows  Brush teeth/clean mouth 3-4 times per day    Medication Administration:  Take pills one at a time with water or with puree    Further Follow-up:  No follow up with this service at this time.  Follow up with Dr. Pérez.  Completed effortful swallows in reps of 20 at home twice a day.      EDUCATION/INSTRUCTION  Reviewed results and recommendations with patient and family.  Written instructions were provided.  Agreement/Understanding verbalized and all questions answered to their apparent satisfaction.      INTERDISCIPLINARY COMMUNICATION  Reviewed results with Radiologist; agreement verbalized.      Thank you for your referral.  If you have any questions, please contact me at [phone].    Tamika Bahena MA/[facility]-SLP  Speech Language Pathologist  [facility]  [phone]    Electronically signed by therapist: Tamika Bahena, SLP  Physician's certification required: No

## 2024-08-30 NOTE — PATIENT INSTRUCTIONS
VIDEO SWALLOW STUDY    Diet Recommendations:  Solids: Soft easy to chew foods, IDDSI 5-6 as tolerated  Liquids: Thin, IDDSI 0    Recommended compensatory strategies:   Sit upright  Small sips  Cough after every 2-3 liquid swallows  Brush teeth/clean mouth 3-4 times per day    Medication Administration:  Take pills one at a time with water or with puree    Further Follow-up:  No follow up with this service at this time.  Follow up with Dr. Pérez.  Completed effortful swallows in reps of 20 at home twice a day.    Effortful Swallow or Golf Ball Swallow  Swallow as hard as you can, squeezing the muscles of your throat tightly and pressing your tongue hard up against the roof of your mouth.  Swallow like you are swallowing something very large feeling all the muscles in your throat squeeze tightly.    Tamika Bahena MA/[facility]-SLP  Speech Language Pathologist  [facility]  [phone]

## 2024-09-04 ENCOUNTER — TELEPHONE (OUTPATIENT)
Dept: INTERNAL MEDICINE CLINIC | Facility: CLINIC | Age: 89
End: 2024-09-04

## 2024-09-04 ENCOUNTER — TELEPHONE (OUTPATIENT)
Dept: PULMONOLOGY | Facility: CLINIC | Age: 89
End: 2024-09-04

## 2024-09-04 DIAGNOSIS — E11.649 TYPE 2 DIABETES MELLITUS WITH HYPOGLYCEMIA WITHOUT COMA, WITHOUT LONG-TERM CURRENT USE OF INSULIN (HCC): Primary | ICD-10-CM

## 2024-09-04 NOTE — TELEPHONE ENCOUNTER
Received fax from [facility] [place] requesting referral for office visit scheduled on 10/2/24. Referral pending in EMR JSK please advise.

## 2024-09-04 NOTE — TELEPHONE ENCOUNTER
Sandip Pérez MD P [facility] Clinical Staff  RN, please let the patient know that he did fairly well with his swallowing study but he needs to be careful to follow the directions from the speech-language pathologist.  Can talk to the daughter alternatively.

## 2024-09-09 RX ORDER — FERROUS SULFATE 325(65) MG
325 TABLET, DELAYED RELEASE (ENTERIC COATED) ORAL
Qty: 39 TABLET | Refills: 1 | Status: SHIPPED | OUTPATIENT
Start: 2024-09-09

## 2024-09-09 NOTE — TELEPHONE ENCOUNTER
Please Review. Protocol Failed; No Protocol     Requested Prescriptions   Pending Prescriptions Disp Refills    FERROUS SULFATE 325 (65 FE) MG Oral Tab EC [Pharmacy Med Name: FERROUS SULF  MG TABLET] 39 tablet 1     Sig: TAKE 1 TABLET (325 MG TOTAL) BY MOUTH 3 TIMES A WEEK       There is no refill protocol information for this order            Future Appointments         Provider Department Appt Notes    In 4 weeks Sandip Pérez MD [facility] 2 month f/u    In 1 month Priyank Rodriguez MD [facility] Return in about 3 months (around 9/3/2024).          Recent Outpatient Visits              1 month ago Oropharyngeal dysphagia    [facility] Sandip Pérez MD    Office Visit    3 months ago Type 2 diabetes mellitus without complication, without long-term current use of insulin ([facility])    [facility]    Office Visit    6 months ago Diastolic congestive heart failure, unspecified HF chronicity ([facility]    Office Visit    9 months ago Type 2 diabetes mellitus without complication, without long-term current use of insulin ([facility])    [facility] MD    Office Visit    11 months ago     [facility] Wanda Strong PT    Office Visit

## 2024-09-10 RX ORDER — BLOOD SUGAR DIAGNOSTIC
STRIP MISCELLANEOUS
Qty: 200 STRIP | Refills: 3 | Status: SHIPPED | OUTPATIENT
Start: 2024-09-10

## 2024-09-10 NOTE — TELEPHONE ENCOUNTER
Refill passes per [facility] protocol.    Future Appointments   Date Time Provider Department Center   10/14/2024  2:40 PM Priyank Rodriguez MD [facility]     LAST OFFICE VISIT: 6/3/2024    Requested Prescriptions   Pending Prescriptions Disp Refills    Glucose Blood (ACCU-CHEK SMARTVIEW) In Vitro Strip [Pharmacy Med Name: Accu-Chek SmartView In Vitro Strip] 200 strip 3     Sig: Use 1 (one) new strip twice daily for blood glucose monitoring       Diabetic Supplies Protocol Passed - 9/7/2024  1:31 AM        Passed - In person appointment or virtual visit in the past 12 mos or appointment in next 3 mos     Recent Outpatient Visits              1 month ago Oropharyngeal dysphagia    [facility] Sandip Pérez MD    Office Visit    3 months ago Type 2 diabetes mellitus without complication, without long-term current use of insulin ([facility])    [facility]    Office Visit    6 months ago Diastolic congestive heart failure, unspecified HF chronicity ([facility])    [facility] Priyank Haley MD    Office Visit    9 months ago Type 2 diabetes mellitus without complication, without long-term current use of insulin ([facility])    [facility]    Office Visit    11 months ago     [facility] Wanda Strong, PT    Office Visit          Future Appointments         Provider Department Appt Notes    In 4 weeks Sandip Pérez MD [facility] 2 month f/u    In 1 month Priyank Rodriguez MD [facility] Return in about 3 months (around 9/3/2024).                         Future Appointments         Provider Department Appt Notes    In 4 weeks Sandip Pérez MD [facility] 2 month f/u    In 1 month Priyank Rodriguez MD [facility] Return in about 3 months (around 9/3/2024).          Recent Outpatient Visits              1 month ago Oropharyngeal dysphagia    [facility] Sandip Pérez MD    Office Visit    3 months ago Type 2 diabetes mellitus without complication, without long-term current use of insulin ([facility]    Office Visit    6 months ago Diastolic congestive heart failure, unspecified HF chronicity ([facility])    [facility]    Office Visit    9 months ago Type 2 diabetes mellitus without complication, without long-term current use of insulin ([facility])    [facility] Priyank Haley MD    Office Visit    11 months ago     [facility] Wanda Strong PT    Office Visit

## 2024-09-16 RX ORDER — FUROSEMIDE 20 MG/1
20 TABLET ORAL DAILY
Qty: 90 TABLET | Refills: 3 | Status: SHIPPED | OUTPATIENT
Start: 2024-09-16 | End: 2024-09-26

## 2024-09-16 RX ORDER — CLOPIDOGREL BISULFATE 75 MG/1
75 TABLET ORAL DAILY
Qty: 90 TABLET | Refills: 3 | OUTPATIENT
Start: 2024-09-16

## 2024-09-16 NOTE — TELEPHONE ENCOUNTER
Please Review. Protocol Failed; No Protocol     Requested Prescriptions   Pending Prescriptions Disp Refills    clopidogrel 75 MG Oral Tab [Pharmacy Med Name: CLOPIDOGREL 75 MG TABLET] 90 tablet 1     Sig: Take 1 tablet (75 mg total) by mouth daily.       There is no refill protocol information for this order      Signed Prescriptions Disp Refills    furosemide 20 MG Oral Tab 90 tablet 3     Sig: Take 1 tablet (20 mg total) by mouth daily.       Hypertension Medications Protocol Passed - 9/12/2024  5:31 PM        Passed - CMP or BMP in past 12 months        Passed - Last BP reading less than 140/90     BP Readings from Last 1 Encounters:   08/05/24 138/71               Passed - In person appointment or virtual visit in the past 12 mos or appointment in next 3 mos     Recent Outpatient Visits              1 month ago Oropharyngeal dysphagia    [facility] Sandip Pérez MD    Office Visit    3 months ago Type 2 diabetes mellitus without complication, without long-term current use of insulin ([facility]    [facility]    Office Visit    6 months ago Diastolic congestive heart failure, unspecified HF chronicity ([facility])    [facility]    Office Visit    9 months ago Type 2 diabetes mellitus without complication, without long-term current use of insulin ([facility])    [facility]    Office Visit    11 months ago     [facility] Wanda Strong, PT    Office Visit          Future Appointments         Provider Department Appt Notes    In 3 weeks Sandip Pérez MD [facility] 2 month f/u    In 4 weeks Priyank Rodriguez MD [facility] Return in about 3 months (around  9/3/2024).  pneumonia and flu vaccines requested                    Passed - EGFRCR or GFRNAA > 50     GFR Evaluation  EGFRCR: 73 , resulted on 7/27/2024                 Future Appointments         Provider Department Appt Notes    In 3 weeks [facility]    In 4 weeks Priyank Rodriguez MD [facility] Return in about 3 months (around 9/3/2024).  pneumonia and flu vaccines requested          Recent Outpatient Visits              1 month ago Oropharyngeal dysphagia    [facility] Sandip Pérez MD    Office Visit    3 months ago Type 2 diabetes mellitus without complication, without long-term current use of insulin ([facility])    [facility], Priyank Haley MD    Office Visit    6 months ago Diastolic congestive heart failure, unspecified HF chronicity ([facility])    [facility]    Office Visit    9 months ago Type 2 diabetes mellitus without complication, without long-term current use of insulin ([facility])    [facility]    Office Visit    11 months ago     [facility] Wanda Strong PT    Office Visit

## 2024-09-16 NOTE — TELEPHONE ENCOUNTER
Refill Per Protocol     Requested Prescriptions   Pending Prescriptions Disp Refills    FUROSEMIDE 20 MG Oral Tab [Pharmacy Med Name: FUROSEMIDE 20 MG TABLET] 90 tablet 3     Sig: TAKE ONE TAB BY MOUTH ONCE DAILY       Hypertension Medications Protocol Passed - 9/12/2024  5:31 PM        Passed - CMP or BMP in past 12 months        Passed - Last BP reading less than 140/90     BP Readings from Last 1 Encounters:   08/05/24 138/71               Passed - In person appointment or virtual visit in the past 12 mos or appointment in next 3 mos     Recent Outpatient Visits              1 month ago Oropharyngeal dysphagia    [facility] Sandip [facility]    Office Visit    3 months ago Type 2 diabetes mellitus without complication, without long-term current use of insulin ([facility]    [facility]    6 months ago Diastolic congestive heart failure, unspecified HF chronicity ([facility]    Office Visit    9 months ago Type 2 diabetes mellitus without complication, without long-term current use of insulin ([facility])    [facility] Priyank Haley MD    Office Visit    11 months ago     [facility] Wanda Strong, JARRED    Office Visit          Future Appointments         Provider Department Appt Notes    In 3 weeks Sandip Pérez MD [facility] 2 month f/u    In 4 weeks Priyank Rodriguez MD [facility] Return in about 3 months (around 9/3/2024).  pneumonia and flu vaccines requested                    Passed - EGFRCR or GFRNAA > 50     GFR Evaluation  EGFRCR: 73 , resulted on 7/27/2024            CLOPIDOGREL 75 MG Oral Tab [Pharmacy Med Name: CLOPIDOGREL 75 MG TABLET] 90 tablet 1      Sig: TAKE 1 TABLET BY MOUTH EVERY DAY       There is no refill protocol information for this order            Future Appointments         Provider Department Appt Notes    In 3 weeks Sandip Pérez MD [facility] 2 month f/u    In 4 weeks Priyank Rodriguez MD [facility] Return in about 3 months (around 9/3/2024).  pneumonia and flu vaccines requested          Recent Outpatient Visits              1 month ago Oropharyngeal dysphagia    [facility] Sandip Pérez MD    Office Visit    3 months ago Type 2 diabetes mellitus without complication, without long-term current use of insulin ([facility]    [facility]    6 months ago Diastolic congestive heart failure, unspecified HF chronicity ([facility])    [facility]    Office Visit    9 months ago Type 2 diabetes mellitus without complication, without long-term current use of insulin ([facility])    [facility]    [facility]    11 months ago     [facility] Wanda Strong PT    Office Visit

## 2024-09-21 ENCOUNTER — LAB ENCOUNTER (OUTPATIENT)
Dept: LAB | Facility: HOSPITAL | Age: 89
End: 2024-09-21
Attending: INTERNAL MEDICINE
Payer: MEDICARE

## 2024-09-21 DIAGNOSIS — D50.8 OTHER IRON DEFICIENCY ANEMIA: ICD-10-CM

## 2024-09-21 LAB
BASOPHILS # BLD AUTO: 0.1 X10(3) UL (ref 0–0.2)
BASOPHILS NFR BLD AUTO: 0.8 %
DEPRECATED RDW RBC AUTO: 45.2 FL (ref 35.1–46.3)
EOSINOPHIL # BLD AUTO: 3.36 X10(3) UL (ref 0–0.7)
EOSINOPHIL NFR BLD AUTO: 26.9 %
ERYTHROCYTE [DISTWIDTH] IN BLOOD BY AUTOMATED COUNT: 15 % (ref 11–15)
HCT VFR BLD AUTO: 27.8 %
HGB BLD-MCNC: 8.9 G/DL
IMM GRANULOCYTES # BLD AUTO: 0.02 X10(3) UL (ref 0–1)
IMM GRANULOCYTES NFR BLD: 0.2 %
IRON SATN MFR SERPL: 11 %
IRON SERPL-MCNC: 27 UG/DL
LYMPHOCYTES # BLD AUTO: 1.3 X10(3) UL (ref 1–4)
LYMPHOCYTES NFR BLD AUTO: 10.4 %
MCH RBC QN AUTO: 26.6 PG (ref 26–34)
MCHC RBC AUTO-ENTMCNC: 32 G/DL (ref 31–37)
MCV RBC AUTO: 83.2 FL
MONOCYTES # BLD AUTO: 0.79 X10(3) UL (ref 0.1–1)
MONOCYTES NFR BLD AUTO: 6.3 %
NEUTROPHILS # BLD AUTO: 6.94 X10 (3) UL (ref 1.5–7.7)
NEUTROPHILS # BLD AUTO: 6.94 X10(3) UL (ref 1.5–7.7)
NEUTROPHILS NFR BLD AUTO: 55.4 %
PLATELET # BLD AUTO: 200 10(3)UL (ref 150–450)
RBC # BLD AUTO: 3.34 X10(6)UL
TIBC SERPL-MCNC: 255 UG/DL (ref 250–425)
TRANSFERRIN SERPL-MCNC: 171 MG/DL (ref 215–365)
WBC # BLD AUTO: 12.5 X10(3) UL (ref 4–11)

## 2024-09-21 PROCEDURE — 83540 ASSAY OF IRON: CPT

## 2024-09-21 PROCEDURE — 85025 COMPLETE CBC W/AUTO DIFF WBC: CPT

## 2024-09-21 PROCEDURE — 36415 COLL VENOUS BLD VENIPUNCTURE: CPT

## 2024-09-21 PROCEDURE — 84466 ASSAY OF TRANSFERRIN: CPT

## 2024-09-26 ENCOUNTER — TELEPHONE (OUTPATIENT)
Dept: INTERNAL MEDICINE CLINIC | Facility: CLINIC | Age: 89
End: 2024-09-26

## 2024-09-26 RX ORDER — FUROSEMIDE 20 MG
20 TABLET ORAL DAILY
Qty: 90 TABLET | Refills: 3 | Status: SHIPPED | OUTPATIENT
Start: 2024-09-26

## 2024-09-26 NOTE — TELEPHONE ENCOUNTER
Current Outpatient Medications:     furosemide 20 MG Oral Tab, Take 1 tablet (20 mg total) by mouth daily., Disp: 90 tablet, Rfl: 3

## 2024-09-27 ENCOUNTER — TELEPHONE (OUTPATIENT)
Facility: CLINIC | Age: 89
End: 2024-09-27

## 2024-09-27 ENCOUNTER — TELEPHONE (OUTPATIENT)
Dept: INTERNAL MEDICINE CLINIC | Facility: CLINIC | Age: 89
End: 2024-09-27

## 2024-09-27 DIAGNOSIS — D50.9 IRON DEFICIENCY ANEMIA, UNSPECIFIED IRON DEFICIENCY ANEMIA TYPE: Primary | ICD-10-CM

## 2024-09-27 RX ORDER — FERROUS SULFATE 325(65) MG
325 TABLET, DELAYED RELEASE (ENTERIC COATED) ORAL
Qty: 39 TABLET | Refills: 1 | Status: SHIPPED | OUTPATIENT
Start: 2024-09-27

## 2024-09-27 NOTE — TELEPHONE ENCOUNTER
Spoke with patient and results given.  Patient states he prefers to discuss this in office with Dr Rodriguez during his appointment 10/14/24

## 2024-09-27 NOTE — TELEPHONE ENCOUNTER
FERROUS SULF 325 MG TAB    New script with no details          ferrous sulfate 325 (65 FE) MG Oral Tab EC, Take 1 tablet (325 mg total) by mouth 3 (three) times a week., Disp: 39 tablet, Rfl: 1

## 2024-09-27 NOTE — TELEPHONE ENCOUNTER
Furosemide was sent to [facility] pharmacy as requested. Please notify patient     Outpatient Medication Detail     Disp Refills Start End    furosemide 20 MG Oral Tab 90 tablet 3 9/16/2024 --    Sig - Route: Take 1 tablet (20 mg total) by mouth daily. - Oral    Sent to pharmacy as: Furosemide 20 MG Oral Tablet (Lasix)    E-Prescribing Status: Receipt confirmed by pharmacy (9/16/2024  3:34 PM CDT)      Pharmacy    [id number] - [place], [place] - [address] AT [facility], [phone], [phone]

## 2024-09-27 NOTE — TELEPHONE ENCOUNTER
Please call the patient.  The blood work shows a worsening iron deficiency anemia.  Hemoglobin is now at 8.9.  Please make sure the patient is taking his iron pills on a daily basis.  If indeed he has been taking these pills, then we need to consider IV iron infusion nutrition in the form of Venafor.  It looks like he had this sort of iron infusion back in 2021.  If he is okay with proceeding with this, we can start setting him up for the iron infusion.

## 2024-09-27 NOTE — TELEPHONE ENCOUNTER
Patient requesting pharmacy change to [facility]       Outpatient Medication Detail     Disp Refills Start End    ferrous sulfate 325 (65 FE) MG Oral Tab EC 39 tablet 1 9/9/2024 --    Sig - Route: Take 1 tablet (325 mg total) by mouth 3 (three) times a week. - Oral    Sent to pharmacy as: Ferrous Sulfate 325 (65 Fe) MG Oral Tablet Delayed Release    E-Prescribing Status: Receipt confirmed by pharmacy (9/9/2024  8:45 PM CDT)      Pharmacy    [id number] [place] [place] [facility] [address] [facility], [phone], [phone]

## 2024-09-27 NOTE — TELEPHONE ENCOUNTER
Patient calling to state he spoke with [facility] today and they don't have furosemide prescription. Noted on medication record prescription sent yesterday.  Spoke with [facility] rep and they do have the prescription and are in the process of filling it.    Patient informed and verbalizes understanding.

## 2024-10-01 NOTE — TELEPHONE ENCOUNTER
Daughter, Samina, on release of information calling for test results. Verified Patient's name and date of birth.  Results relayed to daughter, per daughter, Patient is taking Iron pills 2in the morning and 1 in the evening. Would like for Patient to start iron infusions.    Please advise. Physician will need to fill out form for IV iron infusion  IV IRON - Physicians, please fill out this form.    [email or web address]

## 2024-10-04 NOTE — TELEPHONE ENCOUNTER
Form reviewed.  He would probably be best for the patient to see a hematology blood specialist.  They are much more adept and capable at taking the patient through the proper steps to get the IV iron infused in the proper way and at the proper dosing.  I have placed the referral.  He can set up an appointment with one of the hematology oncology doctors at the [facility].

## 2024-10-07 NOTE — TELEPHONE ENCOUNTER
I called Samina/daughter (Last signed Verbal Release verified) and made aware of Dr. Rodriguez's interpretation and recommendations. Contact information for referral placed was conveyed and she was transferred to department to schedule visit. She verbalized understanding. No further questions or concerns at this time.

## 2024-10-08 ENCOUNTER — OFFICE VISIT (OUTPATIENT)
Dept: PULMONOLOGY | Facility: CLINIC | Age: 89
End: 2024-10-08

## 2024-10-08 ENCOUNTER — LAB ENCOUNTER (OUTPATIENT)
Dept: LAB | Facility: HOSPITAL | Age: 89
End: 2024-10-08
Attending: INTERNAL MEDICINE
Payer: MEDICARE

## 2024-10-08 ENCOUNTER — TELEPHONE (OUTPATIENT)
Dept: INTERNAL MEDICINE CLINIC | Facility: CLINIC | Age: 89
End: 2024-10-08

## 2024-10-08 VITALS
DIASTOLIC BLOOD PRESSURE: 53 MMHG | HEIGHT: 60 IN | SYSTOLIC BLOOD PRESSURE: 136 MMHG | WEIGHT: 107.63 LBS | OXYGEN SATURATION: 94 % | HEART RATE: 63 BPM | BODY MASS INDEX: 21.13 KG/M2 | RESPIRATION RATE: 16 BRPM

## 2024-10-08 DIAGNOSIS — R05.2 SUBACUTE COUGH: Primary | ICD-10-CM

## 2024-10-08 DIAGNOSIS — R53.83 MALAISE AND FATIGUE: ICD-10-CM

## 2024-10-08 DIAGNOSIS — R05.9 COUGH, UNSPECIFIED TYPE: Primary | ICD-10-CM

## 2024-10-08 DIAGNOSIS — D50.9 IRON DEFICIENCY ANEMIA, UNSPECIFIED IRON DEFICIENCY ANEMIA TYPE: ICD-10-CM

## 2024-10-08 DIAGNOSIS — R53.81 MALAISE AND FATIGUE: ICD-10-CM

## 2024-10-08 LAB
ALBUMIN SERPL-MCNC: 4.2 G/DL (ref 3.2–4.8)
ALBUMIN/GLOB SERPL: 1.6 {RATIO} (ref 1–2)
ALP LIVER SERPL-CCNC: 98 U/L
ALT SERPL-CCNC: 11 U/L
ANION GAP SERPL CALC-SCNC: 9 MMOL/L (ref 0–18)
AST SERPL-CCNC: 8 U/L (ref ?–34)
BASOPHILS # BLD AUTO: 0.09 X10(3) UL (ref 0–0.2)
BASOPHILS NFR BLD AUTO: 0.8 %
BILIRUB SERPL-MCNC: 0.5 MG/DL (ref 0.2–1.1)
BUN BLD-MCNC: 21 MG/DL (ref 9–23)
BUN/CREAT SERPL: 17.8 (ref 10–20)
CALCIUM BLD-MCNC: 9.5 MG/DL (ref 8.7–10.4)
CHLORIDE SERPL-SCNC: 109 MMOL/L (ref 98–112)
CO2 SERPL-SCNC: 22 MMOL/L (ref 21–32)
CREAT BLD-MCNC: 1.18 MG/DL
DEPRECATED HBV CORE AB SER IA-ACNC: 64 NG/ML
DEPRECATED RDW RBC AUTO: 42.1 FL (ref 35.1–46.3)
EGFRCR SERPLBLD CKD-EPI 2021: 60 ML/MIN/1.73M2 (ref 60–?)
EOSINOPHIL # BLD AUTO: 2.75 X10(3) UL (ref 0–0.7)
EOSINOPHIL NFR BLD AUTO: 25.1 %
ERYTHROCYTE [DISTWIDTH] IN BLOOD BY AUTOMATED COUNT: 14.5 % (ref 11–15)
FASTING STATUS PATIENT QL REPORTED: NO
GLOBULIN PLAS-MCNC: 2.7 G/DL (ref 2–3.5)
GLUCOSE BLD-MCNC: 215 MG/DL (ref 70–99)
HCT VFR BLD AUTO: 28.4 %
HGB BLD-MCNC: 9.3 G/DL
IMM GRANULOCYTES # BLD AUTO: 0.04 X10(3) UL (ref 0–1)
IMM GRANULOCYTES NFR BLD: 0.4 %
IRON SATN MFR SERPL: 21 %
IRON SERPL-MCNC: 51 UG/DL
LYMPHOCYTES # BLD AUTO: 0.97 X10(3) UL (ref 1–4)
LYMPHOCYTES NFR BLD AUTO: 8.9 %
MCH RBC QN AUTO: 26.7 PG (ref 26–34)
MCHC RBC AUTO-ENTMCNC: 32.7 G/DL (ref 31–37)
MCV RBC AUTO: 81.6 FL
MONOCYTES # BLD AUTO: 0.59 X10(3) UL (ref 0.1–1)
MONOCYTES NFR BLD AUTO: 5.4 %
NEUTROPHILS # BLD AUTO: 6.5 X10 (3) UL (ref 1.5–7.7)
NEUTROPHILS # BLD AUTO: 6.5 X10(3) UL (ref 1.5–7.7)
NEUTROPHILS NFR BLD AUTO: 59.4 %
OSMOLALITY SERPL CALC.SUM OF ELEC: 299 MOSM/KG (ref 275–295)
PLATELET # BLD AUTO: 277 10(3)UL (ref 150–450)
POTASSIUM SERPL-SCNC: 4.8 MMOL/L (ref 3.5–5.1)
PROT SERPL-MCNC: 6.9 G/DL (ref 5.7–8.2)
RBC # BLD AUTO: 3.48 X10(6)UL
SODIUM SERPL-SCNC: 140 MMOL/L (ref 136–145)
TIBC SERPL-MCNC: 247 UG/DL (ref 250–425)
TRANSFERRIN SERPL-MCNC: 166 MG/DL (ref 215–365)
WBC # BLD AUTO: 10.9 X10(3) UL (ref 4–11)

## 2024-10-08 PROCEDURE — 1126F AMNT PAIN NOTED NONE PRSNT: CPT | Performed by: INTERNAL MEDICINE

## 2024-10-08 PROCEDURE — 84466 ASSAY OF TRANSFERRIN: CPT | Performed by: INTERNAL MEDICINE

## 2024-10-08 PROCEDURE — 99213 OFFICE O/P EST LOW 20 MIN: CPT | Performed by: INTERNAL MEDICINE

## 2024-10-08 PROCEDURE — 85025 COMPLETE CBC W/AUTO DIFF WBC: CPT | Performed by: INTERNAL MEDICINE

## 2024-10-08 PROCEDURE — 85060 BLOOD SMEAR INTERPRETATION: CPT | Performed by: INTERNAL MEDICINE

## 2024-10-08 PROCEDURE — 36415 COLL VENOUS BLD VENIPUNCTURE: CPT | Performed by: INTERNAL MEDICINE

## 2024-10-08 PROCEDURE — 83540 ASSAY OF IRON: CPT | Performed by: INTERNAL MEDICINE

## 2024-10-08 PROCEDURE — 1159F MED LIST DOCD IN RCRD: CPT | Performed by: INTERNAL MEDICINE

## 2024-10-08 PROCEDURE — 80053 COMPREHEN METABOLIC PANEL: CPT | Performed by: INTERNAL MEDICINE

## 2024-10-08 PROCEDURE — 3008F BODY MASS INDEX DOCD: CPT | Performed by: INTERNAL MEDICINE

## 2024-10-08 PROCEDURE — 3075F SYST BP GE 130 - 139MM HG: CPT | Performed by: INTERNAL MEDICINE

## 2024-10-08 PROCEDURE — 82728 ASSAY OF FERRITIN: CPT | Performed by: INTERNAL MEDICINE

## 2024-10-08 PROCEDURE — 3078F DIAST BP <80 MM HG: CPT | Performed by: INTERNAL MEDICINE

## 2024-10-08 NOTE — TELEPHONE ENCOUNTER
Dr Rodriguez=see below, requesting for your recommendation.     Per daughter (СВЕТЛАНА), patient has trouble getting enough sleep last night and both knees down to the  legs feels heavy this morning . Right leg is slightly swollen than the left , which is normal for the patient. Swelling going  down towards the day .     Denied shortness of breath, no cp.  Patient is anemic, he is also weak.  He saw Dr Pérez this morning and blood test were done.   Dr Pérez is aware of the above symptoms per daughter .  NO other symptoms reported .      He has an appointment to see the hematologist for the iron infusion this Friday . Patient is taking iron pills twice  day .       Daughter would like to know the exact numbers of the  lab results , (see below) specific numbers provided per request .  Informed that HGB and iron level are  better compared last month BUT, the  transferrin and total iron binding capacity are lower than before.  Informed that there is one more test that still in process (scan slide ) .  Per daughter, Dr Pérez ordered the labs in preparation for the upcoming hematology appointment .     Instructed daughter to elevate the legs, make sure that patient  is hydrated, any worsening symptoms can bring to ED .         10/8/24  1:11 PM    Iron  65 - 175 ug/dL 51 Low    Transferrin  215 - 365 mg/dL 166 Low    Total Iron Binding Capacity  250 - 425 ug/dL 247 Low        Component  Ref Range & Units 10/8/24  1:11 PM   WBC  4.0 - 11.0 x10(3) uL 10.9   RBC  3.80 - 5.80 x10(6)uL 3.48 Low    HGB  13.0 - 17.5 g/dL 9.3 Low    HCT  39.0 - 53.0 % 28.4 Low                Future Appointments   Date Time Provider Department Center   10/11/2024  8:30 AM Juanito Caruso MD [facility] HEM ONC EMO   10/14/2024  2:40 PM Priyank Rodriguez MD [facility]   10/14/2025  3:45 PM Sandip Pérez MD [facility]

## 2024-10-08 NOTE — PROGRESS NOTES
The patient is an 89-year-old male who I know well from prior evaluation comes in now for follow-up.  He notes that his cough is vastly improved.  He is accompanied today by his daughter who tells me that his respiratory syndrome is much better.  He underwent video swallow which demonstrated 1 episode of penetration without gross aspiration.  He will get a follow-up CAT scan in July of next year.    Review of Systems:  Vision normal. Ear nose and throat normal. Bowel normal. Bladder function normal. No depression. No thyroid disease. No lymphatic system concerns.  No rash. Muscles and joints unremarkable. No weight loss no weight gain.    Physical Examination:  Vital signs normal. HEENT examination is unremarkable with pupils equal round and reactive to light and accommodation. Neck without adenopathy, thyromegaly, JVD nor bruit. Lungs slightly diminished with a couple expiratory rattles to auscultation and percussion. Cardiac regular rate and rhythm no murmur. Abdomen nontender, without hepatosplenomegaly and no mass appreciable. Extremities and Musculoskeletal without clubbing cyanosis nor edema, and mobility acceptable. Neurologic grossly intact with symmetric tone and strength and reflex.    Assessment and plan:  1.  Chronic cough-vastly improved.  Responded to Levaquin and prednisone.  Still has a subtle rattle in the chest but symptomatically is good.  He needs to be careful with swallowing.    2.  Pulmonary nodules-will repeat CT scan of the chest next July    3.  Malaise and fatigue-has a history of iron deficiency anemia with recent hemoglobin on the eights.  Will send CBC, CMP, iron and TIBC and the patient to follow-up with hematology.

## 2024-10-09 ENCOUNTER — TELEPHONE (OUTPATIENT)
Dept: PULMONOLOGY | Facility: CLINIC | Age: 89
End: 2024-10-09

## 2024-10-09 NOTE — TELEPHONE ENCOUNTER
I agree with the nurses assessment and advice in this case.  Patient will be seeing hematology shortly.  Consideration of IV iron supplementation.  I would not do anything different based on the message received other than continue current treatment and follow-up as scheduled with myself and the specialists.

## 2024-10-10 NOTE — TELEPHONE ENCOUNTER
Advised daughter Samina(on HIPAA) of Dr. Rodriguez's note. Daughter verbalized understanding.  Future Appointments   Date Time Provider Department Center   10/11/2024  8:30 AM Juanito Caruso MD [facility] HEM ONC EMO   10/14/2024  2:40 PM Priyank Rodriguez MD [facility]   10/14/2025  3:45 PM Sandip Pérez MD [facility]

## 2024-10-11 ENCOUNTER — OFFICE VISIT (OUTPATIENT)
Dept: HEMATOLOGY/ONCOLOGY | Facility: HOSPITAL | Age: 89
End: 2024-10-11
Attending: INTERNAL MEDICINE
Payer: MEDICARE

## 2024-10-11 VITALS
RESPIRATION RATE: 16 BRPM | BODY MASS INDEX: 20.48 KG/M2 | DIASTOLIC BLOOD PRESSURE: 51 MMHG | SYSTOLIC BLOOD PRESSURE: 141 MMHG | HEART RATE: 61 BPM | WEIGHT: 104.31 LBS | HEIGHT: 60 IN | OXYGEN SATURATION: 95 % | TEMPERATURE: 99 F

## 2024-10-11 DIAGNOSIS — D50.8 OTHER IRON DEFICIENCY ANEMIA: Primary | ICD-10-CM

## 2024-10-11 DIAGNOSIS — N18.31 STAGE 3A CHRONIC KIDNEY DISEASE (HCC): ICD-10-CM

## 2024-10-11 PROCEDURE — 99204 OFFICE O/P NEW MOD 45 MIN: CPT | Performed by: INTERNAL MEDICINE

## 2024-10-11 NOTE — CONSULTS
[facility] Hematology  Consultation Note    Patient Name: Mohamud Fitzpatrick   YOB: 1935   Medical Record Number: [id number]   [facility]: [id number]   Consulting Physician: Juanito Caruso MD  Referring Physician(s): Priyank Rodriguez MD   Date of Consultation: 10/11/2024     Reason for Consultation:    1. Other iron deficiency anemia    2. Stage 3a chronic kidney disease (HCC)        History of Present Illness:   Mohamud Fitzpatrick is a 89 year old male seen today in the Hematology Clinic  for normocytic normochromic anemia.. CBC done on 10/8/2024 showed WBC 10.9 K, platelet count of 277 K and Hemoglobin and Hematocrit of 9.3/28.4.  WBC differential showed mild eosinophilia.  He was recently diagnosed with pneumonia and treated with oral antibiotics as an outpatient.     Iron studies showed a decreased iron saturation of 11%.  Serum iron is 27.  Transferrin was low at 171.  He has been taking oral ferrous sulfate twice a day.  I reviewed his labs and he has had mild normocytic normochromic anemia dating back to 2020.  No prior history of peptic ulcer disease. He has a remote history of colorectal cancer status post partial colectomy.  Last colonoscopy was several years ago.    He  has not received blood transfusion before. He has noted no swollen glands in neck, axillary or inguinal areas, no fevers, weight loss, night sweats, anorexia or pruritus. There is no family history of any hematologic neoplasm.     He had to have his teeth removed and has well-fitting dentures that has limited his ability to eat much solid food.  He likes vegetables but tries to eat red meat occasionally.  Has lost some weight as a result.       Past Medical History:  Past Medical History:    Acute congestive heart failure, unspecified heart failure type (HCC)    Coronary atherosclerosis    Diabetes (HCC)    Essential hypertension    Former smoker    High blood pressure    History of quadruple bypass    Pressure ulcer        Past Surgical History:  Past Surgical History:   Procedure Laterality Date    Cabg  05/2009    Carotid endarterectomy      Colonoscopy      Other surgical history         Family Medical History:  Family History   Problem Relation Age of Onset    No Known Problems Father     Other (Other) Mother     Heart Disorder Brother        Gyne History:      Social History:  Social History     Socioeconomic History    Marital status:      Spouse name: Not on file    Number of children: Not on file    Years of education: Not on file    Highest education level: Not on file   Occupational History    Not on file   Tobacco Use    Smoking status: Former     Passive exposure: Past    Smokeless tobacco: Never   Vaping Use    Vaping status: Never Used   Substance and Sexual Activity    Alcohol use: Never    Drug use: Never    Sexual activity: Not Currently     Partners: Female   Other Topics Concern    Not on file   Social History Narrative    Not on file     Social Drivers of Health     Financial Resource Strain: Low Risk  (9/29/2021)    Received from [facility]    Overall Financial Resource Strain (CARDIA)     Difficulty of Paying Living Expenses: Not hard at all   Food Insecurity: Not on file   Transportation Needs: No Transportation Needs (9/29/2021)    [facility]    PRAPARE - Transportation     Lack of Transportation (Medical): No     Lack of Transportation (Non-Medical): No   Physical Activity: Not on file   Stress: Not on file   Social Connections: Not on file   Housing Stability: Not on file       Allergies:   Allergies[1]    Current Medications:   ferrous sulfate 325 (65 FE) MG Oral Tab EC Take 1 tablet (325 mg total) by mouth 3 (three) times a week. (Patient taking differently: Take 1 tablet (325 mg total) by mouth 2 (two) times daily.) 39 tablet 1    furosemide 20 MG Oral Tab Take 1 tablet (20 mg total) by mouth daily. 90 tablet 3     Glucose Blood (ACCU-CHEK SMARTVIEW) In Vitro Strip Use 1 (one) new strip twice daily for blood glucose monitoring 200 strip 3    amLODIPine 10 MG Oral Tab Take 1 tablet (10 mg total) by mouth daily. 90 tablet 3    TRUEplus Lancets 33G Does not apply Misc 1 Lancet by Finger stick route 2 (two) times daily. 200 each 3    metoprolol succinate  MG Oral Tablet 24 Hr Take 1.5 tablets (150 mg total) by mouth daily. 135 tablet 3    clopidogrel 75 MG Oral Tab Take 1 tablet (75 mg total) by mouth daily. 90 tablet 1    STIMULANT LAXATIVE 8.6-50 MG Oral Tab Take 1 tablet by mouth daily. 90 tablet 3    terazosin 2 MG Oral Cap Take 1 capsule (2 mg total) by mouth nightly. 90 capsule 3    losartan 50 MG Oral Tab Take 1 tablet (50 mg total) by mouth daily. 90 tablet 1    allopurinol 300 MG Oral Tab Take 1 tablet (300 mg total) by mouth daily. 90 tablet 3    Neomycin-Polymyxin-Dexameth 3.5-98968-0.1 Ophthalmic Suspension 2 (two) times daily.      glipiZIDE-metFORMIN HCl 2.5-500 MG Oral Tab Take 1 tablet by mouth 2 (two) times daily with meals. 180 tablet 3    cyanocobalamine 1000 MCG Oral Tab Take 1 tablet (1,000 mcg total) by mouth daily. 30 tablet 0    atorvastatin 40 MG Oral Tab Take 1 tablet (40 mg total) by mouth nightly.      Glucose Blood In Vitro Strip Check glucose once daily      Dorzolamide HCl-Timolol Mal 22.3-6.8 MG/ML Ophthalmic Solution Place 1 drop into both eyes 2 (two) times daily.      LUMIGAN 0.01 % Ophthalmic Solution Place 1 drop into both eyes every evening.         Review of Systems:  Pertinent positives and negatives noted in the the HPI.     Physical Examination:  /51 (BP Location: Left arm, Patient Position: Sitting, Cuff Size: child)   Pulse 61   Temp 98.6 °F (37 °C) (Oral)   Resp 16   Ht 1.473 m (4' 10\")   Wt 47.3 kg (104 lb 4.8 oz)   SpO2 95%   BMI 21.80 kg/m²     General: Patient is alert and oriented x 3, not in acute distress.  cachectic  HEENT: EOMs intact. PERRL. Oropharynx is  clear.   Neck: No JVD. No palpable lymphadenopathy. Neck is supple.  Lymphatics: There is no palpable lymphadenopathy throughout in the cervical, supraclavicular, axillary, or inguinal regions.  Chest: Clear to auscultation. No wheezes or rales.  Heart: Regular rate and rhythm. S1S2 normal.  Extremities: No edema or calf tenderness.   Neurological: Grossly intact. Uses a walker to ambulate    Labs:    Lab Results   Component Value Date/Time    WBC 10.9 10/08/2024 01:11 PM    RBC 3.48 (L) 10/08/2024 01:11 PM    HGB 9.3 (L) 10/08/2024 01:11 PM    HCT 28.4 (L) 10/08/2024 01:11 PM    MCV 81.6 10/08/2024 01:11 PM    MCH 26.7 10/08/2024 01:11 PM    MCHC 32.7 10/08/2024 01:11 PM    RDW 14.5 10/08/2024 01:11 PM    NEPRELIM 6.50 10/08/2024 01:11 PM    .0 10/08/2024 01:11 PM       Impression:  Diagnosis  1. Other iron deficiency anemia    2. Stage 3a chronic kidney disease (HCC)      Plan:  I suspect his anemia is likely secondary to mild iron deficiency, chronic kidney disease as well as anemia of chronic inflammation due to underlying diabetes and other comorbidities.     since he has persistent iron deficiency despite oral ferrous sulfate would recommend a trial of parenteral iron therapy.  Will plan to start INFeD next week.  Anticipated side effects including infusion reactions were discussed.  Repeat CBC iron studies LDH B12 and folate in 6 to 8 weeks  If he has persistent anemia despite correction of iron deficiency may need to consideerythropoietin therapy given his chronic kidney disease     Thank you Dr Priyank Rodriguez MD for the opportunity to participate in the care of this interesting patient. Please do contact me if I may be of any further assistance    Juanito Caruso MD  [facility] Hematology/Oncology           [1] No Known Allergies

## 2024-10-15 ENCOUNTER — APPOINTMENT (OUTPATIENT)
Dept: GENERAL RADIOLOGY | Facility: HOSPITAL | Age: 89
End: 2024-10-15
Attending: STUDENT IN AN ORGANIZED HEALTH CARE EDUCATION/TRAINING PROGRAM
Payer: MEDICARE

## 2024-10-15 ENCOUNTER — HOSPITAL ENCOUNTER (EMERGENCY)
Facility: HOSPITAL | Age: 89
Discharge: HOME | End: 2024-10-15
Attending: STUDENT IN AN ORGANIZED HEALTH CARE EDUCATION/TRAINING PROGRAM
Payer: MEDICARE

## 2024-10-15 VITALS
HEART RATE: 64 BPM | RESPIRATION RATE: 16 BRPM | SYSTOLIC BLOOD PRESSURE: 120 MMHG | TEMPERATURE: 98 F | OXYGEN SATURATION: 99 % | BODY MASS INDEX: 20.42 KG/M2 | DIASTOLIC BLOOD PRESSURE: 55 MMHG | HEIGHT: 60 IN | WEIGHT: 104 LBS

## 2024-10-15 DIAGNOSIS — M79.671 RIGHT FOOT PAIN: Primary | ICD-10-CM

## 2024-10-15 PROCEDURE — 99283 EMERGENCY DEPT VISIT LOW MDM: CPT

## 2024-10-15 PROCEDURE — 99284 EMERGENCY DEPT VISIT MOD MDM: CPT

## 2024-10-15 PROCEDURE — 73630 X-RAY EXAM OF FOOT: CPT | Performed by: STUDENT IN AN ORGANIZED HEALTH CARE EDUCATION/TRAINING PROGRAM

## 2024-10-15 RX ORDER — TRAMADOL HYDROCHLORIDE 50 MG/1
TABLET ORAL EVERY 8 HOURS PRN
Qty: 10 TABLET | Refills: 0 | Status: SHIPPED | OUTPATIENT
Start: 2024-10-15 | End: 2024-10-20

## 2024-10-15 NOTE — DISCHARGE INSTRUCTIONS
Take Tylenol or ibuprofen as needed for pain  Call the podiatrist for follow-up  Return to the ER for numbness weakness or tingling worsening pain difficulty walking or any new concerns

## 2024-10-15 NOTE — ED INITIAL ASSESSMENT (HPI)
Mohamud arrived through triage with his daughter for c/o atraumatic pain to the top of his R foot. He reports \"heavy feeling legs\" intermittently for the past week or so. Chronic BLE swelling reported by daughter (intermittent and progressive throughout the day). He denies chest discomfort or dyspnea. He is ambulatory with a cane.

## 2024-10-15 NOTE — ED QUICK NOTES
Pt to ED with daughter for c/o atraumatic right dorsal foot pain since approx. 1am. Foot appears pink, warm, and dry. CMS in tact. Patient ambulatory to ED room. Denies torsten, chest pain. Daughter states he has been having BL leg \"heaviness\" lately, seeing PCP for. Denies any current leg \"heaviness\".

## 2024-10-16 ENCOUNTER — PATIENT OUTREACH (OUTPATIENT)
Dept: CASE MANAGEMENT | Age: 89
End: 2024-10-16

## 2024-10-16 ENCOUNTER — TELEPHONE (OUTPATIENT)
Dept: INTERNAL MEDICINE CLINIC | Facility: CLINIC | Age: 89
End: 2024-10-16

## 2024-10-16 DIAGNOSIS — Z02.9 ENCOUNTERS FOR UNSPECIFIED ADMINISTRATIVE PURPOSE: Primary | ICD-10-CM

## 2024-10-16 PROCEDURE — 1111F DSCHRG MED/CURRENT MED MERGE: CPT

## 2024-10-16 NOTE — PROGRESS NOTES
Transitions of Care team has contacted patient and patient needs additional appointments.  Please contact patient for assistance with scheduling a follow-up appointment for  podiatry .  Thank you!    Specialty: PODIATRIST Contact: 5 [address] [address] [phone]     Please call daughter Samina to coordinate appointment.

## 2024-10-16 NOTE — PROGRESS NOTES
TCM request (discharged 10/15)    Dr Heriberto Pantoja  PODIATRIST   [facility] [place]  [address] [address]   [phone]   Apt made:  Tue 10/29 @4:00pm  Patient does need a referral sent to the office (fax #[phone]) - needs to arrive 30 minutes prior to appointment, bring Insurance Card, Photo ID & list of medications and discharge papers  Confirmed w/patient's daughterSamina  Closing encounter

## 2024-10-16 NOTE — PROGRESS NOTES
Transitions of Care Navigation  Discharge Date: 10/15/24  Contact Date: 10/16/2024    Transitions of Care Assessment:  MUNIRA Initial Assessment    General:  Assessment completed with: Patient;Children  Patient Subjective: Spoke with patient.  He reports no pain in his foot right now. He has picked up his tramadol and last dose was last night.  His foot pain is improving.  Advised patient that tramadol can make him drowsy therefore he should not be driving when he is taking the medication.  Regarding follow up appointment patient asked that I call his daughter Samina.  Spoke with daughter she has not scheduled podiatry appointment.  Offered TST and she agreed.  Encounter sent to [facility] discharge scheduling team to help coordinate follow up for patient. Reviewed medications with daughter. Daughter reports patient does monitor his blood sugars often as well as his blood pressure.  She does not know currently readings.  Daughter advised to have patient return to emergency room if foot pain worsens, foot becomes swollen and red.  Daughter denies that patient has any additional symptoms.  Chief Complaint: Right foot pain  Verify patient name and  with patient/ caregiver: Yes    Hospital Stay/Discharge:  Tell me what you understand of why you were in the hospital or emergency department: I had right foot pain  Prior to leaving the hospital were your Discharge Instructions reviewed with you?: Yes  Did you receive a copy of your written Discharge Instructions?: Yes  What questions do you have about your Discharge Instructions?: Patient and daughter did not have any additional questions.  Do you feel better or worse since you left the hospital or emergency department?: Better    Follow - Up Appointment:  Do you have a follow-up appointment?: No  Are there any barriers to getting to your follow-up appointment?: No    Home Health/DME:  Prior to leaving the hospital was Home Health (HH) arranged for you?: N/A  Are HH needs  identified by staff during the assessment?: No     Prior to leaving the hospital or emergency department was Durable Medical Equipment (DME), medical supplies, or infusions arranged for you?: N/A  Are DME/medical supply/infusions needs identified by staff during this assessment?: No     Medications/Diet:  Did any of your medications change, during or after your hospital stay or ED visit?: No  Do you understand what your medications are for and possible side effects?: Yes  Are there any reasons that keep you from taking your medication as prescribed?: No  Any concerns about medication refills?: No    Were you given a different diet per your Discharge Instructions?: No  Reason: N/A     Questions/Concerns:  Do you have any questions or concerns that have not been discussed?: No       Nursing Interventions:  Assessed pain. Reviewed medication and side affects. Telephone encounter sent to primary care physician.  Encounter sent to TST. Advised when to return to emergency room.       Medications:  Reviewed medications with patient's daughter.  Also, discussed new medication and potential side effects.  Patient's daughter did not have any additional questions.     Current Outpatient Medications   Medication Sig Dispense Refill    traMADol 50 MG Oral Tab Take 1-2 tablets ( mg total) by mouth every 8 (eight) hours as needed for Pain. 10 tablet 0    ferrous sulfate 325 (65 FE) MG Oral Tab EC Take 1 tablet (325 mg total) by mouth 3 (three) times a week. (Patient taking differently: Take 1 tablet (325 mg total) by mouth 2 (two) times daily.) 39 tablet 1    furosemide 20 MG Oral Tab Take 1 tablet (20 mg total) by mouth daily. 90 tablet 3    Glucose Blood (ACCU-CHEK SMARTVIEW) In Vitro Strip Use 1 (one) new strip twice daily for blood glucose monitoring 200 strip 3    amLODIPine 10 MG Oral Tab Take 1 tablet (10 mg total) by mouth daily. 90 tablet 3    TRUEplus Lancets 33G Does not apply Misc 1 Lancet by Finger stick route 2  (two) times daily. 200 each 3    metoprolol succinate  MG Oral Tablet 24 Hr Take 1.5 tablets (150 mg total) by mouth daily. 135 tablet 3    clopidogrel 75 MG Oral Tab Take 1 tablet (75 mg total) by mouth daily. 90 tablet 1    STIMULANT LAXATIVE 8.6-50 MG Oral Tab Take 1 tablet by mouth daily. 90 tablet 3    terazosin 2 MG Oral Cap Take 1 capsule (2 mg total) by mouth nightly. 90 capsule 3    losartan 50 MG Oral Tab Take 1 tablet (50 mg total) by mouth daily. 90 tablet 1    allopurinol 300 MG Oral Tab Take 1 tablet (300 mg total) by mouth daily. 90 tablet 3    Neomycin-Polymyxin-Dexameth 3.5-93731-9.1 Ophthalmic Suspension 2 (two) times daily.      glipiZIDE-metFORMIN HCl 2.5-500 MG Oral Tab Take 1 tablet by mouth 2 (two) times daily with meals. 180 tablet 3    cyanocobalamine 1000 MCG Oral Tab Take 1 tablet (1,000 mcg total) by mouth daily. 30 tablet 0    atorvastatin 40 MG Oral Tab Take 1 tablet (40 mg total) by mouth nightly.      Glucose Blood In Vitro Strip Check glucose once daily      Dorzolamide HCl-Timolol Mal 22.3-6.8 MG/ML Ophthalmic Solution Place 1 drop into both eyes 2 (two) times daily.      LUMIGAN 0.01 % Ophthalmic Solution Place 1 drop into both eyes every evening.      triamcinolone 0.1 % External Cream Apply topically 2 (two) times daily as needed. (Patient not taking: Reported on 10/16/2024) 45 g 3           Follow-up Appointments:  Your appointments       Date & Time Appointment Department (Center)    Dec 13, 2024 8:45 AM CST HEMATOLOGY ONCOLOGY FOLLOW UP with Juanito Caruso MD [facility] Hematology Oncology ([facility])        Quinn 15, 2025 4:40 PM CST Follow Up Visit with Priyank Rodriguez MD [facility] ([facility])        Oct 14, 2025 3:45 PM CDT Follow Up Visit with Sandip Pérez MD [facility], [facility] ([facility] Hematology  Oncology  [facility]  [address]  [place] [address]  [phone] [facility], [facility], [facility]  [address]  [facility] [id number] [phone] [facility], [facility], [facility]  [address] [facility] [address] [phone]            Transitional Care Clinic  Was TCC Ordered: No      Primary Care Provider (If no TCC appointment)  Does patient already have a PCP appointment scheduled? No  Nurse Care Manager Attempted to schedule PCP office ER Follow-up appointment with patient   -If no appointment scheduled: wants to see podiatry    Specialist  Does the patient have any other follow-up appointment(s) need to be scheduled? Yes   -If yes: Nurse Care Manager reviewed upcoming specialist appointments with patient: No   -Does the patient need assistance scheduling appointment(s): Yes, message to TST team    [x]  Patient's daughter verbally agrees to additional follow-up calls from Nurse Care Manager    Book By Date: 10/22/24

## 2024-10-16 NOTE — TELEPHONE ENCOUNTER
Spoke to patient for Transitions of Care call today.  Patient does not have an appointment scheduled at this time.  ER Follow-up appointment needed by 10/22/24.  Please advise.    BOOK BY DATE: 10/22/24    Clinical staff:  Please follow-up with patient and try to get them to schedule as patient would greatly benefit from ER Follow-up.  Thank you!

## 2024-10-16 NOTE — ED PROVIDER NOTES
Patient Seen in: [facility] Emergency Department      History     Chief Complaint   Patient presents with    Foot Pain     Stated Complaint: foot pain    Subjective:   HPI      89-year-old male with history of hypertension, CAD, CHF, presenting for evaluation of right foot pain.  Onset of symptoms last night.  Describes intermittent sharp pangs of pain in the right foot and the lateral aspect of the foot around the third and fourth metatarsal.  No numbness weakness or tingling.  Able to bear weight.  No worsening of pain with walking.  No trauma to the area that he can recall.  Took Tylenol that relief of pain.    Objective:     Past Medical History:    Acute congestive heart failure, unspecified heart failure type (HCC)    Atherosclerosis of coronary artery    Congestive heart disease (HCC)    Coronary atherosclerosis    Diabetes (HCC)    Essential hypertension    Former smoker    High blood pressure    History of quadruple bypass    Pressure ulcer              Past Surgical History:   Procedure Laterality Date    Cabg  05/2009    Carotid endarterectomy      Colonoscopy      Other surgical history                  Social History     Socioeconomic History    Marital status:    Tobacco Use    Smoking status: Former     Passive exposure: Past    Smokeless tobacco: Never   Vaping Use    Vaping status: Never Used   Substance and Sexual Activity    Alcohol use: Never    Drug use: Never    Sexual activity: Not Currently     Partners: Female     Social Drivers of Health     Financial Resource Strain: Low Risk  (10/16/2024)    Financial Resource Strain     Med Affordability: No   Transportation Needs: No Transportation Needs (10/16/2024)    Transportation Needs     Lack of Transportation: No                  Physical Exam     ED Triage Vitals [10/15/24 0819]   BP (!) 161/79   Pulse 60   Resp 22   Temp 97.6 °F (36.4 °C)   Temp src Temporal   SpO2 96 %   O2 Device None (Room air)       Current Vitals:   Vital  Signs  BP: 120/55  Pulse: 64  Resp: 16  Temp: 97.6 °F (36.4 °C)  Temp src: Temporal  MAP (mmHg): 74    Oxygen Therapy  SpO2: 99 %  O2 Device: None (Room air)        Physical Exam  Constitutional: awake, alert, no sig distress  HENT: mmm, no lesions,  Neck: normal range of motion, no tenderness, supple.  Eyes: PERRL, EOMI, conjunctiva normal, no discharge. Sclera anicteric.  Cardiovascular: rr no murmur  Respiratory: Normal breath sounds, no respiratory distress, no wheezing, no chest tenderness.  GI: Bowel sounds normal, Soft, no tenderness, no masses, no pulsatile masses.  : No CVA tenderness.  Skin: Warm, dry, no erythema, no rash.  Right lower extremity exam: DP PT  pulse 1+ and symmetric, foot is warm and well-perfused.  Intermittently tender about metatarsals 3 and 4.  No appreciable swelling no redness no skin defects.  No tenderness about the more proximal ankle or leg.  Neurologic: Alert & oriented x 3, normal motor function, normal sensory function, no focal deficits noted.  Psych: Calm, cooperative, nl affect        ED Course   Labs Reviewed - No data to display         Reviewed x-ray dependently do not appreciate any acute fracture.       MDM      89-year-old male presenting with intermittent right foot pain.  On arrival vitals are stable and reassuring  -Pain is not claudicatory in nature, there is palpable pulses, low suspicion for acute limb ischemia  DDx includes foot strain, Garner's neuroma, stress fracture, neuropathy    Return precautions and follow-up instructions were discussed with patient who voiced understanding and agreement the plan.  All questions were answered to patient satisfaction.      Medical Decision Making      Disposition and Plan     Clinical Impression:  1. Right foot pain         Disposition:  Discharge  10/15/2024 11:20 am    Follow-up:  [facility] Emergency Department  [address] [facility] [id number] [phone]  Follow up  As needed, If symptoms  Heriberto Wade DPM  [address] [address]  [phone]    Call today      We recommend that you schedule follow up care with a primary care provider within the next three months to obtain basic health screening including reassessment of your blood pressure.      Medications Prescribed:  Discharge Medication List as of 10/15/2024 11:27 AM        START taking these medications    Details   traMADol 50 MG Oral Tab Take 1-2 tablets ( mg total) by mouth every 8 (eight) hours as needed for Pain., Normal, Disp-10 tablet, R-0                 Supplementary Documentation:

## 2024-10-21 ENCOUNTER — TELEPHONE (OUTPATIENT)
Dept: INTERNAL MEDICINE CLINIC | Facility: CLINIC | Age: 89
End: 2024-10-21

## 2024-10-21 NOTE — TELEPHONE ENCOUNTER
Onsite Staff patient will like to jorge l his immunization record sent to his home address. Thank you   Patient was inform of the pneumococcal vaccine he has had.

## 2024-10-23 ENCOUNTER — PATIENT OUTREACH (OUTPATIENT)
Dept: CASE MANAGEMENT | Age: 89
End: 2024-10-23

## 2024-10-24 RX ORDER — ALLOPURINOL 300 MG/1
300 TABLET ORAL DAILY
Qty: 90 TABLET | Refills: 3 | OUTPATIENT
Start: 2024-10-24

## 2024-10-24 NOTE — PROGRESS NOTES
MUNIRA Follow-up Assessment    General:  Assessment completed with: Patient  Community Resources: Other (N/A)    Progress/Care Plan:  Is the patient progressing as planned?: Yes  Care Plan Update: Spoke with patient he reports his foot pain has resolved.  He has been feeling ok.  He does not check his blood pressure at home but does check his blood sugar daily.  Today it was in the 90s.  Patient has not been taking tramadol anymore. Has podiatry appointment on 10/29.  Patient denies the following symptoms:  fever, headache, dizziness, chest pain, shortness of breath, abdominal pain, nausea or vomiting.    Patient did not have any additional questions or concerns.  New Care Plan: keep podiatry appt, continue to check blood sugars daily  Frequency/Follow Up Plan: 1 week     Notes:  Navigator Notes: podiatry notes

## 2024-10-25 DIAGNOSIS — E11.9 TYPE 2 DIABETES MELLITUS WITHOUT COMPLICATION, WITHOUT LONG-TERM CURRENT USE OF INSULIN (HCC): ICD-10-CM

## 2024-10-28 RX ORDER — GLIPIZIDE AND METFORMIN HCL 2.5; 5 MG/1; MG/1
1 TABLET, FILM COATED ORAL 2 TIMES DAILY WITH MEALS
Qty: 180 TABLET | Refills: 3 | Status: SHIPPED | OUTPATIENT
Start: 2024-10-28

## 2024-10-28 NOTE — TELEPHONE ENCOUNTER
Refill passed per [facility] protocol.  Requested Prescriptions   Pending Prescriptions Disp Refills    GLIPIZIDE-METFORMIN HCL 2.5-500 MG Oral Tab [Pharmacy Med Name: GLIPIZIDE-METFORMIN 2.5-500 MG] 180 tablet 3     Sig: TAKE 1 TABLET BY MOUTH TWICE A DAY WITH MEALS       Diabetes Medication Protocol Passed - 10/28/2024  8:18 AM        Passed - Last A1C < 7.5 and within past 6 months     Lab Results   Component Value Date    A1C 6.3 (H) 06/08/2024             Passed - In person appointment or virtual visit in the past 6 mos or appointment in next 3 mos     Recent Outpatient Visits              2 weeks ago Other iron deficiency anemia    [facility] Hematology Oncology Juanito Caurso MD    Office Visit    2 weeks ago Subacute cough    [facility] Sandip Pérez MD    Office Visit    2 months ago Oropharyngeal dysphagia    [facility] Sandip Pérez MD    Office Visit    4 months ago Type 2 diabetes mellitus without complication, without long-term current use of insulin ([facility])    [facility] Priyank Rodriguez MD    Office Visit    8 months ago Diastolic congestive heart failure, unspecified HF chronicity ([facility])    [facility] Priyank Rodriguze [facility]    Office Visit          Future Appointments         Provider Department Appt Notes    Tomorrow EM CC INFRN 2 [facility]armila ALVAREN[facility] - Infusion IRON DEXTRAN-PIV-SL    In 1 month Juanito Caruso MD [facility] Hematology Oncology Follow-up 2m-LS    In 2 months Priyank Rodriguez MD [facility]     In 11 months Sandip Pérez MD [facility] yearly                    Passed - Microalbumin procedure in past 12 months or taking ACE/ARB        Passed - EGFRCR or GFRNAA > 50     GFR  Evaluation  EGFRCR: 60 , resulted on 10/8/2024          Passed - GFR in the past 12 months

## 2024-10-29 ENCOUNTER — OFFICE VISIT (OUTPATIENT)
Dept: HEMATOLOGY/ONCOLOGY | Facility: HOSPITAL | Age: 89
End: 2024-10-29
Attending: INTERNAL MEDICINE
Payer: MEDICARE

## 2024-10-29 VITALS
HEART RATE: 64 BPM | OXYGEN SATURATION: 95 % | WEIGHT: 104.38 LBS | RESPIRATION RATE: 16 BRPM | BODY MASS INDEX: 23 KG/M2 | SYSTOLIC BLOOD PRESSURE: 137 MMHG | DIASTOLIC BLOOD PRESSURE: 68 MMHG | TEMPERATURE: 98 F

## 2024-10-29 DIAGNOSIS — D50.8 OTHER IRON DEFICIENCY ANEMIA: Primary | ICD-10-CM

## 2024-10-29 PROCEDURE — 96376 TX/PRO/DX INJ SAME DRUG ADON: CPT

## 2024-10-29 PROCEDURE — 96365 THER/PROPH/DIAG IV INF INIT: CPT

## 2024-10-29 NOTE — PROGRESS NOTES
Patient here for iron dextran infusion, arrives ambulating with cane accompanied by family member. Patient denies any issues or concerns. Oriented patient to infusion area and discussed iron infusion process including administration of test dose. Reviewed s/s of adverse reaction and possible side effects.     Ordering Provider: Shady  Order Exp: after this dose      Test dose administered over 2 mins with free flowing NS, patient monitored for 15mins following admin. No s/s of adverse reaction. Iron dextran full dose administered over 1hr, patient tolerated infusion without difficulty or complaint. Post infusion VSS. Reviewed next apt date/time: MD follow-up 12/13 8:45am, patient's family member aware to have labs drawn week of MD follow-up. Orders active in order inquiry.    Education Record  Learner:  Patient and Family Member  Disease / Diagnosis: ROCCO  Barriers / Limitations:  None  Method:  Reinforcement  General Topics:  Medication and Plan of care reviewed  Outcome:  Shows understanding

## 2024-10-31 ENCOUNTER — PATIENT OUTREACH (OUTPATIENT)
Dept: CASE MANAGEMENT | Age: 89
End: 2024-10-31

## 2024-10-31 NOTE — PROGRESS NOTES
MUNIRA Follow-up Assessment    General:  Assessment completed with: Patient  Community Resources: Other (N/a)    Progress/Care Plan:  Is the patient progressing as planned?: Yes  Care Plan Update: Spoke with patient.  His right foot pain resolved.  Podiatry appointment on 10/29 was canceled since he was no longer having pain.  Goals met therefore MUNIRA being closed. Patient agreed. Primary care physician notified.  New Care Plan: Schedule appt with podiatrist if foot pain returns  Frequency/Follow Up Plan: MUNIRA closed as goals have been met     Notes:  Navigator Notes: PCP updated.

## 2024-12-04 NOTE — TELEPHONE ENCOUNTER
Current Outpatient Medications:       amLODIPine 10 MG Oral Tab, Take 1 tablet (10 mg total) by mouth daily., Disp: 90 tablet, Rfl: 3

## 2024-12-07 ENCOUNTER — LAB ENCOUNTER (OUTPATIENT)
Dept: LAB | Facility: HOSPITAL | Age: 89
End: 2024-12-07
Attending: INTERNAL MEDICINE
Payer: MEDICARE

## 2024-12-07 DIAGNOSIS — D50.8 OTHER IRON DEFICIENCY ANEMIA: ICD-10-CM

## 2024-12-07 LAB
BASOPHILS # BLD AUTO: 0.06 X10(3) UL (ref 0–0.2)
BASOPHILS NFR BLD AUTO: 0.7 %
DEPRECATED HBV CORE AB SER IA-ACNC: 195 NG/ML
DEPRECATED RDW RBC AUTO: 47.1 FL (ref 35.1–46.3)
EOSINOPHIL # BLD AUTO: 1.8 X10(3) UL (ref 0–0.7)
EOSINOPHIL NFR BLD AUTO: 19.7 %
ERYTHROCYTE [DISTWIDTH] IN BLOOD BY AUTOMATED COUNT: 15.4 % (ref 11–15)
FOLATE SERPL-MCNC: 12.3 NG/ML (ref 5.4–?)
HCT VFR BLD AUTO: 28.3 %
HGB BLD-MCNC: 8.9 G/DL
HGB RETIC QN AUTO: 28.5 PG (ref 28.2–36.6)
IMM GRANULOCYTES # BLD AUTO: 0.03 X10(3) UL (ref 0–1)
IMM GRANULOCYTES NFR BLD: 0.3 %
IMM RETICS NFR: 0.12 RATIO (ref 0.1–0.3)
IRON SATN MFR SERPL: 12 %
IRON SERPL-MCNC: 28 UG/DL
LDH SERPL L TO P-CCNC: 159 U/L
LYMPHOCYTES # BLD AUTO: 0.88 X10(3) UL (ref 1–4)
LYMPHOCYTES NFR BLD AUTO: 9.6 %
MCH RBC QN AUTO: 26.6 PG (ref 26–34)
MCHC RBC AUTO-ENTMCNC: 31.4 G/DL (ref 31–37)
MCV RBC AUTO: 84.7 FL
MONOCYTES # BLD AUTO: 0.71 X10(3) UL (ref 0.1–1)
MONOCYTES NFR BLD AUTO: 7.8 %
NEUTROPHILS # BLD AUTO: 5.64 X10 (3) UL (ref 1.5–7.7)
NEUTROPHILS # BLD AUTO: 5.64 X10(3) UL (ref 1.5–7.7)
NEUTROPHILS NFR BLD AUTO: 61.9 %
PLATELET # BLD AUTO: 188 10(3)UL (ref 150–450)
RBC # BLD AUTO: 3.34 X10(6)UL
RETICS # AUTO: 57.4 X10(3) UL (ref 22.5–147.5)
RETICS/RBC NFR AUTO: 1.7 %
TIBC SERPL-MCNC: 243 UG/DL (ref 250–425)
TRANSFERRIN SERPL-MCNC: 163 MG/DL (ref 215–365)
VIT B12 SERPL-MCNC: >2000 PG/ML (ref 211–911)
WBC # BLD AUTO: 9.1 X10(3) UL (ref 4–11)

## 2024-12-07 PROCEDURE — 84466 ASSAY OF TRANSFERRIN: CPT

## 2024-12-07 PROCEDURE — 83540 ASSAY OF IRON: CPT

## 2024-12-07 PROCEDURE — 83615 LACTATE (LD) (LDH) ENZYME: CPT

## 2024-12-07 PROCEDURE — 85045 AUTOMATED RETICULOCYTE COUNT: CPT

## 2024-12-07 PROCEDURE — 85025 COMPLETE CBC W/AUTO DIFF WBC: CPT

## 2024-12-07 PROCEDURE — 36415 COLL VENOUS BLD VENIPUNCTURE: CPT

## 2024-12-07 PROCEDURE — 82728 ASSAY OF FERRITIN: CPT

## 2024-12-07 PROCEDURE — 82607 VITAMIN B-12: CPT

## 2024-12-07 PROCEDURE — 82746 ASSAY OF FOLIC ACID SERUM: CPT

## 2024-12-07 RX ORDER — AMLODIPINE BESYLATE 10 MG/1
10 TABLET ORAL DAILY
Qty: 90 TABLET | Refills: 3 | Status: SHIPPED | OUTPATIENT
Start: 2024-12-07

## 2024-12-07 NOTE — TELEPHONE ENCOUNTER
Refill passed per [facility] protocol.     Requested Prescriptions   Pending Prescriptions Disp Refills    amLODIPine 10 MG Oral Tab 90 tablet 3     Sig: Take 1 tablet (10 mg total) by mouth daily.       Hypertension Medications Protocol Passed - 12/7/2024  9:35 AM        Passed - CMP or BMP in past 12 months        Passed - Last BP reading less than 140/90     BP Readings from Last 1 Encounters:   10/29/24 137/68               Passed - In person appointment or virtual visit in the past 12 mos or appointment in next 3 mos     Recent Outpatient Visits              1 month ago Other iron deficiency anemia    [facility]armila ALVAREN[facility] - Infusion    Office Visit    1 month ago Other iron deficiency anemia    [facility] Hematology Oncology Juanito Caruso MD    Office Visit    2 months ago Subacute cough    [facility] Sandip Pérez MD    Office Visit    4 months ago Oropharyngeal dysphagia    [facility] Sandip Pérez MD    Office Visit    6 months ago Type 2 diabetes mellitus without complication, without long-term current use of insulin (HCC)    [facility] Priyank Rodriguez MD    Office Visit          Future Appointments         Provider Department Appt Notes    In 6 days Juanito Caruso MD [facility] Hematology Oncology Follow-up 2m-LS    In 1 month Priyank Rodriguez MD [facility]     In 10 months Sandip Pérez MD [facility] yearly                    Passed - EGFRCR or GFRNAA > 50     GFR Evaluation  EGFRCR: 60 , resulted on 10/8/2024

## 2024-12-11 RX ORDER — CLOPIDOGREL BISULFATE 75 MG/1
75 TABLET ORAL DAILY
Qty: 90 TABLET | Refills: 1 | Status: SHIPPED | OUTPATIENT
Start: 2024-12-11

## 2024-12-11 NOTE — TELEPHONE ENCOUNTER
Patient is requesting a refill on his clopidogrel medication. Per the patient he is out of medication. Pharmacy: [facility] (listed)     Current Outpatient Medications   Medication Sig Dispense Refill    clopidogrel 75 MG Oral Tab Take 1 tablet (75 mg total) by mouth daily. 90 tablet 1

## 2024-12-11 NOTE — TELEPHONE ENCOUNTER
Please review.  Protocol failed / Has no protocol.    Marked High Priority, patient states out of medication     Requested Prescriptions   Pending Prescriptions Disp Refills    clopidogrel 75 MG Oral Tab 90 tablet 1     Sig: Take 1 tablet (75 mg total) by mouth daily.       There is no refill protocol information for this order        Future Appointments         Provider Department Appt Notes    In 2 days Juanito Caruso MD [facility] Hematology Oncology Follow-up 2m-LS    In 1 month Priyank Rodriguez MD [facility]     In 10 months Sandip Pérez MD [facility] yearly          Recent Outpatient Visits              1 month ago Other iron deficiency anemia    [facility] - Infusion    Office Visit    2 months ago Other iron deficiency anemia    [facility] Hematology Oncology Juanito Caruso MD    Office Visit    2 months ago Subacute cough    [facility] Sandip Pérez MD    Office Visit    4 months ago Oropharyngeal dysphagia    [facility] Sandip Pérez MD    Office Visit    6 months ago Type 2 diabetes mellitus without complication, without long-term current use of insulin (HCC)    [facility] Priyank Rodriguez MD    Office Visit

## 2024-12-13 ENCOUNTER — OFFICE VISIT (OUTPATIENT)
Dept: HEMATOLOGY/ONCOLOGY | Facility: HOSPITAL | Age: 89
End: 2024-12-13
Attending: INTERNAL MEDICINE
Payer: MEDICARE

## 2024-12-13 VITALS
WEIGHT: 109.81 LBS | SYSTOLIC BLOOD PRESSURE: 134 MMHG | RESPIRATION RATE: 18 BRPM | TEMPERATURE: 97 F | BODY MASS INDEX: 21.56 KG/M2 | HEART RATE: 62 BPM | HEIGHT: 60 IN | OXYGEN SATURATION: 95 % | DIASTOLIC BLOOD PRESSURE: 54 MMHG

## 2024-12-13 DIAGNOSIS — D50.8 OTHER IRON DEFICIENCY ANEMIA: Primary | ICD-10-CM

## 2024-12-13 DIAGNOSIS — N18.31 STAGE 3A CHRONIC KIDNEY DISEASE (HCC): ICD-10-CM

## 2024-12-13 PROCEDURE — 99214 OFFICE O/P EST MOD 30 MIN: CPT | Performed by: INTERNAL MEDICINE

## 2024-12-13 NOTE — PROGRESS NOTES
[facility] Hematology  Consultation Note    Patient Name: Mohamud Fitzpatrick   YOB: 1935   Medical Record Number: [id number]   [facility]: [id number]   Consulting Physician: Juanito Caruso MD  Referring Physician(s): Priyank Rodriguez MD   Date of Consultation: 10/11/2024     Reason for Consultation:    1. Other iron deficiency anemia      Current Therapy  S/p InFED 10/2024     INTERVAL HISTORY  Patient returns for follow-up. Since the last visit he was treated with INFeD reports some energy initially but has reported feeling more tired now.  Denies any bleeding though he does report his stools are black because he is taking oral ferrous sulfate.  He does have some diarrhea.  He does not want to undergo a colonoscopy.  Weight has improved a little.    Past Hematologic History  Mohamud Fitzpatrick is a 89 year old male seen today in the Hematology Clinic  for normocytic normochromic anemia.. CBC done on 10/8/2024 showed WBC 10.9 K, platelet count of 277 K and Hemoglobin and Hematocrit of 9.3/28.4.      Iron studies showed a decreased iron saturation of 11%.  Serum iron is 27.  Transferrin was low at 171.  He has been taking oral ferrous sulfate twice a day.  I reviewed his labs and he has had mild normocytic normochromic anemia dating back to 2020.  No prior history of peptic ulcer disease. He has a remote history of colorectal cancer status post partial colectomy.  Last colonoscopy was several years ago.    He had to have his teeth removed and has ill-fitting dentures that has limited his ability to eat much solid food.  He likes vegetables but tries to eat red meat occasionally.  Has lost some weight as a result.       Past Medical History:  Past Medical History:    Acute congestive heart failure, unspecified heart failure type (HCC)    Atherosclerosis of coronary artery    Congestive heart disease (HCC)    Coronary atherosclerosis    Diabetes (HCC)    Essential hypertension    Former smoker    High  blood pressure    History of quadruple bypass    Pressure ulcer       Past Surgical History:  Past Surgical History:   Procedure Laterality Date    Cabg  05/2009    Carotid endarterectomy      Colonoscopy      Other surgical history         Family Medical History:  Family History   Problem Relation Age of Onset    No Known Problems Father     Other (Other) Mother     Heart Disorder Brother        Gyne History:      Social History:  Social History     Socioeconomic History    Marital status:      Spouse name: Not on file    Number of children: Not on file    Years of education: Not on file    Highest education level: Not on file   Occupational History    Not on file   Tobacco Use    Smoking status: Former     Passive exposure: Past    Smokeless tobacco: Never   Vaping Use    Vaping status: Never Used   Substance and Sexual Activity    Alcohol use: Never    Drug use: Never    Sexual activity: Not Currently     Partners: Female   Other Topics Concern    Not on file   Social History Narrative    Not on file     Social Drivers of Health     Financial Resource Strain: Low Risk  (10/16/2024)    Financial Resource Strain     Difficulty of Paying Living Expenses: Not on file     Med Affordability: No   Food Insecurity: Not on file   Transportation Needs: No Transportation Needs (10/16/2024)    Transportation Needs     Lack of Transportation: No     Car Seat: Not on file   Physical Activity: Not on file   Stress: Not on file   Social Connections: Not on file   Housing Stability: Not on file       Allergies:   Allergies[1]    Current Medications:   clopidogrel 75 MG Oral Tab Take 1 tablet (75 mg total) by mouth daily. 90 tablet 1    amLODIPine 10 MG Oral Tab Take 1 tablet (10 mg total) by mouth daily. 90 tablet 3    glipiZIDE-metFORMIN HCl 2.5-500 MG Oral Tab Take 1 tablet by mouth 2 (two) times daily with meals. 180 tablet 3    ferrous sulfate 325 (65 FE) MG Oral Tab EC Take 1 tablet (325 mg total) by mouth 3 (three)  times a week. (Patient taking differently: Take 1 tablet (325 mg total) by mouth 2 (two) times daily.) 39 tablet 1    furosemide 20 MG Oral Tab Take 1 tablet (20 mg total) by mouth daily. 90 tablet 3    Glucose Blood (ACCU-CHEK SMARTVIEW) In Vitro Strip Use 1 (one) new strip twice daily for blood glucose monitoring 200 strip 3    TRUEplus Lancets 33G Does not apply Misc 1 Lancet by Finger stick route 2 (two) times daily. 200 each 3    metoprolol succinate  MG Oral Tablet 24 Hr Take 1.5 tablets (150 mg total) by mouth daily. 135 tablet 3    STIMULANT LAXATIVE 8.6-50 MG Oral Tab Take 1 tablet by mouth daily. 90 tablet 3    terazosin 2 MG Oral Cap Take 1 capsule (2 mg total) by mouth nightly. 90 capsule 3    losartan 50 MG Oral Tab Take 1 tablet (50 mg total) by mouth daily. 90 tablet 1    allopurinol 300 MG Oral Tab Take 1 tablet (300 mg total) by mouth daily. 90 tablet 3    Neomycin-Polymyxin-Dexameth 3.5-41413-6.1 Ophthalmic Suspension 2 (two) times daily.      triamcinolone 0.1 % External Cream Apply topically 2 (two) times daily as needed. 45 g 3    cyanocobalamine 1000 MCG Oral Tab Take 1 tablet (1,000 mcg total) by mouth daily. 30 tablet 0    atorvastatin 40 MG Oral Tab Take 1 tablet (40 mg total) by mouth nightly.      Glucose Blood In Vitro Strip Check glucose once daily      Dorzolamide HCl-Timolol Mal 22.3-6.8 MG/ML Ophthalmic Solution Place 1 drop into both eyes 2 (two) times daily.      LUMIGAN 0.01 % Ophthalmic Solution Place 1 drop into both eyes every evening.         Review of Systems:  Pertinent positives and negatives noted in the the HPI.     Physical Examination:  /54 (BP Location: Left arm, Patient Position: Sitting, Cuff Size: adult)   Pulse 62   Temp 97.4 °F (36.3 °C) (Oral)   Resp 18   Ht 1.473 m (4' 10\")   Wt 49.8 kg (109 lb 12.8 oz)   SpO2 95%   BMI 22.95 kg/m²     General: Patient is alert and oriented x 3, not in acute distress.  cachectic  HEENT: EOMs intact. PERRL.  Oropharynx is clear.   Neck: No JVD. No palpable lymphadenopathy. Neck is supple.  Lymphatics: There is no palpable lymphadenopathy throughout in the cervical, supraclavicular, axillary, or inguinal regions.  Chest: Clear to auscultation. No wheezes or rales.  Heart: Regular rate and rhythm. S1S2 normal.  Extremities: No edema or calf tenderness.   Neurological: Grossly intact. Uses a walker to ambulate    Labs:    Lab Results   Component Value Date/Time    WBC 9.1 12/07/2024 07:44 AM    RBC 3.34 (L) 12/07/2024 07:44 AM    HGB 8.9 (L) 12/07/2024 07:44 AM    HCT 28.3 (L) 12/07/2024 07:44 AM    MCV 84.7 12/07/2024 07:44 AM    MCH 26.6 12/07/2024 07:44 AM    MCHC 31.4 12/07/2024 07:44 AM    RDW 15.4 (H) 12/07/2024 07:44 AM    NEPRELIM 5.64 12/07/2024 07:44 AM    .0 12/07/2024 07:44 AM       Impression:  Diagnosis  1. Other iron deficiency anemia      Plan:  I suspect his anemia is likely secondary to mild iron deficiency, chronic kidney disease as well as anemia of chronic inflammation due to underlying diabetes and other comorbidities.    Little response to InFED with low Fe sat, suggest starting Feraheme x 2 soon  FOBT if positive, rec EGD/C-scope  LDH/retic normal suggesting against hemolysis  Repeat CBC/Fe studies in 3 mos, if anemia not improving, may need a bone marrow bx to exclude MDS  Eosinophilia: improving    Thank you Dr Priyank Rodriguez MD for the opportunity to participate in the care of this interesting patient. Please do contact me if I may be of any further assistance    Juanito Caruso MD  [facility] Hematology/Oncology           [1] No Known Allergies

## 2024-12-19 ENCOUNTER — NURSE TRIAGE (OUTPATIENT)
Dept: INTERNAL MEDICINE CLINIC | Facility: CLINIC | Age: 89
End: 2024-12-19

## 2024-12-19 NOTE — TELEPHONE ENCOUNTER
Action Requested: Summary for Provider     []  Critical Lab, Recommendations Needed  [x] Need Additional Advice  []   FYI    []   Need Orders  [] Need Medications Sent to Pharmacy  []  Other     SUMMARY: Disposition per protocol  is to be seen in office today-Daughter declines.    Increased Bilateral leg swelling from the knees down started Sunday or Monday, possibly related  to eating some salty food on Sunday. His legs are uncomfortable but not painful. Denies other symptoms.  Dr Rodriguez is out of office today. Message also sent to pod mate Bhavna Dominguez.    Dr Rodriguez/Bhavna Dominguez: Daughter asks if he can take an extra  dose Furosemide 20 mg for a few days-this has relieved prior episodes of edema?   Current order is Furosemide 20mg daily.    Reason for call: Leg Swelling  Onset: 5 days     Patient's daughter Samina calling, verified patient name and date of birth. She has release of information permission.  Calling to ask if patient can take extra doses of furosemide for increased leg swelling. Reviewed care advice, call back for increased swelling or new symptoms. Patient verbalizes understanding and agrees to plan of care.    Reason for Disposition   MODERATE swelling of both ankles (e.g., swelling extends up to the knees) AND new-onset or worsening    Protocols used: Leg Swelling and Edema-A-OH

## 2024-12-19 NOTE — TELEPHONE ENCOUNTER
Okay to advise patient to increase furosemide from 20 mg a day up to 40 mg the next 3 days.  Minimize salt intake.  Keep legs elevated.  Let us know whether things are better.

## 2024-12-19 NOTE — TELEPHONE ENCOUNTER
Name and  verified.     Daughter called back asking for updated. Reviewed Dr. Perkins advice with her. She verbalized understanding and agreed with plan. Informed her to monitor blood pressure of the patient as furosemide can lower blood pressure.

## 2024-12-23 RX ORDER — ALLOPURINOL 300 MG/1
300 TABLET ORAL DAILY
Qty: 90 TABLET | Refills: 1 | Status: SHIPPED | OUTPATIENT
Start: 2024-12-23

## 2024-12-23 NOTE — TELEPHONE ENCOUNTER
Please review; protocol failed/ has no protocol    Requested Prescriptions   Pending Prescriptions Disp Refills    ALLOPURINOL 300 MG Oral Tab [Pharmacy Med Name: ALLOPURINOL 300 MG TABLET] 90 tablet 3     Sig: TAKE 1 TABLET BY MOUTH EVERY DAY       There is no refill protocol information for this order        Recent Outpatient Visits              1 week ago Other iron deficiency anemia    [facility] Hematology Oncology Juanito Caruso MD    Office Visit    1 month ago Other iron deficiency anemia    [facility] - Infusion    Office Visit    2 months ago Other iron deficiency anemia    [facility] Hematology Oncology Juanito Caruso MD    Office Visit    2 months ago Subacute cough    [facility] Sandip Pérez MD    Office Visit    4 months ago Oropharyngeal dysphagia    [facility] Sandip Pérez MD    Office Visit          Future Appointments         Provider Department Appt Notes    In 3 weeks Priyank Rodriguez MD [facility]     In 2 months Juanito Caruso MD [facility] Hematology Oncology [place] labs outpt    In 9 months Sandip Pérez MD [facility] yearly

## 2025-01-03 ENCOUNTER — TELEPHONE (OUTPATIENT)
Dept: INTERNAL MEDICINE CLINIC | Facility: CLINIC | Age: 89
End: 2025-01-03

## 2025-01-03 NOTE — TELEPHONE ENCOUNTER
Name and  verified.     Patients daughter calling with a condition update from  triage encounter.     His swelling in his legs got a little better with the increased furosemide but would get worse as the day went on. Now His legs now are not swollen in the morning and they get worse through out the day, he sits on the couch with his legs down some times and sometimes elevated. Patient also has been having iron infusions, they are wondering if the swelling could be from the infusions? Patient states His legs feel heavy and he is tiring faster. The patient has an appointment on 01/15 with you.     Patient also states he fell out of his kitchen chair and he hit his back on the seat of the chair and is having some pain when he coughs. No bruising, did not hit his head.

## 2025-01-03 NOTE — TELEPHONE ENCOUNTER
Advised daughter Samina (on release) of the note below. She advised he mentioned he has swelling to his testicles. His daughter has not evaluated him. He is not having pain. Advised since he's not having pain to keep the appointment on the 15th and call back as needed.

## 2025-01-03 NOTE — TELEPHONE ENCOUNTER
Swelling in the legs would be a pretty rare complication of iron infusion.  I would continue with the iron infusion at this point.  Have they tried knee-high medium strength compression stockings?  This may also help during the day to keep the fluid from accumulating in his lower legs.  He needs to try to keep his legs elevated.  Keep keep the appointment on 1/15.  May be able to squeeze him in sooner if necessary.

## 2025-01-06 NOTE — TELEPHONE ENCOUNTER
Name and  verified. Patients daughter calling back stating the patients swelling is better on his legs and he would like to cancel the appointment for today and keep the appointment he has for next week. Appointment cancelled.

## 2025-01-06 NOTE — TELEPHONE ENCOUNTER
Name and  verified. Patients daughter calling back stating the patients upper thigh area is swollen now as well. The patient did not state he was in any pain. Scheduled appointment for 4:40 pm today. Patients daughter will curt back to reschedule if the patient cannot make that appointment.

## 2025-01-09 ENCOUNTER — OFFICE VISIT (OUTPATIENT)
Age: 89
End: 2025-01-09
Attending: INTERNAL MEDICINE
Payer: MEDICARE

## 2025-01-09 VITALS
OXYGEN SATURATION: 96 % | SYSTOLIC BLOOD PRESSURE: 135 MMHG | RESPIRATION RATE: 16 BRPM | TEMPERATURE: 98 F | DIASTOLIC BLOOD PRESSURE: 58 MMHG | HEART RATE: 58 BPM | WEIGHT: 114 LBS | BODY MASS INDEX: 24 KG/M2

## 2025-01-09 DIAGNOSIS — D50.8 OTHER IRON DEFICIENCY ANEMIA: Primary | ICD-10-CM

## 2025-01-09 NOTE — PROGRESS NOTES
Patient here for  injectafer infusion,     arrives ambulating with cane accompanied by family member. Patient reports feeling fatigue he had Iron dextran previously and tolerated well     Ordering Provider: [facility]  Order Exp: after this dose      Iron given, tolerated well, PIV removed, dsicharged stable with family member , AVS provided     Education Record  Learner:  Patient and Family Member  Disease / Diagnosis: ROCCO  Barriers / Limitations:  None  Method:  Reinforcement  General Topics:  Medication and Plan of care reviewed  Outcome:  Shows understanding

## 2025-01-12 ENCOUNTER — HOSPITAL ENCOUNTER (INPATIENT)
Facility: HOSPITAL | Age: 89
LOS: 4 days | Discharge: HOME | End: 2025-01-16
Attending: EMERGENCY MEDICINE | Admitting: INTERNAL MEDICINE
Payer: MEDICARE

## 2025-01-12 ENCOUNTER — APPOINTMENT (OUTPATIENT)
Dept: GENERAL RADIOLOGY | Facility: HOSPITAL | Age: 89
End: 2025-01-12
Attending: EMERGENCY MEDICINE
Payer: MEDICARE

## 2025-01-12 DIAGNOSIS — I50.9 CONGESTIVE HEART FAILURE, UNSPECIFIED HF CHRONICITY, UNSPECIFIED HEART FAILURE TYPE (HCC): Primary | ICD-10-CM

## 2025-01-12 DIAGNOSIS — G11.9 PRIMARY CEREBELLAR DEGENERATION (HCC): ICD-10-CM

## 2025-01-12 LAB
ANION GAP SERPL CALC-SCNC: 8 MMOL/L (ref 0–18)
BASOPHILS # BLD AUTO: 0.05 X10(3) UL (ref 0–0.2)
BASOPHILS NFR BLD AUTO: 0.7 %
BUN BLD-MCNC: 28 MG/DL (ref 9–23)
BUN/CREAT SERPL: 25.9 (ref 10–20)
CALCIUM BLD-MCNC: 9.5 MG/DL (ref 8.7–10.4)
CHLORIDE SERPL-SCNC: 111 MMOL/L (ref 98–112)
CO2 SERPL-SCNC: 22 MMOL/L (ref 21–32)
CREAT BLD-MCNC: 1.08 MG/DL
DEPRECATED RDW RBC AUTO: 46.7 FL (ref 35.1–46.3)
EGFRCR SERPLBLD CKD-EPI 2021: 66 ML/MIN/1.73M2 (ref 60–?)
EOSINOPHIL # BLD AUTO: 1.08 X10(3) UL (ref 0–0.7)
EOSINOPHIL NFR BLD AUTO: 15.5 %
ERYTHROCYTE [DISTWIDTH] IN BLOOD BY AUTOMATED COUNT: 15 % (ref 11–15)
EST. AVERAGE GLUCOSE BLD GHB EST-MCNC: 131 MG/DL (ref 68–126)
GLUCOSE BLD-MCNC: 192 MG/DL (ref 70–99)
GLUCOSE BLDC GLUCOMTR-MCNC: 147 MG/DL (ref 70–99)
GLUCOSE BLDC GLUCOMTR-MCNC: 194 MG/DL (ref 70–99)
HBA1C MFR BLD: 6.2 % (ref ?–5.7)
HCT VFR BLD AUTO: 27.4 %
HGB BLD-MCNC: 8.5 G/DL
IMM GRANULOCYTES # BLD AUTO: 0.03 X10(3) UL (ref 0–1)
IMM GRANULOCYTES NFR BLD: 0.4 %
LYMPHOCYTES # BLD AUTO: 0.74 X10(3) UL (ref 1–4)
LYMPHOCYTES NFR BLD AUTO: 10.6 %
MAGNESIUM SERPL-MCNC: 1.6 MG/DL (ref 1.6–2.6)
MCH RBC QN AUTO: 26.3 PG (ref 26–34)
MCHC RBC AUTO-ENTMCNC: 31 G/DL (ref 31–37)
MCV RBC AUTO: 84.8 FL
MONOCYTES # BLD AUTO: 0.52 X10(3) UL (ref 0.1–1)
MONOCYTES NFR BLD AUTO: 7.4 %
NEUTROPHILS # BLD AUTO: 4.56 X10 (3) UL (ref 1.5–7.7)
NEUTROPHILS # BLD AUTO: 4.56 X10(3) UL (ref 1.5–7.7)
NEUTROPHILS NFR BLD AUTO: 65.4 %
NT-PROBNP SERPL-MCNC: ABNORMAL PG/ML (ref ?–450)
OSMOLALITY SERPL CALC.SUM OF ELEC: 303 MOSM/KG (ref 275–295)
PLATELET # BLD AUTO: 204 10(3)UL (ref 150–450)
POTASSIUM SERPL-SCNC: 4.5 MMOL/L (ref 3.5–5.1)
RBC # BLD AUTO: 3.23 X10(6)UL
SODIUM SERPL-SCNC: 141 MMOL/L (ref 136–145)
TROPONIN I SERPL HS-MCNC: 32 NG/L
WBC # BLD AUTO: 7 X10(3) UL (ref 4–11)

## 2025-01-12 PROCEDURE — 71045 X-RAY EXAM CHEST 1 VIEW: CPT | Performed by: EMERGENCY MEDICINE

## 2025-01-12 PROCEDURE — 99223 1ST HOSP IP/OBS HIGH 75: CPT | Performed by: INTERNAL MEDICINE

## 2025-01-12 RX ORDER — SODIUM PHOSPHATE, DIBASIC AND SODIUM PHOSPHATE, MONOBASIC 7; 19 G/230ML; G/230ML
1 ENEMA RECTAL ONCE AS NEEDED
Status: DISCONTINUED | OUTPATIENT
Start: 2025-01-12 | End: 2025-01-16

## 2025-01-12 RX ORDER — POLYETHYLENE GLYCOL 3350 17 G/17G
17 POWDER, FOR SOLUTION ORAL DAILY PRN
Status: DISCONTINUED | OUTPATIENT
Start: 2025-01-12 | End: 2025-01-16

## 2025-01-12 RX ORDER — SENNOSIDES 8.6 MG
17.2 TABLET ORAL NIGHTLY PRN
Status: DISCONTINUED | OUTPATIENT
Start: 2025-01-12 | End: 2025-01-16

## 2025-01-12 RX ORDER — FUROSEMIDE 10 MG/ML
20 INJECTION INTRAMUSCULAR; INTRAVENOUS
Status: DISCONTINUED | OUTPATIENT
Start: 2025-01-13 | End: 2025-01-13

## 2025-01-12 RX ORDER — HEPARIN SODIUM 5000 [USP'U]/ML
5000 INJECTION, SOLUTION INTRAVENOUS; SUBCUTANEOUS EVERY 8 HOURS SCHEDULED
Status: DISCONTINUED | OUTPATIENT
Start: 2025-01-12 | End: 2025-01-16

## 2025-01-12 RX ORDER — NICOTINE POLACRILEX 4 MG
30 LOZENGE BUCCAL
Status: DISCONTINUED | OUTPATIENT
Start: 2025-01-12 | End: 2025-01-16

## 2025-01-12 RX ORDER — ATORVASTATIN CALCIUM 40 MG/1
40 TABLET, FILM COATED ORAL NIGHTLY
Status: DISCONTINUED | OUTPATIENT
Start: 2025-01-12 | End: 2025-01-16

## 2025-01-12 RX ORDER — FUROSEMIDE 10 MG/ML
40 INJECTION INTRAMUSCULAR; INTRAVENOUS ONCE
Status: COMPLETED | OUTPATIENT
Start: 2025-01-12 | End: 2025-01-12

## 2025-01-12 RX ORDER — NICOTINE POLACRILEX 4 MG
15 LOZENGE BUCCAL
Status: DISCONTINUED | OUTPATIENT
Start: 2025-01-12 | End: 2025-01-16

## 2025-01-12 RX ORDER — ACETAMINOPHEN 500 MG
500 TABLET ORAL EVERY 4 HOURS PRN
Status: DISCONTINUED | OUTPATIENT
Start: 2025-01-12 | End: 2025-01-16

## 2025-01-12 RX ORDER — BENZONATATE 100 MG/1
200 CAPSULE ORAL 3 TIMES DAILY PRN
Status: DISCONTINUED | OUTPATIENT
Start: 2025-01-12 | End: 2025-01-16

## 2025-01-12 RX ORDER — METOCLOPRAMIDE HYDROCHLORIDE 5 MG/ML
5 INJECTION INTRAMUSCULAR; INTRAVENOUS EVERY 8 HOURS PRN
Status: DISCONTINUED | OUTPATIENT
Start: 2025-01-12 | End: 2025-01-16

## 2025-01-12 RX ORDER — ONDANSETRON 2 MG/ML
4 INJECTION INTRAMUSCULAR; INTRAVENOUS EVERY 6 HOURS PRN
Status: DISCONTINUED | OUTPATIENT
Start: 2025-01-12 | End: 2025-01-16

## 2025-01-12 RX ORDER — ALLOPURINOL 300 MG/1
300 TABLET ORAL DAILY
Status: DISCONTINUED | OUTPATIENT
Start: 2025-01-13 | End: 2025-01-16

## 2025-01-12 RX ORDER — DEXTROSE MONOHYDRATE 25 G/50ML
50 INJECTION, SOLUTION INTRAVENOUS
Status: DISCONTINUED | OUTPATIENT
Start: 2025-01-12 | End: 2025-01-16

## 2025-01-12 RX ORDER — BISACODYL 10 MG
10 SUPPOSITORY, RECTAL RECTAL
Status: DISCONTINUED | OUTPATIENT
Start: 2025-01-12 | End: 2025-01-16

## 2025-01-12 RX ORDER — MAGNESIUM OXIDE 400 MG/1
400 TABLET ORAL ONCE
Status: COMPLETED | OUTPATIENT
Start: 2025-01-12 | End: 2025-01-12

## 2025-01-12 RX ORDER — CLOPIDOGREL BISULFATE 75 MG/1
75 TABLET ORAL DAILY
Status: DISCONTINUED | OUTPATIENT
Start: 2025-01-13 | End: 2025-01-16

## 2025-01-12 NOTE — H&P
[facility]  part of [facility]    History and Physical     Mohamud Fitzpatrick Patient Status:  Inpatient    1935 MRN [id number]   Location [facility] 3W/SW Attending Amelia Ruggiero, DO   Hosp Day # 0 PCP Priyank Rodriguez MD     History of Present Illness: Mohamud Fitzpatrick is a 89 year old male with a past medical history of CAD s/p CABG, HFpEF, HTN, T2DM presented to the ER with complaints of worsening leg swelling and scrotal swelling for the past few days.   He denied any chest pain, n/v/d or any other complaints. He deneid any recent travel or sick contacts.   He has been having a lot of panera soups - family stated they were unaware of the salt content.  Family present at the bedside and translating.     Past Medical History:  Past Medical History:    Acute congestive heart failure, unspecified heart failure type (HCC)    Atherosclerosis of coronary artery    Congestive heart disease (HCC)    Coronary atherosclerosis    Diabetes (HCC)    Essential hypertension    Former smoker    High blood pressure    History of quadruple bypass    Pressure ulcer        Past Surgical History:   Past Surgical History:   Procedure Laterality Date    Cabg  2009    Carotid endarterectomy      Colonoscopy      Other surgical history         Social History:  reports that he has quit smoking. He has been exposed to tobacco smoke. He has never used smokeless tobacco. He reports that he does not drink alcohol and does not use drugs.    Family History:   Family History   Problem Relation Age of Onset    No Known Problems Father     Other (Other) Mother     Heart Disorder Brother        Allergies: Allergies[1]    Medications:  Medications Ordered Prior to Encounter[2]    Review of Systems:   A comprehensive 14 point review of systems was completed.    Pertinent positives and negatives noted in the HPI.    Physical Exam:    /86 (BP Location: Right arm)   Pulse 64   Temp 97.9 °F  (36.6 °C) (Oral)   Resp 20   Wt 113 lb 3.2 oz (51.3 kg)   SpO2 95%   BMI 23.66 kg/m²   General: No acute distress. Alert and oriented x 3.  Respiratory: Clear to auscultation bilaterally. No wheezes. No rhonchi.  Cardiovascular: S1, S2. Regular rate and rhythm. No murmurs, no rubs or gallops. Equal pulses.   Abdomen: Soft, nontender, nondistended.  Positive bowel sounds. No rebound, guarding or organomegaly.  Neurologic: No focal neurological deficits. CNII-XII grossly intact.  Extremities: 2+ edema in bilateral LE.       Diagnostic Data:      Labs:  Recent Labs   Lab 01/12/25  1246   WBC 7.0   HGB 8.5*   MCV 84.8   .0       Recent Labs   Lab 01/12/25  1246   *   BUN 28*   CREATSERUM 1.08   CA 9.5      K 4.5      CO2 22.0       Estimated Creatinine Clearance: 35 mL/min (based on SCr of 1.08 mg/dL).    No results for input(s): \"PTP\", \"INR\" in the last 168 hours.    No results for input(s): \"TROP\", \"CK\" in the last 168 hours.    Imaging: Imaging data reviewed in Epic.      ASSESSMENT / PLAN:     HFpEF, acute on chronic  Hx of CAD s/p CABG  CXR reviewed  BNP elevated  Trop and EKG pending  Started on Lasix IV BID  Cardiology on consult  Appreciate recs  T2DM  SSI and frequent accuchecks  HTN  BP well controlled  Continue home meds as indicated  Monitor vitals  HLD  Continue statin      Quality:  DVT Prophylaxis: Heparin s/c  CODE status: Full    Plan of care discussed with ED physician    Sariah Solomon MD  1/12/2025                         The 21st Century Cures Act makes medical notes like these available to patients in the interest of transparency. Please be advised this is a medical document. Medical documents are intended to carry relevant information, facts as evident, and the clinical opinion of the practitioner. The medical note is intended as peer to peer communication and may appear blunt or direct. It is written in medical language and may contain abbreviations or verbiage that  are unfamiliar.        [1] No Known Allergies  [2]   Current Facility-Administered Medications on File Prior to Encounter   Medication Dose Route Frequency Provider Last Rate Last Admin    [COMPLETED] ferumoxytol (Feraheme) 510 mg in sodium chloride 0.9% 117 mL IVPB  510 mg Intravenous Once Juanito Caruso MD   Stopped at 25 1528    [COMPLETED] iron dextran (Infed) 25 mg in sodium chloride 0.9% PF 10 mL IV syringe (test dose)  25 mg Intravenous Once Juanito Caruso MD   25 mg at 10/29/24 1459    [COMPLETED] iron dextran (Infed) 975 mg in sodium chloride 0.9% 269.5 mL IVPB  975 mg Intravenous Once Juanito Caruso MD   Stopped at 10/29/24 1700     Current Outpatient Medications on File Prior to Encounter   Medication Sig Dispense Refill    allopurinol 300 MG Oral Tab Take 1 tablet (300 mg total) by mouth daily. 90 tablet 1    clopidogrel 75 MG Oral Tab Take 1 tablet (75 mg total) by mouth daily. 90 tablet 1    amLODIPine 10 MG Oral Tab Take 1 tablet (10 mg total) by mouth daily. 90 tablet 3    glipiZIDE-metFORMIN HCl 2.5-500 MG Oral Tab Take 1 tablet by mouth 2 (two) times daily with meals. 180 tablet 3    [] traMADol 50 MG Oral Tab Take 1-2 tablets ( mg total) by mouth every 8 (eight) hours as needed for Pain. 10 tablet 0    ferrous sulfate 325 (65 FE) MG Oral Tab EC Take 1 tablet (325 mg total) by mouth 3 (three) times a week. (Patient taking differently: Take 1 tablet (325 mg total) by mouth 2 (two) times daily.) 39 tablet 1    furosemide 20 MG Oral Tab Take 1 tablet (20 mg total) by mouth daily. 90 tablet 3    Glucose Blood (ACCU-CHEK SMARTVIEW) In Vitro Strip Use 1 (one) new strip twice daily for blood glucose monitoring 200 strip 3    TRUEplus Lancets 33G Does not apply Misc 1 Lancet by Finger stick route 2 (two) times daily. 200 each 3    metoprolol succinate  MG Oral Tablet 24 Hr Take 1.5 tablets (150 mg total) by mouth daily. 135 tablet 3    STIMULANT LAXATIVE 8.6-50 MG Oral Tab Take 1 tablet by  mouth daily. 90 tablet 3    terazosin 2 MG Oral Cap Take 1 capsule (2 mg total) by mouth nightly. 90 capsule 3    losartan 50 MG Oral Tab Take 1 tablet (50 mg total) by mouth daily. 90 tablet 1    Neomycin-Polymyxin-Dexameth 3.5-56570-2.1 Ophthalmic Suspension 2 (two) times daily.      triamcinolone 0.1 % External Cream Apply topically 2 (two) times daily as needed. 45 g 3    cyanocobalamine 1000 MCG Oral Tab Take 1 tablet (1,000 mcg total) by mouth daily. 30 tablet 0    atorvastatin 40 MG Oral Tab Take 1 tablet (40 mg total) by mouth nightly.      Glucose Blood In Vitro Strip Check glucose once daily      Dorzolamide HCl-Timolol Mal 22.3-6.8 MG/ML Ophthalmic Solution Place 1 drop into both eyes 2 (two) times daily.      LUMIGAN 0.01 % Ophthalmic Solution Place 1 drop into both eyes every evening.

## 2025-01-12 NOTE — ED QUICK NOTES
Orders for admission, patient is aware of plan and ready to go upstairs. Any questions, please call ED RN Lisa BELTRÁN at extension [phone].     Patient Covid vaccination status: Fully vaccinated     COVID Test Ordered in ED: None    COVID Suspicion at Admission: N/A    Running Infusions:  None    Mental Status/LOC at time of transport: A&Ox4    Other pertinent information: Ambulatory with [facility]  CIWA score: N/A   NIH score:  N/A

## 2025-01-12 NOTE — ED INITIAL ASSESSMENT (HPI)
Pt to ED with daughter for c/o testicular swelling, bilateral upper thigh swelling with redness. Less urine o/p today, pt takes furosemide. Hx of anemia and gets blood transfusion. No fevers, increased weakness

## 2025-01-12 NOTE — ED PROVIDER NOTES
Patient Seen in: [facility] Emergency Department    History     Chief Complaint   Patient presents with    Eval-G     Stated Complaint: Leg Pain    HPI    Patient has  history of CHF  complains of progressive swelling in legs now into scrotum.  no cough.  no chest pain.   no fever or chills.  Sig leg  swelling.  + orthopnea.   .  Symptoms worse with activity.    Symptoms improved with nothing.     Past Medical History:    Acute congestive heart failure, unspecified heart failure type (HCC)    Atherosclerosis of coronary artery    Congestive heart disease (HCC)    Coronary atherosclerosis    Diabetes (HCC)    Essential hypertension    Former smoker    High blood pressure    History of quadruple bypass    Pressure ulcer       Past Surgical History:   Procedure Laterality Date    Cabg  05/2009    Carotid endarterectomy      Colonoscopy      Other surgical history              Family History   Problem Relation Age of Onset    No Known Problems Father     Other (Other) Mother     Heart Disorder Brother        Social History     Socioeconomic History    Marital status:    Tobacco Use    Smoking status: Former     Passive exposure: Past    Smokeless tobacco: Never   Vaping Use    Vaping status: Never Used   Substance and Sexual Activity    Alcohol use: Never    Drug use: Never    Sexual activity: Not Currently     Partners: Female     Social Drivers of Health     Financial Resource Strain: Low Risk  (10/16/2024)    Financial Resource Strain     Med Affordability: No   Transportation Needs: No Transportation Needs (10/16/2024)    Transportation Needs     Lack of Transportation: No       Review of Systems    Positive for stated complaint: Leg Pain  Other systems are as noted in HPI.  Constitutional and vital signs reviewed.      All other systems reviewed and negative except as noted above.    PSFH elements reviewed from today and agreed except as otherwise stated in HPI.    Physical Exam     ED Triage Vitals  [01/12/25 1158]   BP 98/62   Pulse (!) 43   Resp 18   Temp 97.9 °F (36.6 °C)   Temp src Oral   SpO2 96 %   O2 Device None (Room air)       Current:BP 98/62   Pulse (!) 43   Temp 97.9 °F (36.6 °C) (Oral)   Resp 18   SpO2 96%   PULSE OX nl  GENERAL: awake alert  HEAD: normocephalic, atraumatic,   EYES: PERRLA, EOMI,  THROAT: mm dry, no lesions  NECK: supple, no meningeal signs  LUNGS:dec at bases  CARDIO: irregular  GI: abdomen is soft and non tender, no masses, nl bowel sounds   EXTREMITIES: from, 5/5 strength in all 4 ext,2+ edema through thighs into scrotum  NEURO: alert and oiented *3, 2-12 intact, no focal deficit noted  SKIN: good skin turgor, no  rashes  PSYCH: calm, cooperative,    Differential includes: pneumonia vs. CHF vs. bronchitis vs. PE      ED Course     Labs Reviewed   BASIC METABOLIC PANEL (8) - Abnormal; Notable for the following components:       Result Value    Glucose 192 (*)     BUN 28 (*)     BUN/CREA Ratio 25.9 (*)     Calculated Osmolality 303 (*)     All other components within normal limits   CBC WITH DIFFERENTIAL WITH PLATELET - Abnormal; Notable for the following components:    RBC 3.23 (*)     HGB 8.5 (*)     HCT 27.4 (*)     RDW-SD 46.7 (*)     Lymphocyte Absolute 0.74 (*)     Eosinophil Absolute 1.08 (*)     All other components within normal limits   PRO BETA NATRIURETIC PEPTIDE - Abnormal; Notable for the following components:    Pro-Beta Natriuretic Peptide 15,464 (*)     All other components within normal limits   MAGNESIUM - Normal   SCAN SLIDE   RAINBOW DRAW LAVENDER   RAINBOW DRAW LIGHT GREEN   RAINBOW DRAW BLUE   RAINBOW DRAW GOLD     EKG    Rate, intervals and axes as noted on EKG Report.  Rate: 67  Rhythm: Sinus Rhythm  Reading: sr with first degreee av block with lvh bifasicular block           MDM     Monitor Interpretation:  Nsr 78    Radiology Interpretation:  XR CHEST AP PORTABLE  (CPT=71045)    Result Date: 1/12/2025  CONCLUSION:   Mild cardiomegaly with  interstitial and alveolar pulmonary edema.  Other superimposed alveolar process not excluded in the appropriate clinical setting.  3.2 cm nodular opacity in the right perihilar region, favored to relate to vascular congestion in this setting.  Recommend radiographic follow-up to assess resolution and exclude underlying pulmonary mass.        Dictated by (CST): Kristal Rose MD on 1/12/2025 at 3:45 PM     Finalized by (CST): Kristal Rose MD on 1/12/2025 at 3:47 PM             I reviewed xray noted pulm edema      Medical Decision Making  Problems Addressed:  Congestive heart failure, unspecified HF chronicity, unspecified heart failure type (HCC): acute illness or injury     Details: Iv lasix admit consulted cardiology    Amount and/or Complexity of Data Reviewed  Labs: ordered. Decision-making details documented in ED Course.  Radiology: ordered and independent interpretation performed. Decision-making details documented in ED Course.  ECG/medicine tests: ordered and independent interpretation performed. Decision-making details documented in ED Course.  Discussion of management or test interpretation with external provider(s): Spoke with hospitalist Dr. Solomon and consulted cardiology    Risk  Decision regarding hospitalization.          Disposition and Plan     Clinical Impression:  1. Congestive heart failure, unspecified HF chronicity, unspecified heart failure type (HCC)        Disposition:  Admit    Follow-up:  No follow-up provider specified.    Medications Prescribed:  Current Discharge Medication List          Hospital Problems       Present on Admission  Date Reviewed: 12/13/2024   None

## 2025-01-12 NOTE — HISTORICAL OFFICE NOTE
[facility]  Outside Information  Continuity of Care Document  8/20/2024  Mohamud Fitzpatrick - 89 y.o. Male; born [birth year]Summary of episode note, generated on Aug. 30, 2024August 30, 2024   Additional Documents     ClinicalDocument.xml - Referral Note (Last Received: 8/20/2024  4:59 PM)   Plain Text Document (Last Received: 8/20/2024  4:59 PM)   Continuity of Care Document (Last Received: 8/30/2024 11:01 AM) - Currently Viewing  CHIEF COMPLAINT    CHIEF COMPLAINT  Reason for Visit/Chief Complaint   F/u   This a pleasant 89-year-old with coronary disease known bypass in 2009 carotid endarterectomy hypertension and elevated cholesterol presents for follow-up. Since last seen he had pneumonia and bronchitis which is now improved. He is scheduled for a swallow eval because of trouble swallowing after prior carotid surgery. He is poorly functional class I. He has no fluid retention. He had a recent PET stress test showing normal perfusion. Resting EF is 35% with exercise EF 55% his echo in January shows his LV is normal with an aortic range of 10 and mild MR. His LDL 40 in June     PROBLEMS  Reconcile with Patient's ChartPROBLEMS  Problem Effective Dates Date resolved Problem Status   Acute heart failure with normal ejection fraction, [SNOMED-CT: 837268475] 10/8/2021 - Active   History of CABG (coronary artery bypass graft) - Hx, [SNOMED-CT: 067477853] 10/8/2021 - Active   Anemia, unspecified, [SNOMED-CT: 197455854] 10/8/2021 - Active   Atherosclerosis of native coronary artery of native heart without angina pectoris, [SNOMED-CT: 421212766] 2/10/2020 - Active   Other hyperlipidemia, [SNOMED-CT: 66838127] 2/10/2020 - Active   Murmur, [SNOMED-CT: 29580762] 2/10/2020 - Active   Carotid artery stenosis, bilateral, [SNOMED-CT: 662018148] 2/10/2020 - Active   Other hyperlipidemia, [SNOMED-CT: 23494669] 8/23/2021 - Active     ENCOUNTER    ENCOUNTER  Problem Effective Dates Date resolved  Problem Status   Hypertension (HTN), primary, [SNOMED-CT: 04212177] 1/15/2024 - Active   Acute heart failure with normal ejection fraction, [SNOMED-CT: 839668880] 10/8/2021 - Active   Atherosclerosis of native coronary artery of native heart without angina pectoris, [SNOMED-CT: 696039925] 2/10/2020 - Active   Other hyperlipidemia, [SNOMED-CT: 63605513] 2/10/2020 - Active   Other hyperlipidemia, [SNOMED-CT: 17550510] 8/23/2021 - Active     VITAL SIGNS    VITAL SIGNS  Date / Time: 8/20/2024   BP Systolic 134 mmHg   BP Diastolic 68 mmHg   Height 57 inches   Weight 101 lbs   Pulse Rate 71 bpm   BSA (Body Surface Area) 1.4 cc/m2   BMI (Body Mass Index) 21.9 cc/m2   Blood Pressure 134 / 68 mmHg     PHYSICAL EXAMINATION    PHYSICAL EXAMINATION  Header Details   Constitutional 99%o2   Vitals Left Arm Sitting  / 68 mmHg, Pulse rate 71 bpm, Height in 4' 9\", BMI: 21.9, Weight in 101.41 lbs (or) 46 kgs, BSA : 1.36 cc/m²   General Appearance No Acute Distress   Cardiovascular      ALLERGIES, ADVERSE REACTIONS, ALERTS    No data available    MEDICATIONS ADMINISTERED DURING VISIT    No data available    MEDICATIONS  Reconcile with Patient's ChartMEDICATIONS  Medication Start Date Route/Frequency Status   allopurinol (ZYLOPRIM) 300 MG tablet, [RxNorm: 663757] 8/23/2021 Take 300 mg by mouth daily. Active   amLODIPine 10 mg tablet, [RxNorm: 465643] 2/9/2024 Take 1 tablet orally once a day. Active   ATORVASTATIN 40MG TABLETS, [RxNorm: 662833] - TAKE 1 TABLET BY MOUTH EVERY DAY Active   bimatoprost (LUMIGAN) 0.01 % ophthalmic solution, [RxNorm: 3981614] 8/23/2021 1 drop daily. Active   blood glucose (ACCU-CHEK SHAILESH PLUS) test strip, [RxNorm: 0] 8/23/2021 Check glucose once daily Active   clopidogrel (PLAVIX) 75 MG tablet, [RxNorm: 083461] 8/23/2021 Take 75 mg by mouth daily. Active   dorzolamide-timolol (COSOPT) 22.3-6.8 MG/ML ophthalmic solution, [RxNorm: 8417574] 8/23/2021 INT 1 GTT IN OU BID Active   furosemide 20 mg tablet,  [RxNorm: 408262] 2/14/2024 Take 1 tablet orally once a day. Active   glipizide-metformin (METAGLIP) 2.5-500 MG per tablet, [RxNorm: 779499] 8/23/2021 Take 1 tablet by mouth 2 times daily (with meals). Active   losartan 50 mg tablet, [RxNorm: 466530] 2/8/2024 Take 1 tablet orally once a day. Active   metoprolol succinate  mg tablet,extended release 24 hr, [RxNorm: 352950] 10/8/2021 Take one and a half tablet orally once a day. Active   terazosin (HYTRIN) 2 MG capsule, [RxNorm: 090974] 8/23/2021 TK 1 C PO QD Active     ASSESSMENT    In conclusion,this pleasant 89 year old male with coronary disease prior bypass mitral regurg mild AS and carotid disease who is presently doing well with no heart failure symptoms. His stress test shows no ischemia. Ejection fraction is probably incorrect. Based on history and recent echo. With history we will monitor clinically and repeat echo before next visit in 6 months. For now continue same meds and they will call if new symptoms. Patient may benefit from extra protein in his diet with decreased appetite. Recheck cholesterol next summer. Echo completed 6 months just prior to follow-up visitFollow-up 6 months with  this note used Dragon technology. Transcription errors are not uncommon and may not have been corrected prior to electronically signing the note. Should you find these errors please consult the clinician for interpretation (or apply common sense adjustment when safe and appropriate).     FAMILY HISTORY    FAMILY HISTORY  Relationship Age Diagnosis   Mother 66 Stroke (Reason for death)     GENERAL STATUS    No data available    PAST MEDICAL HISTORY    PAST MEDICAL HISTORY  Problem Date diagonsed Date resolved Status   Acute heart failure with normal ejection fraction, [SNOMED-CT: 039498156] 10/8/2021 - Active   Anemia, unspecified, [SNOMED-CT: 630395137] 10/8/2021 - Active   Atherosclerosis of native coronary artery of native heart without angina pectoris,  [SNOMED-CT: 530301563] 2/10/2020 - Active   Other hyperlipidemia, [SNOMED-CT: 18957859] 2/10/2020 - Active   Murmur, [SNOMED-CT: 03567226] 2/10/2020 - Active   Carotid artery stenosis, bilateral, [SNOMED-CT: 627056805] 2/10/2020 - Active   Other hyperlipidemia, [SNOMED-CT: 59725389] 8/23/2021 - Active     HISTORY OF PRESENT ILLNESS    This a pleasant 89-year-old with coronary disease known bypass in 2009 carotid endarterectomy hypertension and elevated cholesterol presents for follow-up. Since last seen he had pneumonia and bronchitis which is now improved. He is scheduled for a swallow eval because of trouble swallowing after prior carotid surgery. He is poorly functional class I. He has no fluid retention. He had a recent PET stress test showing normal perfusion. Resting EF is 35% with exercise EF 55% his echo in January shows his LV is normal with an aortic range of 10 and mild MR. His LDL 40 in June     IMMUNIZATIONS    No data available    PLAN OF CARE    PLAN OF CARE  Planned Care Date   Echocardiography - Complete 1/1/1900   Referral Visit - Priyank Rodriguez ([email or web address]) : 1/1/1900   Follow up visit - Felix Shaw MD 3/1/2025     PROCEDURES    No data available    RESULTS    RESULTS  Name Result Date Location - Ordered By   GLUCOSE [LOINC: 2339-0] 222 mg/dL [High] 02/13/2024 03:24:00 PM [facility] ([facility])  Address: [address] [facility]  [address]  tel:   SODIUM [LOINC: 2951-2] 138 mmol/L 02/13/2024 03:24:00 PM [facility] ([facility])  Address: [address]  [facility]  [address]  tel:   POTASSIUM [LOINC: 2823-3] 4.6 mmol/L 02/13/2024 03:24:00 PM [facility] ([facility])  Address: [address]  [facility]  [address]  tel:   CHLORIDE [LOINC: 2075-0] 110 mmol/L 02/13/2024 03:24:00 PM [facility] ([facility])  Address: [address] [facility]  [address]  tel:   CO2  [LOINC: 2028-9] 22.0 mmol/L 02/13/2024 03:24:00 PM [facility] ([facility])  Address: [address]  [facility]  [address]  tel:   ANION GAP [LOINC: 33037-3] 6 mmol/L 02/13/2024 03:24:00 PM [facility] ([facility])  Address: [address]  [facility]  [address]  tel:   BUN [LOINC: 6299-2] 37 mg/dL [High] 02/13/2024 03:24:00 PM [facility] ([facility])  Address: [address]  [facility]  [address]  tel:   CREATININE [LOINC: 07967-7] 1.27 mg/dL 02/13/2024 03:24:00 PM [facility] ([facility])  Address: [address]  [facility]  [address]  tel:   BUN/ CREAT RATIO [LOINC: 3097-3] 29.1 [High] 02/13/2024 03:24:00 PM [facility] ([facility])  Address: [address]  [facility]  [address]  tel:   CALCIUM [LOINC: 44281-7] 10.0 mg/dL 02/13/2024 03:24:00 PM [facility] ([facility])  Address: [address]  [facility]  [address]  tel:   OSMOLALITY CALCULATED [LOINC: 80482-4] 302 mOsm/kg [High] 02/13/2024 03:24:00 PM [facility] ([facility])  Address: [address]  [facility]  [address]  tel:   E GFR CR [LOINC: 51255-0] 55 mL/min/1.73m2 [Low] 02/13/2024 03:24:00 PM [facility] ([facility])  Address: [address]  [facility]  [address]  tel:   FASTING PATIENT BMP ANSWER [LOINC: 49782-2] No 02/13/2024 03:24:00 PM [facility] ([facility])  Address: [address] [facility]  [address]  tel:   Nuclear PET 1.Stress EKG is normal. 2.Frequent PVC's, with long runs of trigeminy.3.No chest discomfort.1.This is a normal perfusion study, no perfusion defects noted. 2.The left ventricular cavity is noted to be normal on the stress studies. The stress left ventricular ejection fraction was calculated to be 51% and left ventricular global function is normal. The rest left ventricular cavity is noted  to be normal. The rest left ventricular ejection fraction was calculated to be 35% and rest left ventricular global function is moderately reduced. 3.This scan is suggestive of low risk for future cardiovascular events. 4.The study quality is average. 8/13/2024 8:00:00 AM Felix Shaw MD   Trans Thoracic Echocardiogram 1.The study quality is good. 2.The left ventricle is normal in size Global left ventricular systolic function is normal. Regional wall motion analysis shows hypokinesis of basal inferior segment. The left ventricular ejection fraction is 59%. Left ventricular diastolic function is indeterminate.3.The right ventricle is normal in size. Right ventricular systolic function is mildly decreased.4.The left atrial diameter is mildly increased. Left atrial diameter is 4.5 cms. The left atrium is severely enlarged based on the left atrium volume index of 49.9ml/m².5.The right atrium is mildly enlarged. 6. Mild calcification of the aortic valve is noted with adequate cuspal excursion. The aortic valve is tricuspid. Mild aortic valve stenosis is present. The trans-aortic mean gradient is 10.0 mmHg. 7.The anterior mitral leaflet is mildly thickened. The posterior mitral leaflet is mildly thickened. Mitral valve prolapse is noted. Mild mitral regurgitation.8.Mild tricuspid regurgitation. 9.The left atrial diameter is moderately increased. 1/30/2024 11:00:00 AM Felix Moon MD     REVIEW OF SYSTEMS    REVIEW OF SYSTEMS  Header Details   Cardiovascular No history of Chest pain, CASTRO, Palpitations, Syncope, PND, Orthopnea, Edema, Claudication   Respiratory No history of SOB, Wheezing, Sputum   Hem/Lymphatic No history of Easy bruising, Blood clots, Hx of blood transfusion, Anemia, Bleeding problems     SOCIAL HISTORY    SOCIAL HISTORY  Social History Element Description Effective Dates   Smoking status Never smoked -          Organization   [facility]  [phone] (Work)  [address]  [facility], [address]  [place] [address]  [place] [address]     Encounter Providers Encounter Date    Aug. 20, 2024August 20, 2024     Legal Authenticator    Felix Moon

## 2025-01-12 NOTE — CONSULTS
[facility]  part of [facility]    Cardiology Consultation    Mohamud Fitzpatrick Patient Status:  Emergency    1935 MRN [id number]   Location [facility] EMERGENCY DEPARTMENT Attending Johann Mckeon MD   Hosp Day # 0 PCP Priyank Rodriguez MD     Date of Admission:  2025  Date of Consult:  2025  Reason for Consultation: CHF    History of Present Illness:   Patient is a 89 year old male with significant past medical history of CAD s/p bypass, HFpEF, and anemia who presents with volume overload.  Patient reports gradual worsening of bilateral lower extremity edema and testicular swelling.  In the ED, labs with proBNP elevated to 15,464.  Per daughter patient has been adherent with his oral Lasix 20 mg daily.  However over the past few weeks has been eating a lot of takeout, specifically soups from [employer].  Assessment and Plan:   Anemia    HFpEF, acute on chronic  -Presents with overload with increased BLE edema and testicular swelling  -Labs with elevated proBNP of 15,464  -Last TTE with EF 59% with (2024); however did have stress PET in 2024 that although was negative for perfusion did show a resting EF of 35% with stress EF of 51%, discrepancy and low EF was attributed to frequent ectopy during study  -Etiology of acute decompensated HFpEF likely due to dietary nonadherence, given increased consumption of takeout including soups from [employer]  -Await CXR  -Obtain TTE  -In the interim, start IV diuresis with furosemide 40 mg twice daily  -Strict I/O's, closely monitor hemodynamics, closely monitor renal function and electrolytes    CAD s/p bypass  -Currently no chest pain or anginal equivalent symptoms  -Continue ASCVD risk factor management with BP control, antiplatelet and lipid-lowering therapies    Past Medical History  Past Medical History:    Acute congestive heart failure, unspecified heart failure type (HCC)    Atherosclerosis of coronary  artery    Congestive heart disease (HCC)    Coronary atherosclerosis    Diabetes (HCC)    Essential hypertension    Former smoker    High blood pressure    History of quadruple bypass    Pressure ulcer       Past Surgical History  Past Surgical History:   Procedure Laterality Date    Cabg  05/2009    Carotid endarterectomy      Colonoscopy      Other surgical history         Family History  Family History   Problem Relation Age of Onset    No Known Problems Father     Other (Other) Mother     Heart Disorder Brother        Social History  Pediatric History   Patient Parents    Not on file     Other Topics Concern    Not on file   Social History Narrative    Not on file           Current Medications:  No current facility-administered medications for this encounter.     (Not in a hospital admission)      Allergies  Allergies[1]    Review of Systems:   ROS  10 point review of systems completed and negative except as noted.    Physical Exam:   Patient Vitals for the past 24 hrs:   BP Temp Temp src Pulse Resp SpO2   01/12/25 1415 140/54 -- -- 73 15 95 %   01/12/25 1400 128/61 -- -- 63 13 96 %   01/12/25 1158 98/62 97.9 °F (36.6 °C) Oral (!) 43 18 96 %       Intake/Output:   Last 3 shifts:   Vent Settings:    Hemodynamic parameters (last 24 hours):    Scheduled Meds:   Continuous Infusions:     Physical Exam:  General: Alert and oriented x 3. No apparent distress.   HEENT: Normocephalic, anicteric sclera, neck supple, no thyromegaly or adenopathy.  Neck: +jVD, carotids 2+, no bruits.  Cardiac: Regular rate and rhythm. S1, S2 normal.   Lungs: Clear without wheezes, rales, rhonchi or dullness.  Normal excursions and effort.  Abdomen: Soft, non-tender. No organosplenomegally, mass or rebound, BS-present.  Extremities: 2+ piting BLE edema up to thighs  Neurologic: Alert and oriented, normal affect. No focal defects  Skin: Warm and dry.     Results:   Laboratory Data:  Lab Results   Component Value Date    WBC 7.0 01/12/2025     HGB 8.5 (L) 01/12/2025    HCT 27.4 (L) 01/12/2025    .0 01/12/2025    CREATSERUM 1.08 01/12/2025    BUN 28 (H) 01/12/2025     01/12/2025    K 4.5 01/12/2025     01/12/2025    CO2 22.0 01/12/2025     (H) 01/12/2025    CA 9.5 01/12/2025    ALB 4.2 10/08/2024    ALKPHO 98 10/08/2024    TP 6.9 10/08/2024    AST 8 10/08/2024    ALT 11 10/08/2024    TSH 1.193 06/08/2024    MG 1.6 01/12/2025    TROP <0.045 09/26/2021    B12 >2,000 (H) 12/07/2024         Recent Labs   Lab 01/12/25  1246   *   BUN 28*   CREATSERUM 1.08   CA 9.5      K 4.5      CO2 22.0     Recent Labs   Lab 01/12/25  1246   RBC 3.23*   HGB 8.5*   HCT 27.4*   MCV 84.8   MCH 26.3   MCHC 31.0   RDW 15.0   NEPRELIM 4.56   WBC 7.0   .0       No results for input(s): \"BNPML\" in the last 168 hours.    No results for input(s): \"TROP\", \"CK\" in the last 168 hours.    Imaging:  No results found.    Thank you for allowing me to participate in the care of your patient.    Priyank Geronimo, [facility]         [1] No Known Allergies

## 2025-01-13 ENCOUNTER — APPOINTMENT (OUTPATIENT)
Dept: CV DIAGNOSTICS | Facility: HOSPITAL | Age: 89
End: 2025-01-13
Attending: NURSE PRACTITIONER
Payer: MEDICARE

## 2025-01-13 LAB
ALBUMIN SERPL-MCNC: 3.8 G/DL (ref 3.2–4.8)
ALBUMIN/GLOB SERPL: 1.7 {RATIO} (ref 1–2)
ALP LIVER SERPL-CCNC: 80 U/L
ALT SERPL-CCNC: 11 U/L
ANION GAP SERPL CALC-SCNC: 8 MMOL/L (ref 0–18)
AST SERPL-CCNC: <8 U/L (ref ?–34)
ATRIAL RATE: 61 BPM
BASOPHILS # BLD AUTO: 0.06 X10(3) UL (ref 0–0.2)
BASOPHILS NFR BLD AUTO: 0.9 %
BILIRUB SERPL-MCNC: 0.4 MG/DL (ref 0.2–1.1)
BUN BLD-MCNC: 24 MG/DL (ref 9–23)
BUN/CREAT SERPL: 22.9 (ref 10–20)
CALCIUM BLD-MCNC: 9.5 MG/DL (ref 8.7–10.4)
CHLORIDE SERPL-SCNC: 109 MMOL/L (ref 98–112)
CO2 SERPL-SCNC: 24 MMOL/L (ref 21–32)
CREAT BLD-MCNC: 1.05 MG/DL
DEPRECATED RDW RBC AUTO: 45 FL (ref 35.1–46.3)
EGFRCR SERPLBLD CKD-EPI 2021: 69 ML/MIN/1.73M2 (ref 60–?)
EOSINOPHIL # BLD AUTO: 1.26 X10(3) UL (ref 0–0.7)
EOSINOPHIL NFR BLD AUTO: 19.1 %
ERYTHROCYTE [DISTWIDTH] IN BLOOD BY AUTOMATED COUNT: 15 % (ref 11–15)
GLOBULIN PLAS-MCNC: 2.2 G/DL (ref 2–3.5)
GLUCOSE BLD-MCNC: 95 MG/DL (ref 70–99)
GLUCOSE BLDC GLUCOMTR-MCNC: 112 MG/DL (ref 70–99)
GLUCOSE BLDC GLUCOMTR-MCNC: 164 MG/DL (ref 70–99)
GLUCOSE BLDC GLUCOMTR-MCNC: 187 MG/DL (ref 70–99)
GLUCOSE BLDC GLUCOMTR-MCNC: 233 MG/DL (ref 70–99)
HCT VFR BLD AUTO: 26.8 %
HGB BLD-MCNC: 8.4 G/DL
IMM GRANULOCYTES # BLD AUTO: 0.01 X10(3) UL (ref 0–1)
IMM GRANULOCYTES NFR BLD: 0.2 %
LYMPHOCYTES # BLD AUTO: 0.99 X10(3) UL (ref 1–4)
LYMPHOCYTES NFR BLD AUTO: 15 %
MAGNESIUM SERPL-MCNC: 1.7 MG/DL (ref 1.6–2.6)
MCH RBC QN AUTO: 26.1 PG (ref 26–34)
MCHC RBC AUTO-ENTMCNC: 31.3 G/DL (ref 31–37)
MCV RBC AUTO: 83.2 FL
MONOCYTES # BLD AUTO: 0.56 X10(3) UL (ref 0.1–1)
MONOCYTES NFR BLD AUTO: 8.5 %
NEUTROPHILS # BLD AUTO: 3.71 X10 (3) UL (ref 1.5–7.7)
NEUTROPHILS # BLD AUTO: 3.71 X10(3) UL (ref 1.5–7.7)
NEUTROPHILS NFR BLD AUTO: 56.3 %
OSMOLALITY SERPL CALC.SUM OF ELEC: 296 MOSM/KG (ref 275–295)
P AXIS: 107 DEGREES
P-R INTERVAL: 250 MS
PLATELET # BLD AUTO: 195 10(3)UL (ref 150–450)
POTASSIUM SERPL-SCNC: 4.2 MMOL/L (ref 3.5–5.1)
PROT SERPL-MCNC: 6 G/DL (ref 5.7–8.2)
Q-T INTERVAL: 478 MS
QRS DURATION: 134 MS
QTC CALCULATION (BEZET): 481 MS
R AXIS: -61 DEGREES
RBC # BLD AUTO: 3.22 X10(6)UL
SODIUM SERPL-SCNC: 141 MMOL/L (ref 136–145)
T AXIS: 92 DEGREES
VENTRICULAR RATE: 61 BPM
WBC # BLD AUTO: 6.6 X10(3) UL (ref 4–11)

## 2025-01-13 PROCEDURE — 93306 TTE W/DOPPLER COMPLETE: CPT | Performed by: NURSE PRACTITIONER

## 2025-01-13 PROCEDURE — 99233 SBSQ HOSP IP/OBS HIGH 50: CPT | Performed by: INTERNAL MEDICINE

## 2025-01-13 RX ORDER — MAGNESIUM OXIDE 400 MG/1
400 TABLET ORAL ONCE
Status: COMPLETED | OUTPATIENT
Start: 2025-01-13 | End: 2025-01-13

## 2025-01-13 RX ORDER — DIPHENHYDRAMINE HYDROCHLORIDE, ZINC ACETATE 2; .1 G/100G; G/100G
1 CREAM TOPICAL 3 TIMES DAILY PRN
Status: DISCONTINUED | OUTPATIENT
Start: 2025-01-13 | End: 2025-01-16

## 2025-01-13 RX ORDER — LOSARTAN POTASSIUM 50 MG/1
50 TABLET ORAL DAILY
Status: DISCONTINUED | OUTPATIENT
Start: 2025-01-13 | End: 2025-01-14

## 2025-01-13 RX ORDER — FUROSEMIDE 10 MG/ML
40 INJECTION INTRAMUSCULAR; INTRAVENOUS
Status: DISCONTINUED | OUTPATIENT
Start: 2025-01-13 | End: 2025-01-15

## 2025-01-13 RX ORDER — METOPROLOL TARTRATE 25 MG/1
25 TABLET, FILM COATED ORAL
Status: DISCONTINUED | OUTPATIENT
Start: 2025-01-13 | End: 2025-01-16

## 2025-01-13 NOTE — PROGRESS NOTES
[facility] Cardiology Progress Note    Mohamud Fitzpatrick Patient Status:  Inpatient    1935 MRN [id number]   Location [facility] 3W/SW Attending Sariah Solomon MD   Hosp Day # 1 PCP Priyank Rodriguez MD     Subjective:  Denies cp, sob at rest, orthopnea . + BLE swelling   Up eating breakfast in bed. Daughter at bedside     Objective:  /64 (BP Location: Right arm)   Pulse 74   Temp 97.9 °F (36.6 °C) (Oral)   Resp 16   Wt 108 lb 14.4 oz (49.4 kg)   SpO2 96%   BMI 22.76 kg/m²     Telemetry: NSR w/ PVCs & transient NSVT , 62 bpm       Intake/Output:    Intake/Output Summary (Last 24 hours) at 2025 0844  Last data filed at 2025 0600  Gross per 24 hour   Intake 0 ml   Output 1125 ml   Net -1125 ml       Last 3 Weights   25 0402 108 lb 14.4 oz (49.4 kg)   25 1539 113 lb 3.2 oz (51.3 kg)   25 1425 114 lb (51.7 kg)   24 0843 109 lb 12.8 oz (49.8 kg)       Labs:  Recent Labs   Lab 25  1246 25  0750   * 95   BUN 28* 24*   CREATSERUM 1.08 1.05   EGFRCR 66 69   CA 9.5 9.5    141   K 4.5 4.2    109   CO2 22.0 24.0     Recent Labs   Lab 25  1246 25  0750   RBC 3.23* 3.22*   HGB 8.5* 8.4*   HCT 27.4* 26.8*   MCV 84.8 83.2   MCH 26.3 26.1   MCHC 31.0 31.3   RDW 15.0 15.0   NEPRELIM 4.56 3.71   WBC 7.0 6.6   .0 195.0         Recent Labs   Lab 25  1246   TROPHS 32       Diagnostics:         Review of Systems   Respiratory:  Negative for cough and shortness of breath.    Cardiovascular:  Positive for leg swelling. Negative for chest pain, palpitations and orthopnea.     Physical Exam:    General: Alert and oriented x 3. No apparent distress.   HEENT: Normocephalic, anicteric sclera, neck supple, no thyromegaly or adenopathy.  Neck: No JVD, carotids 2+, no bruits.  Cardiac: Regular rate & rhythm. S1, S2 normal. No pericardial rub, S3, or extra cardiac sounds. + Murmur   Lungs: Clear without wheezes, rales,  rhonchi or dullness.  Normal excursions and effort.  Abdomen: Soft, non-tender. No organosplenomegally, mass or rebound, BS-present.  Extremities: Without clubbing or cyanosis. +1 BLE edema up to thighs. +Scrotal swelling   Neurologic: Alert and oriented, normal affect. No focal defects  Skin: Warm and dry.       Medications:   furosemide  20 mg Intravenous BID (Diuretic)    insulin aspart  1-11 Units Subcutaneous TID CC    heparin  5,000 Units Subcutaneous Q8H GIOVANNI    allopurinol  300 mg Oral Daily    atorvastatin  40 mg Oral Nightly    clopidogrel  75 mg Oral Daily    miconazole   Topical BID         Assessment:    Acute on chronic HFpEF , EF 59% in 2024  - presented w/ volume increased BLE & testicular swelling after recent after dietary non adherence for the last few weeks   - pBNP 15,464 & cxr w/ mild pulm edema on admit   - on lasix 20mg ivp bid. Net neg 1.1L over 24h. Scr stable   - 2 gm Na diet , 2L fluid restriction   - monitor strict I/o, daily wts & renal fx closely   - GMDT : historically on toprol xl 150mg daily & losartan 50 mg daily per last OV   - repeat echocardiogram pending     CAD , s/p remote CABG   - denies anginal sx. Trop neg x 2 . EKG pending . Echo as above   - on plavix, atorvastatin     HTN   - above goal. historically on amlodipine (10mg daily), toprol xl & losartan     HLD   - last ldl 40, on atorvastatin     T2DM   - hgA1C = 6.2 %, on insulin per PMD     Chronic anemia   - hgb stable     Plan:    Good uop . Net neg 1.1L over 24h. Scr stable . Lasix 40mg ivp bid    GDMT: resume losartan 50mg daily. SR w/ bigeminy & transient nsvt noted. Start lopressor 25mg bid. Optimize gdmt as bp & renal fx permits  Replenish electrolytes per cardiac protocol as needed. Keep K > 4, Mg > 2   Monitor strict I/o, daily wts & renal fx closely   Echo pending     STEPHEN Anna  1/13/2025  8:44 AM  Ph [phone] ([place])  Ph [phone] ([place])      Cardiology attending    HFpEF.  Diuresing well.   Volume status improved with reduction in edema.    Continue IV Lasix twice daily.    Begin SGLT2 inhibitor at discharge, when done with forced diuresis.

## 2025-01-13 NOTE — PHYSICAL THERAPY NOTE
PHYSICAL THERAPY EVALUATION - INPATIENT     Room Number: 306/306-A  Evaluation Date: 2025  Type of Evaluation: Initial   Physician Order: PT Eval and Treat    Presenting Problem: CHF  Co-Morbidities : cad, cabg  Reason for Therapy: Mobility Dysfunction and Discharge Planning    PHYSICAL THERAPY ASSESSMENT   Patient is a 89 year old male admitted 2025 for CHF.  Prior to admission, patient's baseline is independent in ADL's and ambulation with cane.  Patient is currently functioning near baseline with bed mobility, transfers, and gait.  Patient is requiring contact guard assist as a result of the following impairments: decreased functional strength and medical status.  Physical Therapy will continue to follow for duration of hospitalization.    Patient will benefit from continued skilled PT Services at discharge to promote prior level of function and safety with additional support and return home with home health PT.    PLAN DURING HOSPITALIZATION  Nursing Mobility Recommendation : 1 Assist  PT Device Recommendation: None  PT Treatment Plan: Body mechanics;Bed mobility;Patient education;Gait training;Transfer training;Balance training  Rehab Potential : Good  Frequency (Obs): 3-5x/week     PHYSICAL THERAPY MEDICAL/SOCIAL HISTORY   Problem List  Principal Problem:    Congestive heart failure, unspecified HF chronicity, unspecified heart failure type (HCC)    HOME SITUATION  Type of Home: Condo  Home Layout: One level  Stairs to Enter : 0   Railing: No    Stairs to Bedroom: 0    Railing: No    Lives With: Spouse        Patient Regularly Uses: Cane     SUBJECTIVE  \"I have my mind, it's 100%\"    PHYSICAL THERAPY EXAMINATION   OBJECTIVE  Precautions: Cardiac;Bed/chair alarm  Fall Risk: Standard fall risk    PAIN ASSESSMENT  Ratin          COGNITION  Overall Cognitive Status:  WFL - within functional limits    RANGE OF MOTION AND STRENGTH ASSESSMENT  Lower extremity ROM is within functional limits  BLE  WNL  Lower extremity strength is within functional limits  BLE WNL    BALANCE  Static Sitting: Good  Dynamic Sitting: Fair +  Static Standing: Fair  Dynamic Standing: Fair -    AM-PAC '6-Clicks' INPATIENT SHORT FORM - BASIC MOBILITY  How much difficulty does the patient currently have...  Patient Difficulty: Turning over in bed (including adjusting bedclothes, sheets and blankets)?: None   Patient Difficulty: Sitting down on and standing up from a chair with arms (e.g., wheelchair, bedside commode, etc.): A Little   Patient Difficulty: Moving from lying on back to sitting on the side of the bed?: A Little   How much help from another person does the patient currently need...   Help from Another: Moving to and from a bed to a chair (including a wheelchair)?: A Little   Help from Another: Need to walk in hospital room?: A Little   Help from Another: Climbing 3-5 steps with a railing?: A Little     AM-PAC Score:  Raw Score: 19   Approx Degree of Impairment: 41.77%   Standardized Score (AM-PAC Scale): 45.44   CMS Modifier (G-Code): CK    FUNCTIONAL ABILITY STATUS  Functional Mobility/Gait Assessment  Gait Assistance: Contact guard assist  Distance (ft): 200ft  Assistive Device: Cane  Rolling:  not tested   Supine to Sit: supervision  Sit to Supine:  not tested   Sit to Stand: contact guard assist    Exercise/Education Provided:  Education Provided To: Patient  Patient Education: Role of Physical Therapy;Plan of Care;DME Recommendations;Functional Transfer Techniques;Gait Training  Patient's Response to Education: Verbalized Understanding           Skilled Therapy Provided: Patient on room air. Oxygen sat 91% and heart rate 68, blood pressure 145/77. After the session oxygen sat 95% and heart rate 72. Patient currently with CGA to SBA for mobility during the session with cane. Patient able to ambulate about 200ft with cane with CGA. Patient with shoes donned. Patient able to do bed mobility with SBA. Patient reports  feeling much better since swelling decreased. The patient's Approx Degree of Impairment: 41.77% has been calculated based on documentation in the [facility] '6 clicks' Inpatient Basic Mobility Short Form.  Research supports that patients with this level of impairment may benefit from home with home health. Patient received semi-fowlers in bed, agreeable to physical therapy evaluation. Next session anticipate to progress bed mobility, transfers, and gait.    Patient history and/or personal factors that may impact the plan of care include home accessibility concerns. Based on the physical therapy examination of the noted systems and functional activity/participation limitations, the patient presentation is stable given the patient is functioning near baseline level.     Final disposition will be made by interdisciplinary medical team.    Patient End of Session: Up in chair;Needs met;Call light within reach;RN aware of session/findings;All patient questions and concerns addressed;Family present    CURRENT GOALS  Goals to be met by: 1/31/25  Patient Goal Patient's self-stated goal is: to go home   Goal #1 Patient is able to demonstrate supine - sit EOB @ level: independent     Goal #1   Current Status    Goal #2 Patient is able to demonstrate transfers Sit to/from Stand at assistance level: modified independent with cane - straight     Goal #2  Current Status    Goal #3 Patient is able to ambulate 100 feet with assist device: cane - straight at assistance level: modified independent   Goal #3   Current Status    Goal #4    Goal #4   Current Status    Goal #5 Patient to demonstrate independence with home activity/exercise instructions provided to patient in preparation for discharge.   Goal #5   Current Status    Goal #6    Goal #6  Current Status      Patient Evaluation Complexity Level:  History Low - no personal factors and/or co-morbidities   Examination of body systems Low -  addressing 1-2 elements   Clinical  Presentation Low- Stable   Clinical Decision Making  Low Complexity     Gait Training: 10 minutes  Therapeutic Activity:  13 minutes

## 2025-01-13 NOTE — OCCUPATIONAL THERAPY NOTE
OCCUPATIONAL THERAPY EVALUATION - INPATIENT     Room Number: 306/306-A  Evaluation Date: 1/13/2025  Type of Evaluation: Initial       Physician Order: IP Consult to Occupational Therapy  Reason for Therapy: ADL/IADL Dysfunction and Discharge Planning    OCCUPATIONAL THERAPY ASSESSMENT   Patient is a 89 year old male admitted 1/12/2025 for CHF, leg and scrotal swelling.  Prior to admission, patient's baseline is independent with ADLs, ambulates with a cane.  Patient is currently functioning below baseline with  self care/ADLs .  Patient is requiring contact guard assist and minimal assist as a result of the following impairments: decreased functional strength and decreased endurance. Occupational Therapy will continue to follow for duration of hospitalization.    Patient will benefit from continued skilled OT Services at discharge to promote prior level of function and safety with additional support and return home with home health OT.    PLAN DURING HOSPITALIZATION  OT Device Recommendations: An patel  OT Treatment Plan: Balance activities;Energy conservation/work simplification techniques;ADL training;Functional transfer training;Endurance training;Patient/Family education;Compensatory technique education     OCCUPATIONAL THERAPY MEDICAL/SOCIAL HISTORY   Problem List  Principal Problem:    Congestive heart failure, unspecified HF chronicity, unspecified heart failure type (HCC)    HOME SITUATION  Type of Home: Condo  Home Layout: One level  Lives With: Spouse  Shower/Tub and Equipment: Walk-in shower; Shower chair  Patient Regularly Uses: Cane    Stairs in Home: none  Use of Assistive Device(s): cane    Prior Level of [place]: Pt lives with his wife (who is not able to assist) in a condo. He is independent with ADLs. Has a walk in shower with a chair    SUBJECTIVE  \"It feels good to walk\"    OCCUPATIONAL THERAPY EXAMINATION      OBJECTIVE  Precautions: Cardiac; Bed/chair alarm  Fall Risk: Standard fall  risk      PAIN ASSESSMENT  Ratin      ACTIVITY TOLERANCE  Pulse: 63  Heart Rate Source: Monitor                   O2 SATURATIONS  Oxygen Therapy  SPO2% on Room Air at Rest: 91  SPO2% Ambulation on Room Air: 92    COGNITION  Pt able to follow simple commands        Communication: wfl    Behavioral/Emotional/Social: wfl    RANGE OF MOTION   Upper extremity ROM is within functional limits     STRENGTH ASSESSMENT  Upper extremity strength is within functional limits     COORDINATION  Gross Motor: WFL   Fine Motor: WFL     ACTIVITIES OF DAILY LIVING ASSESSMENT  AM-PAC ‘6-Clicks’ Inpatient Daily Activity Short Form  How much help from another person does the patient currently need…  -   Putting on and taking off regular lower body clothing?: A Little  -   Bathing (including washing, rinsing, drying)?: A Little  -   Toileting, which includes using toilet, bedpan or urinal? : A Little  -   Putting on and taking off regular upper body clothing?: A Little  -   Taking care of personal grooming such as brushing teeth?: A Little  -   Eating meals?: None    AM-PAC Score:  Score: 19  Approx Degree of Impairment: 42.8%  Standardized Score (AM-PAC Scale): 40.22  CMS Modifier (G-Code): CK    FUNCTIONAL TRANSFER ASSESSMENT  Sit to Stand: Edge of Bed  Edge of Bed: Contact Guard Assist    BED MOBILITY  Supine to Sit : Contact Guard Assist    BALANCE ASSESSMENT  Static Sitting: Stand-by Assist  Static Standing: Contact Guard Assist    FUNCTIONAL ADL ASSESSMENT  Eating: Independent  Grooming Standing: Contact Guard Assist  Bathing Seated: Minimal Assist  UB Dressing Seated: Stand-by Assist  LB Dressing Seated: Contact Guard Assist  Toileting Standing: Contact Guard Assist    THERAPEUTIC EXERCISE     Skilled Therapy Provided: RN approved session. Pt received in the bed, agreeable to tx. Pt reports \"no\" pain. Pt's wife present. Pt tolerated activity well. Pt transferred with CGA and ambulated with a cane with a flexed posture. Discussed  pacing strategies and energy conservation techniques. Will continue to follow.     EDUCATION PROVIDED  Patient Education : Role of Occupational Therapy; Plan of Care; Discharge Recommendations; Functional Transfer Techniques; Energy Conservation; Fall Prevention  Patient's Response to Education: Verbalized Understanding    The patient's Approx Degree of Impairment: 42.8% has been calculated based on documentation in the [facility] '6 clicks' Inpatient Daily Activity Short Form.  Research supports that patients with this level of impairment may benefit from home with HH.  Final disposition will be made by interdisciplinary medical team.     Patient End of Session: Up in chair;Needs met;Call light within reach;RN aware of session/findings;All patient questions and concerns addressed;Hospital anti-slip socks;Alarm set;Family present    OT Goals  Patients self stated goal is: to go home     Patient will complete functional transfer with supervision  Comment:     Patient will complete toileting with supervision  Comment:     Patient will tolerate standing for 3 minutes in prep for adls with supervision   Comment:    Patient will complete item retrieval with supervision  Comment:          Goals  on: 25  Frequency: 3-5x/week    Patient Evaluation Complexity Level:   Occupational Profile/Medical History MODERATE - Expanded review of history including review of medical or therapy record   Specific performance deficits impacting engagement in ADL/IADL MODERATE  3 - 5 performance deficits   Client Assessment/Performance Deficits MODERATE - Comorbidities and min to mod modifications of tasks    Clinical Decision Making MODERATE - Analysis of occupational profile, detailed assessments, several treatment options    Overall Complexity MODERATE     OT Session T    Therapeutic Activity: 15 minutes

## 2025-01-13 NOTE — PLAN OF CARE
Problem: Patient Centered Care  Goal: Patient preferences are identified and integrated in the patient's plan of care  Description: Interventions:  - What would you like us to know as we care for you?   - Provide timely, complete, and accurate information to patient/family  - Incorporate patient and family knowledge, values, beliefs, and cultural backgrounds into the planning and delivery of care  - Encourage patient/family to participate in care and decision-making at the level they choose  - Honor patient and family perspectives and choices  Outcome: Progressing     Problem: METABOLIC/FLUID AND ELECTROLYTES - ADULT  Goal: Glucose maintained within prescribed range  Description: INTERVENTIONS:  - Monitor Blood Glucose as ordered  - Assess for signs and symptoms of hyperglycemia and hypoglycemia  - Administer ordered medications to maintain glucose within target range  - Assess barriers to adequate nutritional intake and initiate nutrition consult as needed  - Instruct patient on self management of diabetes  Outcome: Progressing  Goal: Electrolytes maintained within normal limits  Description: INTERVENTIONS:  - Monitor labs and rhythm and assess patient for signs and symptoms of electrolyte imbalances  - Administer electrolyte replacement as ordered  - Monitor response to electrolyte replacements, including rhythm and repeat lab results as appropriate  - Fluid restriction as ordered  - Instruct patient on fluid and nutrition restrictions as appropriate  Outcome: Progressing     Problem: SKIN/TISSUE INTEGRITY - ADULT  Goal: Skin integrity remains intact  Description: INTERVENTIONS  - Assess and document risk factors for pressure ulcer development  - Assess and document skin integrity  - Monitor for areas of redness and/or skin breakdown  - Initiate interventions, skin care algorithm/standards of care as needed  Outcome: Progressing     Problem: Impaired Activities of Daily Living  Goal: Achieve highest/safest level  of independence in self care  Description: Interventions:  - Assess ability and encourage patient to participate in ADLs to maximize function  - Promote sitting position while performing ADLs such as feeding, grooming, and bathing  - Educate and encourage patient/family in tolerated functional activity level and precautions during self-care  Outcome: Progressing

## 2025-01-13 NOTE — CARDIAC REHAB
CARDIAC REHAB HEART FAILURE EDUCATION    Handouts provided and reviewed: CHF Booklet.     Activity: Chair for all meals: yes       Ambulation:        Tolerated Activity:          Disease Process: Disease process reviewed.    Reviewed the following: DAILY WEIGHT MONITORING: Reviewed      SODIUM RESTRICTION: Reviewed      FLUID RESTRICTION: Reviewed      RISK FACTORS: Reviewed      SMOKING CESSATION: N/A      HOME EXERCISE ACTIVITY: Reviewed      WHEN TO CONTACT YOUR PHYSICIAN: Reviewed

## 2025-01-13 NOTE — PLAN OF CARE
Plan iv lasix and echo today.  Problem: Patient Centered Care  Goal: Patient preferences are identified and integrated in the patient's plan of care  Description: Interventions:  - What would you like us to know as we care for you? First language is [place]    Problem: SKIN/TISSUE INTEGRITY - ADULT  Goal: Skin integrity remains intact  Description: INTERVENTIONS  - Assess and document risk factors for pressure ulcer development  - Assess and document skin integrity  - Monitor for areas of redness and/or skin breakdown  - Initiate interventions, skin care algorithm/standards of care as needed  Outcome: Not Progressing     Problem: Impaired Activities of Daily Living  Goal: Achieve highest/safest level of independence in self care  Description: Interventions:  - Assess ability and encourage patient to participate in ADLs to maximize function  - Promote sitting position while performing ADLs such as feeding, grooming, and bathing  - Educate and encourage patient/family in tolerated functional activity level and precautions during self-care  - Provide support under elbow of weak side to prevent shoulder subluxation  Outcome: Not Progressing     - Provide timely, complete, and accurate information to patient/family  - Incorporate patient and family knowledge, values, beliefs, and cultural backgrounds into the planning and delivery of care  - Encourage patient/family to participate in care and decision-making at the level they choose  - Honor patient and family perspectives and choices  Outcome: Not Progressing     Problem: METABOLIC/FLUID AND ELECTROLYTES - ADULT  Goal: Glucose maintained within prescribed range  Description: INTERVENTIONS:  - Monitor Blood Glucose as ordered  - Assess for signs and symptoms of hyperglycemia and hypoglycemia  - Administer ordered medications to maintain glucose within target range  - Assess barriers to adequate nutritional intake and initiate nutrition consult as needed  - Instruct patient  on self management of diabetes  Outcome: Not Progressing  Goal: Electrolytes maintained within normal limits  Description: INTERVENTIONS:  - Monitor labs and rhythm and assess patient for signs and symptoms of electrolyte imbalances  - Administer electrolyte replacement as ordered  - Monitor response to electrolyte replacements, including rhythm and repeat lab results as appropriate  - Fluid restriction as ordered  - Instruct patient on fluid and nutrition restrictions as appropriate  Outcome: Not Progressing

## 2025-01-13 NOTE — PLAN OF CARE
IV lasix. Echo completed. PT/OT session today. Call light within reach. Safety precautions in place. Plan: Home w/ HHC pending medical clearance.   Problem: Patient Centered Care  Goal: Patient preferences are identified and integrated in the patient's plan of care  Description: Interventions:  - What would you like us to know as we care for you? Home w/ Family   - Provide timely, complete, and accurate information to patient/family  - Incorporate patient and family knowledge, values, beliefs, and cultural backgrounds into the planning and delivery of care  - Encourage patient/family to participate in care and decision-making at the level they choose  - Honor patient and family perspectives and choices  Outcome: Progressing     Problem: METABOLIC/FLUID AND ELECTROLYTES - ADULT  Goal: Glucose maintained within prescribed range  Description: INTERVENTIONS:  - Monitor Blood Glucose as ordered  - Assess for signs and symptoms of hyperglycemia and hypoglycemia  - Administer ordered medications to maintain glucose within target range  - Assess barriers to adequate nutritional intake and initiate nutrition consult as needed  - Instruct patient on self management of diabetes  Outcome: Progressing  Goal: Electrolytes maintained within normal limits  Description: INTERVENTIONS:  - Monitor labs and rhythm and assess patient for signs and symptoms of electrolyte imbalances  - Administer electrolyte replacement as ordered  - Monitor response to electrolyte replacements, including rhythm and repeat lab results as appropriate  - Fluid restriction as ordered  - Instruct patient on fluid and nutrition restrictions as appropriate  Outcome: Progressing     Problem: SKIN/TISSUE INTEGRITY - ADULT  Goal: Skin integrity remains intact  Description: INTERVENTIONS  - Assess and document risk factors for pressure ulcer development  - Assess and document skin integrity  - Monitor for areas of redness and/or skin breakdown  - Initiate  interventions, skin care algorithm/standards of care as needed  Outcome: Progressing     Problem: CARDIOVASCULAR - ADULT  Goal: Maintains optimal cardiac output and hemodynamic stability  Description: INTERVENTIONS:  - Monitor vital signs, rhythm, and trends  - Monitor for bleeding, hypotension and signs of decreased cardiac output  - Evaluate effectiveness of vasoactive medications to optimize hemodynamic stability  - Monitor arterial and/or venous puncture sites for bleeding and/or hematoma  - Assess quality of pulses, skin color and temperature  - Assess for signs of decreased coronary artery perfusion - ex. Angina  - Evaluate fluid balance, assess for edema, trend weights  Outcome: Progressing  Goal: Absence of cardiac arrhythmias or at baseline  Description: INTERVENTIONS:  - Continuous cardiac monitoring, monitor vital signs, obtain 12 lead EKG if indicated  - Evaluate effectiveness of antiarrhythmic and heart rate control medications as ordered  - Initiate emergency measures for life threatening arrhythmias  - Monitor electrolytes and administer replacement therapy as ordered  Outcome: Progressing

## 2025-01-13 NOTE — PROGRESS NOTES
[facility]  part of [facility]     Hospitalist Progress Note     Mohamud Fitzpatrick Patient Status:  Inpatient    1935 MRN [id number]   Location [facility] 3W/SW Attending Sariah Solomon MD   Hosp Day # 1 PCP Priyank Rodriguez MD     Subjective:     Patient laying in bed and in NAD. Denied any active complaints at the time of interview.   All questions and concerns addressed.    Objective:    Review of Systems:   ROS completed; pertinent positive and negatives stated in subjective.      Vital signs:  Temp:  [97.9 °F (36.6 °C)-98.3 °F (36.8 °C)] 98.3 °F (36.8 °C)  Pulse:  [53-74] 62  Resp:  [13-20] 16  BP: (126-148)/(47-86) 147/59  SpO2:  [92 %-96 %] 92 %      Physical Exam:    General: No acute distress. Alert and oriented x 3.  Respiratory: Clear to auscultation bilaterally. No wheezes. No rhonchi.  Cardiovascular: S1, S2. Regular rate and rhythm. No murmurs, no rubs or gallops. Equal pulses.   Abdomen: Soft, nontender, nondistended.  Positive bowel sounds. No rebound, guarding or organomegaly.  Neurologic: No focal neurological deficits. CNII-XII grossly intact.  Extremities: 2+ edema in bilateral LE.       Diagnostic Data:    Labs:  Recent Labs   Lab 25  1246 25  0750   WBC 7.0 6.6   HGB 8.5* 8.4*   MCV 84.8 83.2   .0 195.0       Recent Labs   Lab 25  1246 25  0750   * 95   BUN 28* 24*   CREATSERUM 1.08 1.05   CA 9.5 9.5   ALB  --  3.8    141   K 4.5 4.2    109   CO2 22.0 24.0   ALKPHO  --  80   AST  --  <8   ALT  --  11   BILT  --  0.4   TP  --  6.0       Estimated Creatinine Clearance: 34.6 mL/min (based on SCr of 1.05 mg/dL).    No results for input(s): \"PTP\", \"INR\" in the last 168 hours.           Imaging: Imaging data reviewed in Epic.    Medications:    furosemide  40 mg Intravenous BID (Diuretic)    losartan  50 mg Oral Daily    metoprolol tartrate  25 mg Oral 2x Daily(Beta Blocker)    insulin aspart  1-11 Units  Subcutaneous TID CC    heparin  5,000 Units Subcutaneous Q8H GIOVANNI    allopurinol  300 mg Oral Daily    atorvastatin  40 mg Oral Nightly    clopidogrel  75 mg Oral Daily    miconazole   Topical BID       Assessment & Plan:     HFpEF, acute on chronic  Hx of CAD s/p CABG  CXR reviewed  BNP elevated  Trop and EKG pending  Started on Lasix IV BID  Cardiology on consult  Continue lasix 40 mg IV BID  Resume losartan  Start lopressor  Echo with EF 55-60%  T2DM  A1c 6.2%  SSI and frequent accuchecks  HTN  BP well controlled  Continue home meds as indicated  Monitor vitals  HLD  Continue statin      Plan of care discussed with patient or family at bedside.      Supplementary Documentation:     Quality:  DVT Prophylaxis: Heparin s/c  CODE status: Full      Estimated date of discharge: TBD  Discharge is dependent on: clinical stability  At this point Mr. Fitzpatrick is expected to be discharge to: home             **Certification      PHYSICIAN Certification of Need for Inpatient Hospitalization - Initial Certification    Patient will require inpatient services that will reasonably be expected to span two midnight's based on the clinical documentation in H+P.   Based on patients current state of illness, I anticipate that, after discharge, patient will require TBD.      Sariah Solomon MD  Hospitalist    MDM: High, I personally reviewed the available laboratories, imaging including XR. I discussed the case with RN. I ordered laboratories, studies including AM labs.  Medical decision making high, risk is high.       The 21st Century Cures Act makes medical notes like these available to patients in the interest of transparency. Please be advised this is a medical document. Medical documents are intended to carry relevant information, facts as evident, and the clinical opinion of the practitioner. The medical note is intended as peer to peer communication and may appear blunt or direct. It is written in medical language and may contain  abbreviations or verbiage that are unfamiliar.

## 2025-01-13 NOTE — DISCHARGE INSTRUCTIONS
Going Home Instructions  In this section you will find the tools which will guide you through the first few days after you leave the hospital. Continued use of these tools will help you develop the skills necessary to keep your heart failure under control.     Home Care Instructions Following Heart Failure - the most important things to do every day include:   Weigh yourself and review the “Self-Check Plan” sheet every morning.   Call your cardiologist office if you are in the “Pay Attention-Use Caution” (yellow zone) or “Medical Alert-Warning!” (red zone) as outlined in the Self-Check Plan sheet.  Take your medicines as prescribed.  Limit your sodium (salt) intake.  Know when to call your cardiologist, primary doctor, or nurse.  Know when to seek emergency care.      Things for You to Remember:   1. See your doctor or healthcare provider as written on your discharge instructions.  It is important that you attend this appointment to make sure your symptoms are under control.     2. Your recommended sodium intake is 2732-1451 mg daily.    3.  Weigh yourself every day.    4. Some exercise and activity is important to help keep your heart functioning and strong. Unless instructed not to exercise, you may walk at a slow to moderate pace for 10-15 minutes 2-3 days per week to start. Pace your activity to prevent shortness of breath or fatigue. Stop exercising if you develop chest pain, lightheadedness, or significant shortness of breath.       Call Your Cardiologist If:   You gain 2-3 pounds in one day or 5 pounds in one week.  You have more difficulty breathing.  You are getting more tired with normal activity.  You are more short of breath lying down, or awaken at night short of breath.  You have swelling of your feet or legs.  You urinate less often during the day and more often at night.  You have cramps in your legs.  You have blurred vision or see yellowish-green halos around objects of lights.    Go to the  Emergency Room If:   You have pain or tightness in your chest  You are extremely short of breath  You are coughing up pink-frothy mucus  You are traveling and develop symptoms of worsening heart failure      ** Please follow up with your cardiologist or Advanced Practice Provider as written on your discharge instructions. If you are not provided with an appointment, let your nurse know so you can get an appointment**

## 2025-01-14 LAB
ALBUMIN SERPL-MCNC: 3.7 G/DL (ref 3.2–4.8)
ALBUMIN/GLOB SERPL: 1.5 {RATIO} (ref 1–2)
ALP LIVER SERPL-CCNC: 83 U/L
ALT SERPL-CCNC: 16 U/L
ANION GAP SERPL CALC-SCNC: 10 MMOL/L (ref 0–18)
AST SERPL-CCNC: <8 U/L (ref ?–34)
BASOPHILS # BLD AUTO: 0.07 X10(3) UL (ref 0–0.2)
BASOPHILS NFR BLD AUTO: 0.8 %
BILIRUB SERPL-MCNC: 0.4 MG/DL (ref 0.2–1.1)
BUN BLD-MCNC: 23 MG/DL (ref 9–23)
BUN/CREAT SERPL: 22.8 (ref 10–20)
CALCIUM BLD-MCNC: 9.4 MG/DL (ref 8.7–10.4)
CHLORIDE SERPL-SCNC: 107 MMOL/L (ref 98–112)
CO2 SERPL-SCNC: 25 MMOL/L (ref 21–32)
CREAT BLD-MCNC: 1.01 MG/DL
DEPRECATED RDW RBC AUTO: 44.2 FL (ref 35.1–46.3)
EGFRCR SERPLBLD CKD-EPI 2021: 72 ML/MIN/1.73M2 (ref 60–?)
EOSINOPHIL # BLD AUTO: 1.48 X10(3) UL (ref 0–0.7)
EOSINOPHIL NFR BLD AUTO: 17.9 %
ERYTHROCYTE [DISTWIDTH] IN BLOOD BY AUTOMATED COUNT: 15.1 % (ref 11–15)
GLOBULIN PLAS-MCNC: 2.5 G/DL (ref 2–3.5)
GLUCOSE BLD-MCNC: 133 MG/DL (ref 70–99)
GLUCOSE BLDC GLUCOMTR-MCNC: 122 MG/DL (ref 70–99)
GLUCOSE BLDC GLUCOMTR-MCNC: 202 MG/DL (ref 70–99)
GLUCOSE BLDC GLUCOMTR-MCNC: 208 MG/DL (ref 70–99)
GLUCOSE BLDC GLUCOMTR-MCNC: 277 MG/DL (ref 70–99)
HCT VFR BLD AUTO: 28.2 %
HGB BLD-MCNC: 9.4 G/DL
IMM GRANULOCYTES # BLD AUTO: 0.02 X10(3) UL (ref 0–1)
IMM GRANULOCYTES NFR BLD: 0.2 %
LYMPHOCYTES # BLD AUTO: 1.29 X10(3) UL (ref 1–4)
LYMPHOCYTES NFR BLD AUTO: 15.6 %
MAGNESIUM SERPL-MCNC: 1.7 MG/DL (ref 1.6–2.6)
MCH RBC QN AUTO: 27.5 PG (ref 26–34)
MCHC RBC AUTO-ENTMCNC: 33.3 G/DL (ref 31–37)
MCV RBC AUTO: 82.5 FL
MONOCYTES # BLD AUTO: 0.75 X10(3) UL (ref 0.1–1)
MONOCYTES NFR BLD AUTO: 9 %
NEUTROPHILS # BLD AUTO: 4.68 X10 (3) UL (ref 1.5–7.7)
NEUTROPHILS # BLD AUTO: 4.68 X10(3) UL (ref 1.5–7.7)
NEUTROPHILS NFR BLD AUTO: 56.5 %
OSMOLALITY SERPL CALC.SUM OF ELEC: 300 MOSM/KG (ref 275–295)
PLATELET # BLD AUTO: 209 10(3)UL (ref 150–450)
POTASSIUM SERPL-SCNC: 4 MMOL/L (ref 3.5–5.1)
PROT SERPL-MCNC: 6.2 G/DL (ref 5.7–8.2)
RBC # BLD AUTO: 3.42 X10(6)UL
SODIUM SERPL-SCNC: 142 MMOL/L (ref 136–145)
WBC # BLD AUTO: 8.3 X10(3) UL (ref 4–11)

## 2025-01-14 PROCEDURE — 99233 SBSQ HOSP IP/OBS HIGH 50: CPT | Performed by: INTERNAL MEDICINE

## 2025-01-14 RX ORDER — LOSARTAN POTASSIUM 50 MG/1
50 TABLET ORAL ONCE
Status: COMPLETED | OUTPATIENT
Start: 2025-01-14 | End: 2025-01-14

## 2025-01-14 RX ORDER — MAGNESIUM OXIDE 400 MG/1
400 TABLET ORAL ONCE
Status: COMPLETED | OUTPATIENT
Start: 2025-01-14 | End: 2025-01-14

## 2025-01-14 RX ORDER — LOSARTAN POTASSIUM 100 MG/1
100 TABLET ORAL DAILY
Status: DISCONTINUED | OUTPATIENT
Start: 2025-01-15 | End: 2025-01-16

## 2025-01-14 NOTE — PROGRESS NOTES
Heart Failure Nurse  Progress Note    Patient was evaluated by the Heart Failure Nurse  for understanding, verbalization, demonstration, and recall of education related to heart failure, overall adherence to the behaviors necessary to maintain a compensated status, and risk for readmission.     Patient assessment:Patient up to chair and daughter at bedside. Daughter reports that mealtime is difficult for them. She helps provide them with meals several times per week and it can be difficult as she is a full time worker herself and she has a family. Convenience meals for her parents are few and far between.  Meals on wheels was suggested however the say in which it is set up she would be buying food they might not eat and that would be wasteful for her and them. Meal prepping was also discussed. The recommendations of the AHA were discussed and patient is already implementing these steps. Daughter was able to call MD prior to this admission and there was an intervention that came from his PCP. Instructed she call cardiology next time there is the change in weight or he has S&S of CHF exacerbation. Daughter verbalized understanding. Will consult  dietician to recommend easy low sodium go to meals for the daughter.     Patient is able to verbalize signs/symptoms fluid overload/impending HF exacerbation and who to contact with problems                                          __x_ yes  ___ no      Patient is following a 2000 mg sodium diet                                             _x__ yes  ___ no    If no, barriers to 2000 mg sodium diet:_______________________________________________    Patient informed of 2-Part dietician classes that is free if sign up within 30 days of discharge or $40  ___ yes  __x_ no      Patient is adherent to medication regimen                                              __x_ yes  ___ no    If no, barriers to medication regimen:    Patient has sufficient funds to purchase  medication                      __x_ yes  ___ no      Patient has a scale in the home              __x_ yes  ___ no      Patient is adherent to daily weight monitoring                                        __x_ yes   ___ no    If no, barriers to daily weight monitoring:    Symptom Tracker Worksheet reviewed with patient  ___ yes   __x_ no      Patient verbalizes understanding of stoplight/heart failure zones          __x_ yes   ___ no      Patient understands the importance of 7-day follow-up appointment      _x__ yes  ___ no    Appointment Date: 1/23/25 @ 3pm with Milka Grewal.          Patient has adequate transportation to attend follow-up appointments    __x_ yes  ___ no    If no, was referral to Social Work made  ___yes  ___ no      Family/Friend present during education:   daughter  Additional consultations required: none    Blanca Jackson RN HF  [phone]

## 2025-01-14 NOTE — PLAN OF CARE
Patient is from home w/ family. A&Ox4. Room air. On tele. Patient is x1 assist w/ walker. Bed in lowest position and locked. Call light in hand. Personal belongings w/in reach.     Problem: Patient Centered Care  Goal: Patient preferences are identified and integrated in the patient's plan of care  Description: Interventions:  - What would you like us to know as we care for you? Home w/ Family   - Provide timely, complete, and accurate information to patient/family  - Incorporate patient and family knowledge, values, beliefs, and cultural backgrounds into the planning and delivery of care  - Encourage patient/family to participate in care and decision-making at the level they choose  - Honor patient and family perspectives and choices  Outcome: Progressing     Problem: METABOLIC/FLUID AND ELECTROLYTES - ADULT  Goal: Glucose maintained within prescribed range  Description: INTERVENTIONS:  - Monitor Blood Glucose as ordered  - Assess for signs and symptoms of hyperglycemia and hypoglycemia  - Administer ordered medications to maintain glucose within target range  - Assess barriers to adequate nutritional intake and initiate nutrition consult as needed  - Instruct patient on self management of diabetes  Outcome: Progressing  Goal: Electrolytes maintained within normal limits  Description: INTERVENTIONS:  - Monitor labs and rhythm and assess patient for signs and symptoms of electrolyte imbalances  - Administer electrolyte replacement as ordered  - Monitor response to electrolyte replacements, including rhythm and repeat lab results as appropriate  - Fluid restriction as ordered  - Instruct patient on fluid and nutrition restrictions as appropriate  Outcome: Progressing     Problem: SKIN/TISSUE INTEGRITY - ADULT  Goal: Skin integrity remains intact  Description: INTERVENTIONS  - Assess and document risk factors for pressure ulcer development  - Assess and document skin integrity  - Monitor for areas of redness and/or  skin breakdown  - Initiate interventions, skin care algorithm/standards of care as needed  Outcome: Progressing     Problem: CARDIOVASCULAR - ADULT  Goal: Maintains optimal cardiac output and hemodynamic stability  Description: INTERVENTIONS:  - Monitor vital signs, rhythm, and trends  - Monitor for bleeding, hypotension and signs of decreased cardiac output  - Evaluate effectiveness of vasoactive medications to optimize hemodynamic stability  - Monitor arterial and/or venous puncture sites for bleeding and/or hematoma  - Assess quality of pulses, skin color and temperature  - Assess for signs of decreased coronary artery perfusion - ex. Angina  - Evaluate fluid balance, assess for edema, trend weights  Outcome: Progressing  Goal: Absence of cardiac arrhythmias or at baseline  Description: INTERVENTIONS:  - Continuous cardiac monitoring, monitor vital signs, obtain 12 lead EKG if indicated  - Evaluate effectiveness of antiarrhythmic and heart rate control medications as ordered  - Initiate emergency measures for life threatening arrhythmias  - Monitor electrolytes and administer replacement therapy as ordered  Outcome: Progressing     Problem: Patient/Family Goals  Goal: Patient/Family Long Term Goal  Description: Patient's Long Term Goal: Im from home     Interventions:  - See additional Care Plan goals for specific interventions  Outcome: Progressing  Goal: Patient/Family Short Term Goal  Description: Patient's Short Term Goal: Go home     Interventions:   - See additional Care Plan goals for specific interventions  Outcome: Progressing

## 2025-01-14 NOTE — PLAN OF CARE
Problem: Patient Centered Care  Goal: Patient preferences are identified and integrated in the patient's plan of care  Description: Interventions:  - What would you like us to know as we care for you? Home w/ Family   - Provide timely, complete, and accurate information to patient/family  - Incorporate patient and family knowledge, values, beliefs, and cultural backgrounds into the planning and delivery of care  - Encourage patient/family to participate in care and decision-making at the level they choose  - Honor patient and family perspectives and choices  Outcome: Progressing     Problem: METABOLIC/FLUID AND ELECTROLYTES - ADULT  Goal: Glucose maintained within prescribed range  Description: INTERVENTIONS:  - Monitor Blood Glucose as ordered  - Assess for signs and symptoms of hyperglycemia and hypoglycemia  - Administer ordered medications to maintain glucose within target range  - Assess barriers to adequate nutritional intake and initiate nutrition consult as needed  - Instruct patient on self management of diabetes  Outcome: Progressing  Goal: Electrolytes maintained within normal limits  Description: INTERVENTIONS:  - Monitor labs and rhythm and assess patient for signs and symptoms of electrolyte imbalances  - Administer electrolyte replacement as ordered  - Monitor response to electrolyte replacements, including rhythm and repeat lab results as appropriate  - Fluid restriction as ordered  - Instruct patient on fluid and nutrition restrictions as appropriate  Outcome: Progressing     Problem: SKIN/TISSUE INTEGRITY - ADULT  Goal: Skin integrity remains intact  Description: INTERVENTIONS  - Assess and document risk factors for pressure ulcer development  - Assess and document skin integrity  - Monitor for areas of redness and/or skin breakdown  - Initiate interventions, skin care algorithm/standards of care as needed  Outcome: Progressing     Problem: CARDIOVASCULAR - ADULT  Goal: Maintains optimal cardiac  output and hemodynamic stability  Description: INTERVENTIONS:  - Monitor vital signs, rhythm, and trends  - Monitor for bleeding, hypotension and signs of decreased cardiac output  - Evaluate effectiveness of vasoactive medications to optimize hemodynamic stability  - Monitor arterial and/or venous puncture sites for bleeding and/or hematoma  - Assess quality of pulses, skin color and temperature  - Assess for signs of decreased coronary artery perfusion - ex. Angina  - Evaluate fluid balance, assess for edema, trend weights  Outcome: Progressing  Goal: Absence of cardiac arrhythmias or at baseline  Description: INTERVENTIONS:  - Continuous cardiac monitoring, monitor vital signs, obtain 12 lead EKG if indicated  - Evaluate effectiveness of antiarrhythmic and heart rate control medications as ordered  - Initiate emergency measures for life threatening arrhythmias  - Monitor electrolytes and administer replacement therapy as ordered  Outcome: Progressing     Problem: Patient/Family Goals  Goal: Patient/Family Long Term Goal  Description: Patient's Long Term Goal: Im from home     Interventions:  - See additional Care Plan goals for specific interventions  Outcome: Progressing  Goal: Patient/Family Short Term Goal  Description: Patient's Short Term Goal: Go home     Interventions:   - See additional Care Plan goals for specific interventions  Outcome: Progressing

## 2025-01-14 NOTE — PROGRESS NOTES
Progress Note  Mohamud Fitzpatrick Patient Status:  Inpatient    1935 MRN [id number]   Location [facility] 3W/SW Attending Sariah Solomon MD   Hosp Day # 2 PCP Priyank Rodriguez MD     Subjective:  Sitting in chair, eating, daughter at bedside    Objective:  /63 (BP Location: Right arm)   Pulse 65   Temp 97.2 °F (36.2 °C) (Temporal)   Resp 18   Wt 108 lb 12.8 oz (49.4 kg)   SpO2 94%   BMI 22.74 kg/m²     Telemetry: SR/SB    Intake/Output:    Intake/Output Summary (Last 24 hours) at 2025 1337  Last data filed at 2025 1319  Gross per 24 hour   Intake 660 ml   Output 2585 ml   Net -1925 ml     Last 3 Weights   25 0259 108 lb 12.8 oz (49.4 kg)   25 0402 108 lb 14.4 oz (49.4 kg)   25 1539 113 lb 3.2 oz (51.3 kg)   25 1425 114 lb (51.7 kg)   24 0843 109 lb 12.8 oz (49.8 kg)     Labs:  Recent Labs   Lab 25  1246 25  0750 25  0730   * 95 133*   BUN 28* 24* 23   CREATSERUM 1.08 1.05 1.01   EGFRCR 66 69 72   CA 9.5 9.5 9.4    141 142   K 4.5 4.2 4.0    109 107   CO2 22.0 24.0 25.0     Recent Labs   Lab 25  1246 25  0750 25  0730   RBC 3.23* 3.22* 3.42*   HGB 8.5* 8.4* 9.4*   HCT 27.4* 26.8* 28.2*   MCV 84.8 83.2 82.5   MCH 26.3 26.1 27.5   MCHC 31.0 31.3 33.3   RDW 15.0 15.0 15.1*   NEPRELIM 4.56 3.71 4.68   WBC 7.0 6.6 8.3   .0 195.0 209.0     Recent Labs   Lab 25  1246   TROPHS 32     Lab Results   Component Value Date/Time    HDL 37 (L) 2024 06:33 AM    LDL 40 2024 06:33 AM    TRIG 78 2024 06:33 AM     No results found for: \"DDIMER\"  Lab Results   Component Value Date    TSH 1.193 2024     Review of Systems:   Constitutional: No fevers, chills, fatigue or night sweats.  Respiratory: Denies cough, wheezing or shortness of breath.  CV: Denies chest pain, palpitations, orthopnea, PND or dizziness.  GI: No nausea, vomiting or diarrhea. No blood in stools.    Physical  Exam:   General: Alert, cooperative, no distress, appears stated age.  Neck: no JVD.  Lungs: diminished bilaterally.  Chest wall: No tenderness or deformity.  Heart: Regular rate and rhythm, S1, S2 normal, + systolic murmur, no click, rub or gallop.  Abdomen: Soft, non-tender. Bowel sounds normal. No masses,  No organomegaly.  Extremities: Extremities normal, atraumatic, no cyanosis, mild BLE edema.  Pulses: 2+ and symmetric all extremities.  Neurologic: Grossly intact.    Medications:   furosemide  40 mg Intravenous BID (Diuretic)    losartan  50 mg Oral Daily    metoprolol tartrate  25 mg Oral 2x Daily(Beta Blocker)    insulin aspart  1-11 Units Subcutaneous TID CC    heparin  5,000 Units Subcutaneous Q8H GIOVANNI    allopurinol  300 mg Oral Daily    atorvastatin  40 mg Oral Nightly    clopidogrel  75 mg Oral Daily    miconazole   Topical BID     Assessment:    89 year old male with PMH of CAD s/p CABG, HFpEF, DMII, HTN, chronic anemia who presented with significant LE edema and testicular swelling. His proBNP was elevated at 15,464.     Acute on chronic HFpEF  Admitted with volume overload  -LVEF 50-55% on TTE this admission, last echo in January, 2024 EF 59%, recent stress PET showed resting EF 35% with stress EF of 51% with frequent ectopy during the study which was attributed as the cause of the discrepancy  -baseline weight around 100-104# per daughter, 113# on admission  -diuresing with IV lasix BID with good response  -I/Os net neg 3.5L, 2.7L UOP past 24 hours, weight down 5#  -exacerbation likely due to dietary nonadherence, frequently eating takeout and [employer] soups  -CXR revealed mild interstitial and alveolar edema, still volume up on exam with significant edema  -proBNP on admission 15,464  -GDMT: lasix, losartan, lopressor    CAD s/p remote CABG  Denies anginal symptoms   -statin, plavix, ARB, BB    HTN  BP remains elevated in 150-160s  -losartan, lopressor, will increase doses    HLD-statin, LDL  controlled 40    DMII-A1c 6.2    Chronic anemia  Hgb stable 8-9      Plan:  -Increase losartan to 100mg daily, give one time dose of 50mg now  -Continue lopressor 25mg BID  -Continue IV lasix BID, monitor strict I/Os, daily weights, daily BMP, possible transition to PO tomorrow   -continue statin, plavix      Plan of care discussed with patient, RN.        Liz Espino, APRN  1/14/2025  1:37 PM  [phone] [place]  [phone] roberto

## 2025-01-14 NOTE — PAYOR COMM NOTE
--------------  ADMISSION REVIEW     Payor: BELKIS MEYERS [facility]  Subscriber #:  [id number]  Authorization Number: [id number]    Admit date: 1/12/25  Admit time:  3:33 PM       ED Provider Notes        History   HPI  Patient has  history of CHF  complains of progressive swelling in legs now into scrotum.  no cough.  no chest pain.   no fever or chills.  Sig leg  swelling.  + orthopnea.   .  Symptoms worse with activity.    Symptoms improved with nothing.     ED Triage Vitals [01/12/25 1158]   BP 98/62   Pulse (!) 43   Resp 18   Temp 97.9 °F (36.6 °C)   Temp src Oral   SpO2 96 %   O2 Device None (Room air)     HEAD: normocephalic, atraumatic,   EYES: PERRLA, EOMI,  THROAT: mm dry, no lesions  NECK: supple, no meningeal signs  LUNGS:dec at bases  CARDIO: irregular  GI: abdomen is soft and non tender, no masses, nl bowel sounds   EXTREMITIES: from, 5/5 strength in all 4 ext,2+ edema through thighs into scrotum  NEURO: alert and oiented *3, 2-12 intact, no focal deficit noted  SKIN: good skin turgor, no  rashes  PSYCH: calm, cooperative,    Labs Reviewed   BASIC METABOLIC PANEL (8) - Abnormal; Notable for the following components:       Result Value    Glucose 192 (*)     BUN 28 (*)     BUN/CREA Ratio 25.9 (*)     Calculated Osmolality 303 (*)     All other components within normal limits   CBC WITH DIFFERENTIAL WITH PLATELET - Abnormal; Notable for the following components:    RBC 3.23 (*)     HGB 8.5 (*)     HCT 27.4 (*)     RDW-SD 46.7 (*)     Lymphocyte Absolute 0.74 (*)     Eosinophil Absolute 1.08 (*)     All other components within normal limits   PRO BETA NATRIURETIC PEPTIDE - Abnormal; Notable for the following components:    Pro-Beta Natriuretic Peptide 15,464 (*)      EKG    Rate, intervals and axes as noted on EKG Report.  Rate: 67  Rhythm: Sinus Rhythm  Reading: sr with first degreee av block with lvh bifasicular block    XR CHEST AP PORTABLE  (CPT=71045)  Result Date: 1/12/2025  CONCLUSION:   Mild cardiomegaly with  interstitial and alveolar pulmonary edema.  Other superimposed alveolar process not excluded in the appropriate clinical setting.  3.2 cm nodular opacity in the right perihilar region, favored to relate to vascular congestion in this setting.  Recommend radiographic follow-up to assess resolution and exclude underlying pulmonary mass.        Dictated by (CST): Kristal Rose MD on 1/12/2025 at 3:45 PM     Finalized by (CST): Kristal Rose MD on 1/12/2025 at 3:47 PM           Disposition and Plan     Clinical Impression:  1. Congestive heart failure, unspecified HF chronicity, unspecified heart failure type (HCC)      Disposition:  Admit       History and Physical    History of Present Illness: Mohamud Fitzpatrick is a 89 year old male with a past medical history of CAD  He denied any chest pain, n/v/d or any other complaints. He denied any recent travel or sick contacts.   He has been having a lot of panera soups - family stated they were unaware of the salt content.    /86 (BP Location: Right arm)   Pulse 64   Temp 97.9 °F (36.6 °C) (Oral)   Resp 20   Wt 113 lb 3.2 oz (51.3 kg)   SpO2 95%   BMI 23.66 kg/m²   General:  Alert and oriented x 3.  Respiratory: Clear to auscultation bilaterally. No wheezes. No rhonchi.  Cardiovascular: S1, S2. Regular rate and rhythm. No murmurs, no rubs or gallops. Equal pulses.   Abdomen: Soft, nontender, nondistended.  Positive bowel sounds. No rebound, guarding or organomegaly.  Neurologic: No focal neurological deficits. CNII-XII grossly intact.  Extremities: 2+ edema in bilateral LE.      Lab 01/12/25  1246   WBC 7.0   HGB 8.5*   MCV 84.8   .0      Lab 01/12/25  1246   *   BUN 28*   CREATSERUM 1.08   CA 9.5      K 4.5      CO2 22.0       ASSESSMENT / PLAN:      HFpEF, acute on chronic  Hx of CAD s/p CABG  CXR reviewed  BNP elevated  Trop and EKG pending  Started on Lasix IV BID  Cardiology on consult  Appreciate recs  T2DM  SSI and  frequent accuchecks  HTN  BP well controlled  Continue home meds as indicated  Monitor vitals  HLD  Continue statin               1/13/25     Temp:  [97.9 °F (36.6 °C)-98.3 °F (36.8 °C)] 98.3 °F (36.8 °C)  Pulse:  [53-74] 62  Resp:  [13-20] 16  BP: (126-148)/(47-86) 147/59  SpO2:  [92 %-96 %] 92 %     General: Alert and oriented x 3.  Respiratory: Clear to auscultation bilaterally. No wheezes. No rhonchi.  Cardiovascular: S1, S2. Regular rate and rhythm. No murmurs, no rubs or gallops. Equal pulses.   Abdomen: Soft, nontender, nondistended.  Positive bowel sounds. No rebound, guarding or organomegaly.  Neurologic: No focal neurological deficits. CNII-XII grossly intact.  Extremities: 2+ edema in bilateral LE.      Lab 01/12/25  1246 01/13/25  0750   WBC 7.0 6.6   HGB 8.5* 8.4*   MCV 84.8 83.2   .0 195.0      Lab 01/12/25  1246 01/13/25  0750   * 95   BUN 28* 24*   CREATSERUM 1.08 1.05   CA 9.5 9.5   ALB  --  3.8    141   K 4.5 4.2    109   CO2 22.0 24.0   ALKPHO  --  80   AST  --  <8   ALT  --  11   BILT  --  0.4   TP  --  6.0      Medications:    furosemide  40 mg Intravenous BID (Diuretic)    losartan  50 mg Oral Daily    metoprolol tartrate  25 mg Oral 2x Daily(Beta Blocker)    insulin aspart  1-11 Units Subcutaneous TID CC    heparin  5,000 Units Subcutaneous Q8H GIOVANNI    allopurinol  300 mg Oral Daily    atorvastatin  40 mg Oral Nightly    clopidogrel  75 mg Oral Daily    miconazole   Topical BID       Assessment & Plan:      HFpEF, acute on chronic  Hx of CAD s/p CABG  CXR reviewed  BNP elevated  Trop and EKG pending  Started on Lasix IV BID  Cardiology on consult  Continue lasix 40 mg IV BID  Resume losartan  Start lopressor  Echo with EF 55-60%  T2DM  A1c 6.2%  SSI and frequent accuchecks  HTN  BP well controlled  Continue home meds as indicated  Monitor vitals  HLD  Continue statin     Quality:  DVT Prophylaxis: Heparin s/c                  CARDIOLOGY  Assessment:     Acute on  chronic HFpEF , EF 59% in 2024  - presented w/ volume increased BLE & testicular swelling after recent after dietary non adherence for the last few weeks   - pBNP 15,464 & cxr w/ mild pulm edema on admit   - on lasix 20mg ivp bid. Net neg 1.1L over 24h. Scr stable   - 2 gm Na diet , 2L fluid restriction   - monitor strict I/o, daily wts & renal fx closely   - GMDT : historically on toprol xl 150mg daily & losartan 50 mg daily per last OV   - repeat echocardiogram pending      CAD , s/p remote CABG   - denies anginal sx. Trop neg x 2 . EKG pending . Echo as above   - on plavix, atorvastatin      HTN   - above goal. historically on amlodipine (10mg daily), toprol xl & losartan      HLD   - last ldl 40, on atorvastatin      T2DM   - hgA1C = 6.2 %, on insulin per PMD      Chronic anemia   - hgb stable      Plan:     Good uop . Net neg 1.1L over 24h. Scr stable . Lasix 40mg ivp bid    GDMT: resume losartan 50mg daily. SR w/ bigeminy & transient nsvt noted. Start lopressor 25mg bid. Optimize gdmt as bp & renal fx permits  Replenish electrolytes per cardiac protocol as needed. Keep K > 4, Mg > 2   Monitor strict I/o, daily wts & renal fx closely   Echo pending           1/14/25     Temp:  [97.2 °F (36.2 °C)-98.1 °F (36.7 °C)] 97.2 °F (36.2 °C)  Pulse:  [57-72] 65  Resp:  [16-18] 18  BP: (150-165)/(55-80) 153/63  SpO2:  [94 %-95 %] 94 %     Physical Exam:    General:  Alert and oriented x 3.  Respiratory: Clear to auscultation bilaterally. No wheezes. No rhonchi.  Cardiovascular: S1, S2. Regular rate and rhythm. No murmurs, no rubs or gallops. Equal pulses.   Abdomen: Soft, nontender, nondistended.  Positive bowel sounds. No rebound, guarding or organomegaly.  Neurologic: No focal neurological deficits. CNII-XII grossly intact.  Extremities: 2+ edema in bilateral LE.      Lab 01/12/25  1246 01/13/25  0750 01/14/25  0730   WBC 7.0 6.6 8.3   HGB 8.5* 8.4* 9.4*   MCV 84.8 83.2 82.5   .0 195.0 209.0     Lab  01/12/25  1246 01/13/25  0750 01/14/25  0730   * 95 133*   BUN 28* 24* 23   CREATSERUM 1.08 1.05 1.01   CA 9.5 9.5 9.4   ALB  --  3.8 3.7    141 142   K 4.5 4.2 4.0    109 107   CO2 22.0 24.0 25.0   ALKPHO  --  80 83   AST  --  <8 <8   ALT  --  11 16   BILT  --  0.4 0.4   TP  --  6.0 6.2       Medications:    furosemide  40 mg Intravenous BID (Diuretic)    losartan  50 mg Oral Daily    metoprolol tartrate  25 mg Oral 2x Daily(Beta Blocker)    insulin aspart  1-11 Units Subcutaneous TID CC    heparin  5,000 Units Subcutaneous Q8H GIOVANNI    allopurinol  300 mg Oral Daily    atorvastatin  40 mg Oral Nightly    clopidogrel  75 mg Oral Daily    miconazole   Topical BID       Assessment & Plan:      HFpEF, acute on chronic  Hx of CAD s/p CABG  CXR reviewed  BNP elevated  Trop and EKG pending  Started on Lasix IV BID  Cardiology on consult  Continue lasix 40 mg IV BID  Resume losartan  Start lopressor  Echo with EF 55-60%  Management per cardiology  Net IO Since Admission: -2,975 mL [01/14/25 1134]  T2DM  A1c 6.2%  SSI and frequent accuchecks  HTN  BP well controlled  Continue home meds as indicated  Monitor vitals  HLD  Continue statin                      MEDICATIONS ADMINISTERED IN LAST 1 DAY:  allopurinol (Zyloprim) tab 300 mg       Date Action Dose Route User    1/14/2025 0800 Given 300 mg Oral Magi Jacome RN          atorvastatin (Lipitor) tab 40 mg       Date Action Dose Route User    1/13/2025 2004 Given 40 mg Oral Cyndi Jonas RN          clopidogrel (Plavix) tab 75 mg       Date Action Dose Route User    1/14/2025 0800 Given 75 mg Oral Magi Jacome RN          diphenhydrAMINE-zinc (Benadryl-Zinc) 2-0.1 % cream 1 Application       Date Action Dose Route User    1/13/2025 1645 Given 1 Application Topical Va Jonas RN          furosemide (Lasix) 10 mg/mL injection 40 mg       Date Action Dose Route User    1/14/2025 0800 Given 40 mg Intravenous Magi Jacome RN    1/13/2025 2992  Given 40 mg Intravenous Va Jonas RN          heparin (Porcine) 5000 UNIT/ML injection 5,000 Units       Date Action Dose Route User    1/14/2025 0404 Given 5,000 Units Subcutaneous (Right Lower Abdomen) Cyndi Jonas RN    1/13/2025 2004 Given 5,000 Units Subcutaneous (Right Lower Abdomen) Cyndi Jonas RN    1/13/2025 1313 Given 5,000 Units Subcutaneous (Left Lower Abdomen) Va Jonas RN          insulin aspart (NovoLOG) 100 Units/mL FlexPen 1-11 Units       Date Action Dose Route User    1/13/2025 1745 Given 2 Units Subcutaneous (Right Lower Abdomen) Va Jonas RN    1/13/2025 1310 Given 5 Units Subcutaneous (Right Lower Arm) aV Jonas RN          losartan (Cozaar) tab 50 mg       Date Action Dose Route User    1/14/2025 0800 Given 50 mg Oral Magi Jacome RN          magnesium oxide (Mag-Ox) tab 400 mg       Date Action Dose Route User    1/14/2025 1022 Given 400 mg Oral Magi Jacome RN          metoprolol tartrate (Lopressor) tab 25 mg       Date Action Dose Route User    1/14/2025 0404 Given 25 mg Oral Cyndi Jonas RN          miconazole 2 % powder       Date Action Dose Route User    1/14/2025 0805 Given (none) Topical Magi Jacome RN    1/13/2025 2006 Given (none) Topical Cyndi Jonas RN            Vitals (last day)       Date/Time Temp Pulse Resp BP SpO2 Weight O2 Device O2 Flow Rate (L/min) [facility]    01/14/25 0725 97.2 °F (36.2 °C) 63 18 165/66 94 % -- None (Room air) --     01/14/25 0359 98.1 °F (36.7 °C) 72 17 155/80 94 % -- None (Room air) --     01/14/25 0259 -- -- -- -- -- 108 lb 12.8 oz (49.4 kg) -- -- LR    01/13/25 2002 97.8 °F (36.6 °C) 57 17 150/64 95 % -- None (Room air) --     01/13/25 1643 97.7 °F (36.5 °C) 60 16 153/55 94 % -- None (Room air) -- MS    01/13/25 1035 98.3 °F (36.8 °C) 62 16 147/59 92 % -- None (Room air) -- MS    01/13/25 0402 97.9 °F (36.6 °C) 74 16 148/64 -- -- None (Room air) -- AP

## 2025-01-14 NOTE — CM/SW NOTE
01/14/25 1200   CM/SW Referral Data   Referral Source Social Work (self-referral)   Reason for Referral Discharge planning   Informant Patient   Medical Hx   Does patient have an established PCP? Yes  (Priyank Luisjoey)   Patient Info   Patient's Current Mental Status at Time of Assessment Alert;Oriented   Patient's Home Environment Condo/Apt with elevator   Patient lives with Spouse/Significant other   Patient Status Prior to Admission   Independent with ADLs and Mobility Yes   Discharge Needs   Anticipated D/C needs Home health care   Choice of Post-Acute Provider   Informed patient of right to choose their preferred provider Yes   List of appropriate post-acute services provided to patient/family with quality data No - Declined list   Patient/family choice Wants Outpatient PT   Patient declines recommended services Yes  (declining HH)       SW self referred pt for DC Planning after chart review showed Anticipated therapy need: Home with Home Healthcare  HH referrals sent in Aidin. F2F entered/sent.    Met w/ pt at bedside. Above assessment completed.  Verified home address and confirmed pt lives w/ his wife.    Pt confirmed their apt/condo has an elevator. Pt informed SW that he typically ambulates independently but will now need a walker. Pt is requesting a walker at DC.    Pt confirmed hx w/ HH post surgeries. SW discussed anticipated therapy need for HH and offered list of quality data. Pt is declining HH.    Pt is requesting Outpatient PT upon DC. He confirmed he will drive himself or have his dtr drive him.    MD and MARCELLE Sow notified of request for Outpatient PT Rx/order and walker prior to DC.    PLAN: Home w/ Outpatient PT - pending Rx & med clear      SW/CM to remain available for support and/or discharge planning.       Ada, MSW, LSW [id number]

## 2025-01-14 NOTE — PROGRESS NOTES
[facility]  [facility] Progress Note     Mohamud Fitzpatrick Patient Status:  Inpatient    1935 MRN [id number]   Location [facility] 3W/SW Attending Sariah Solomon MD   Hosp Day # 2 PCP Priyank Rodriguez MD     Subjective:     Patient laying in bed and in NAD. Denied any active complaints at the time of interview.   All questions and concerns addressed.  No overnight events reported by the nursing staff.     Objective:    Review of Systems:   ROS completed; pertinent positive and negatives stated in subjective.      Vital signs:  Temp:  [97.2 °F (36.2 °C)-98.1 °F (36.7 °C)] 97.2 °F (36.2 °C)  Pulse:  [57-72] 65  Resp:  [16-18] 18  BP: (150-165)/(55-80) 153/63  SpO2:  [94 %-95 %] 94 %      Physical Exam:    General: No acute distress. Alert and oriented x 3.  Respiratory: Clear to auscultation bilaterally. No wheezes. No rhonchi.  Cardiovascular: S1, S2. Regular rate and rhythm. No murmurs, no rubs or gallops. Equal pulses.   Abdomen: Soft, nontender, nondistended.  Positive bowel sounds. No rebound, guarding or organomegaly.  Neurologic: No focal neurological deficits. CNII-XII grossly intact.  Extremities: 2+ edema in bilateral LE.       Diagnostic Data:    Labs:  Recent Labs   Lab 25  1246 25  0750 25  0730   WBC 7.0 6.6 8.3   HGB 8.5* 8.4* 9.4*   MCV 84.8 83.2 82.5   .0 195.0 209.0       Recent Labs   Lab 25  1246 25  0750 25  0730   * 95 133*   BUN 28* 24* 23   CREATSERUM 1.08 1.05 1.01   CA 9.5 9.5 9.4   ALB  --  3.8 3.7    141 142   K 4.5 4.2 4.0    109 107   CO2 22.0 24.0 25.0   ALKPHO  --  80 83   AST  --  <8 <8   ALT  --  11 16   BILT  --  0.4 0.4   TP  --  6.0 6.2       Estimated Creatinine Clearance: 36 mL/min (based on SCr of 1.01 mg/dL).    No results for input(s): \"PTP\", \"INR\" in the last 168 hours.           Imaging: Imaging data reviewed in Epic.    Medications:    furosemide  40  mg Intravenous BID (Diuretic)    losartan  50 mg Oral Daily    metoprolol tartrate  25 mg Oral 2x Daily(Beta Blocker)    insulin aspart  1-11 Units Subcutaneous TID CC    heparin  5,000 Units Subcutaneous Q8H GIOVANNI    allopurinol  300 mg Oral Daily    atorvastatin  40 mg Oral Nightly    clopidogrel  75 mg Oral Daily    miconazole   Topical BID       Assessment & Plan:     HFpEF, acute on chronic  Hx of CAD s/p CABG  CXR reviewed  BNP elevated  Trop and EKG pending  Started on Lasix IV BID  Cardiology on consult  Continue lasix 40 mg IV BID  Resume losartan  Start lopressor  Echo with EF 55-60%  Management per cardiology  Net IO Since Admission: -2,975 mL [01/14/25 1134]  T2DM  A1c 6.2%  SSI and frequent accuchecks  HTN  BP well controlled  Continue home meds as indicated  Monitor vitals  HLD  Continue statin      Plan of care discussed with patient or family at bedside.      Supplementary Documentation:     Quality:  DVT Prophylaxis: Heparin s/c  CODE status: Full      Estimated date of discharge: TBD  Discharge is dependent on: clinical stability  At this point Mr. Fitzpatrick is expected to be discharge to: home             **Certification      PHYSICIAN Certification of Need for Inpatient Hospitalization - Initial Certification    Patient will require inpatient services that will reasonably be expected to span two midnight's based on the clinical documentation in H+P.   Based on patients current state of illness, I anticipate that, after discharge, patient will require TBD.      Sariah Solomon MD  Hospitalist    MDM: High, I personally reviewed the available laboratories, imaging including XR. I discussed the case with RN. I ordered laboratories, studies including AM labs.  Medical decision making high, risk is high.       The 21st Century Cures Act makes medical notes like these available to patients in the interest of transparency. Please be advised this is a medical document. Medical documents are intended to carry  relevant information, facts as evident, and the clinical opinion of the practitioner. The medical note is intended as peer to peer communication and may appear blunt or direct. It is written in medical language and may contain abbreviations or verbiage that are unfamiliar.

## 2025-01-15 LAB
ALBUMIN SERPL-MCNC: 4.1 G/DL (ref 3.2–4.8)
ALBUMIN/GLOB SERPL: 1.6 {RATIO} (ref 1–2)
ALP LIVER SERPL-CCNC: 94 U/L
ALT SERPL-CCNC: 19 U/L
ANION GAP SERPL CALC-SCNC: 8 MMOL/L (ref 0–18)
AST SERPL-CCNC: 12 U/L (ref ?–34)
BASOPHILS # BLD AUTO: 0.09 X10(3) UL (ref 0–0.2)
BASOPHILS NFR BLD AUTO: 0.9 %
BILIRUB SERPL-MCNC: 0.5 MG/DL (ref 0.2–1.1)
BUN BLD-MCNC: 20 MG/DL (ref 9–23)
BUN/CREAT SERPL: 20.2 (ref 10–20)
CALCIUM BLD-MCNC: 10 MG/DL (ref 8.7–10.4)
CHLORIDE SERPL-SCNC: 102 MMOL/L (ref 98–112)
CO2 SERPL-SCNC: 29 MMOL/L (ref 21–32)
CREAT BLD-MCNC: 0.99 MG/DL
DEPRECATED RDW RBC AUTO: 43.7 FL (ref 35.1–46.3)
EGFRCR SERPLBLD CKD-EPI 2021: 74 ML/MIN/1.73M2 (ref 60–?)
EOSINOPHIL # BLD AUTO: 1.85 X10(3) UL (ref 0–0.7)
EOSINOPHIL NFR BLD AUTO: 18.1 %
ERYTHROCYTE [DISTWIDTH] IN BLOOD BY AUTOMATED COUNT: 15 % (ref 11–15)
GLOBULIN PLAS-MCNC: 2.6 G/DL (ref 2–3.5)
GLUCOSE BLD-MCNC: 142 MG/DL (ref 70–99)
GLUCOSE BLDC GLUCOMTR-MCNC: 167 MG/DL (ref 70–99)
GLUCOSE BLDC GLUCOMTR-MCNC: 218 MG/DL (ref 70–99)
GLUCOSE BLDC GLUCOMTR-MCNC: 236 MG/DL (ref 70–99)
GLUCOSE BLDC GLUCOMTR-MCNC: 247 MG/DL (ref 70–99)
HCT VFR BLD AUTO: 31.2 %
HGB BLD-MCNC: 10.1 G/DL
IMM GRANULOCYTES # BLD AUTO: 0.02 X10(3) UL (ref 0–1)
IMM GRANULOCYTES NFR BLD: 0.2 %
LYMPHOCYTES # BLD AUTO: 1.28 X10(3) UL (ref 1–4)
LYMPHOCYTES NFR BLD AUTO: 12.5 %
MAGNESIUM SERPL-MCNC: 1.7 MG/DL (ref 1.6–2.6)
MCH RBC QN AUTO: 26.3 PG (ref 26–34)
MCHC RBC AUTO-ENTMCNC: 32.4 G/DL (ref 31–37)
MCV RBC AUTO: 81.3 FL
MONOCYTES # BLD AUTO: 0.92 X10(3) UL (ref 0.1–1)
MONOCYTES NFR BLD AUTO: 9 %
NEUTROPHILS # BLD AUTO: 6.08 X10 (3) UL (ref 1.5–7.7)
NEUTROPHILS # BLD AUTO: 6.08 X10(3) UL (ref 1.5–7.7)
NEUTROPHILS NFR BLD AUTO: 59.3 %
OSMOLALITY SERPL CALC.SUM OF ELEC: 293 MOSM/KG (ref 275–295)
PLATELET # BLD AUTO: 238 10(3)UL (ref 150–450)
POTASSIUM SERPL-SCNC: 4.3 MMOL/L (ref 3.5–5.1)
PROT SERPL-MCNC: 6.7 G/DL (ref 5.7–8.2)
RBC # BLD AUTO: 3.84 X10(6)UL
SODIUM SERPL-SCNC: 139 MMOL/L (ref 136–145)
WBC # BLD AUTO: 10.2 X10(3) UL (ref 4–11)

## 2025-01-15 PROCEDURE — 99233 SBSQ HOSP IP/OBS HIGH 50: CPT | Performed by: HOSPITALIST

## 2025-01-15 RX ORDER — MAGNESIUM OXIDE 400 MG/1
400 TABLET ORAL ONCE
Status: COMPLETED | OUTPATIENT
Start: 2025-01-15 | End: 2025-01-15

## 2025-01-15 RX ORDER — LOSARTAN POTASSIUM 100 MG/1
100 TABLET ORAL DAILY
Qty: 30 TABLET | Refills: 1 | Status: SHIPPED | OUTPATIENT
Start: 2025-01-16

## 2025-01-15 RX ORDER — METOPROLOL TARTRATE 25 MG/1
25 TABLET, FILM COATED ORAL
Qty: 60 TABLET | Refills: 1 | Status: SHIPPED | OUTPATIENT
Start: 2025-01-15

## 2025-01-15 RX ORDER — TRIAMCINOLONE ACETONIDE 1 MG/G
CREAM TOPICAL 2 TIMES DAILY PRN
Status: DISCONTINUED | OUTPATIENT
Start: 2025-01-15 | End: 2025-01-16

## 2025-01-15 RX ORDER — DIPHENHYDRAMINE HCL 25 MG
25 CAPSULE ORAL EVERY 8 HOURS PRN
Status: DISCONTINUED | OUTPATIENT
Start: 2025-01-15 | End: 2025-01-16

## 2025-01-15 RX ORDER — FUROSEMIDE 40 MG/1
40 TABLET ORAL
Status: DISCONTINUED | OUTPATIENT
Start: 2025-01-15 | End: 2025-01-16

## 2025-01-15 RX ORDER — FUROSEMIDE 40 MG/1
40 TABLET ORAL
Qty: 60 TABLET | Refills: 1 | Status: SHIPPED | OUTPATIENT
Start: 2025-01-15

## 2025-01-15 NOTE — PHYSICAL THERAPY NOTE
PHYSICAL THERAPY TREATMENT NOTE - INPATIENT     Room Number: 306/306-A       Presenting Problem: CHF  Co-Morbidities : cad, cabg    Problem List  Principal Problem:    Congestive heart failure, unspecified HF chronicity, unspecified heart failure type (HCC)      PHYSICAL THERAPY ASSESSMENT   Patient demonstrates good  progress this session, goals  progressing with ambulation assistive device an level of assist.    Patient is requiring stand-by assist as a result of the following impairments: medical status.     Patient continues to function near baseline with bed mobility, transfers, gait, stair negotiation, maintaining seated position, standing prolonged periods, and performing household tasks.  Next session anticipate patient to progress bed mobility, transfers, gait, stair negotiation, maintaining seated position, standing prolonged periods, and performing household tasks.  Physical Therapy will continue to follow patient for duration of hospitalization.    Patient continues to benefit from continued skilled PT services: at discharge to promote prior level of function and safety with additional support and return home with home health PT.    PLAN DURING HOSPITALIZATION  Nursing Mobility Recommendation : 1 Assist  PT Device Recommendation: None  PT Treatment Plan: Body mechanics;Coordination;Endurance;Patient education;Family education;Gait training;Strengthening;Balance training;Stair training  Frequency (Obs): 3-5x/week     SUBJECTIVE  \"My thighs are achy but probably because I sat in the chair all day\"    OBJECTIVE  Precautions: Cardiac;Bed/chair alarm    PAIN ASSESSMENT   Ratin  Location:  (dull ache in his legs, attributes it to sitting in chair all day)  Management Techniques: Activity promotion;Body mechanics;Relaxation;Repositioning    BALANCE  Static Sitting: Good  Dynamic Sitting: Good  Static Standing: Good  Dynamic Standing: Good    AM-PAC '6-Clicks' INPATIENT SHORT FORM - BASIC MOBILITY  How much  difficulty does the patient currently have...  Patient Difficulty: Turning over in bed (including adjusting bedclothes, sheets and blankets)?: None   Patient Difficulty: Sitting down on and standing up from a chair with arms (e.g., wheelchair, bedside commode, etc.): None   Patient Difficulty: Moving from lying on back to sitting on the side of the bed?: None   How much help from another person does the patient currently need...   Help from Another: Moving to and from a bed to a chair (including a wheelchair)?: None   Help from Another: Need to walk in hospital room?: None   Help from Another: Climbing 3-5 steps with a railing?: None     AM-PAC Score:  Raw Score: 24   Approx Degree of Impairment: 0%   Standardized Score (AM-PAC Scale): 61.14   CMS Modifier (G-Code): CH    FUNCTIONAL ABILITY STATUS  Functional Mobility/Gait Assessment  Gait Assistance:  Contact guard (progressing to stand by assist)  Distance (ft): 430  Assistive Device: Rolling walker  Pattern:  decreased heel bilateral heel strike, increased UE use on RW  Rolling: not tested  Supine to Sit: not tested  Sit to Supine: not tested  Sit to Stand: stand-by assist    Skilled Therapy Provided: Patient received seated in chair at initiation of session agreeable to participation in PT. Patient tolerates treatment well, reports dull ache in things, but attributes it to sitting in chair all day. Pt able to complete STS with SBA an RW. Pt ambulates 430ft with CGA-SBA and RW at a fast pace. Demonstrates improvement in ambulation tolerance and decreased level of physical assistance for functional mobility in comparison to previous session. Patient educated on gait safety and he verbalizes understanding. Patient left seated in chair, family present, lines intact, needs in reach and handoff to RN.    The patient's Approx Degree of Impairment: 0% has been calculated based on documentation in the [facility] '6 clicks' Inpatient Daily Activity Short Form.  Research  supports that patients with this level of impairment may benefit from home with home health PT.  Final disposition will be made by interdisciplinary medical team.    Patient End of Session: Up in chair;Needs met;Call light within reach;RN aware of session/findings;All patient questions and concerns addressed;Hospital anti-slip socks;Family present    CURRENT GOALS   Goals to be met by: 25  Patient Goal Patient's self-stated goal is: to go home   Goal #1 Patient is able to demonstrate supine - sit EOB @ level: independent     Goal #1   Current Status Not tested   Goal #2 Patient is able to demonstrate transfers Sit to/from Stand at assistance level: modified independent with cane - straight     Goal #2  Current Status Progressing (able to with RW)   Goal #3 Patient is able to ambulate 100 feet with assist device: cane - straight at assistance level: modified independent   Goal #3   Current Status Progressing (430ft with RW)   Goal #4    Goal #4   Current Status    Goal #5 Patient to demonstrate independence with home activity/exercise instructions provided to patient in preparation for discharge.   Goal #5   Current Status Progressing   Goal #6    Goal #6  Current Status      Gait Trainin minutes    Sachin Garcia, SPT    I provided clinical instruction and supervision for the duration of this session and agree with the above documentation.     Aimee Jarrell, PT, DPT

## 2025-01-15 NOTE — PROGRESS NOTES
[facility]  [facility]     Mohamud Fitzpatrick Patient Status:  Inpatient    1935 MRN [id number]   Location [facility] 3W/SW Attending Mauro Paz MD   Hosp Day # 3 PCP Priyank Rodriguez MD     Subjective:     Pt sitting inc hair, NAD  No complaints currently  Swelling and breathing have improved   All questions and concerns addressed.  No overnight events reported by the nursing staff.     Objective:    Review of Systems:   ROS completed; pertinent positive and negatives stated in subjective.      Vital signs:  Temp:  [97.4 °F (36.3 °C)-98.2 °F (36.8 °C)] 97.4 °F (36.3 °C)  Pulse:  [61-76] 75  Resp:  [16-18] 18  BP: (134-156)/(59-87) 146/62  SpO2:  [95 %-97 %] 95 %      Physical Exam:    General: No acute distress. Alert and oriented x 3.  Respiratory: Clear to auscultation bilaterally. No wheezes. No rhonchi.  Cardiovascular: S1, S2. Regular rate and rhythm. No murmurs, no rubs or gallops. Equal pulses.   Abdomen: Soft, nontender, nondistended.  Positive bowel sounds. No rebound, guarding or organomegaly.  Neurologic: No focal neurological deficits. CNII-XII grossly intact.  Extremities: 1+ edema in bilateral LE.       Diagnostic Data:    Labs:  Recent Labs   Lab 25  1246 25  0750 25  0730 01/15/25  0719   WBC 7.0 6.6 8.3 10.2   HGB 8.5* 8.4* 9.4* 10.1*   MCV 84.8 83.2 82.5 81.3   .0 195.0 209.0 238.0       Recent Labs   Lab 25  0750 25  0730 01/15/25  0719   GLU 95 133* 142*   BUN 24* 23 20   CREATSERUM 1.05 1.01 0.99   CA 9.5 9.4 10.0   ALB 3.8 3.7 4.1    142 139   K 4.2 4.0 4.3    107 102   CO2 24.0 25.0 29.0   ALKPHO 80 83 94   AST <8 <8 12   ALT 11 16 19   BILT 0.4 0.4 0.5   TP 6.0 6.2 6.7       Estimated Creatinine Clearance: 34.9 mL/min (based on SCr of 0.99 mg/dL).    No results for input(s): \"PTP\", \"INR\" in the last 168 hours.           Imaging: Imaging data reviewed in  Epic.    Medications:    furosemide  40 mg Oral BID (Diuretic)    losartan  100 mg Oral Daily    metoprolol tartrate  25 mg Oral 2x Daily(Beta Blocker)    insulin aspart  1-11 Units Subcutaneous TID CC    heparin  5,000 Units Subcutaneous Q8H GIOVANNI    allopurinol  300 mg Oral Daily    atorvastatin  40 mg Oral Nightly    clopidogrel  75 mg Oral Daily    miconazole   Topical BID       Assessment & Plan:     HFpEF, acute on chronic  Hx of CAD s/p CABG  CXR reviewed  BNP elevated  Trop and EKG pending  Started on Lasix IV BID -> now on PO 40 mg BID  Cardiology on consult  Resume losartan  Start lopressor  Echo with EF 55-60%  Management per cardiology  Net IO Since Admission: -5,195 mL [01/15/25 1525]  T2DM  A1c 6.2%  SSI and frequent accuchecks  HTN  BP well controlled  Continue home meds as indicated  Monitor vitals  HLD  Continue statin      Plan of care discussed with patient or family at bedside.      Supplementary Documentation:     Quality:  DVT Prophylaxis: Heparin s/c  CODE status: Full      Estimated date of discharge: TBD  Discharge is dependent on: clinical stability  At this point Mr. Fitzpatrick is expected to be discharge to: home    MDM: High

## 2025-01-15 NOTE — DIETARY NOTE
NUTRITION EDUCATION NOTE     Received consult for \"heart failure diet education\" and second consult to provide pt with food prep and low sodium restaurant ideas. RD explained to RN who ordered consult that we do not provide meal plans nor have lists of restaurant recommendations. RD visited pt at bedside, but pt was sleepy soundly. Left Low Sodium Nutrition Therapy handout at bedside. HF  provided education on 1/14 as well. Let RN know that RD is available for further discussion if pt/family requests.     Faustina Steven RD, LDN  Clinical Dietitian  P: [phone]

## 2025-01-15 NOTE — PAYOR COMM NOTE
--------------  CONTINUED STAY REVIEW    Payor: BELKIS MEYERS [facility]  Subscriber #:  [id number]  Authorization Number: [id number]    Admit date: 1/12/25  Admit time:  3:33 PM        1/15/25         Temp:  [97.4 °F (36.3 °C)-98.2 °F (36.8 °C)] 97.4 °F (36.3 °C)  Pulse:  [61-76] 75  Resp:  [16-18] 18  BP: (134-156)/(59-87) 146/62  SpO2:  [95 %-97 %] 95 %        Physical Exam:    General:  Alert and oriented x 3.  Respiratory: Clear to auscultation bilaterally. No wheezes. No rhonchi.  Cardiovascular: S1, S2. Regular rate and rhythm. No murmurs, no rubs or gallops. Equal pulses.   Abdomen: Soft, nontender, nondistended.  Positive bowel sounds. No rebound, guarding or organomegaly.  Neurologic: No focal neurological deficits. CNII-XII grossly intact.  Extremities: 1+ edema in bilateral LE.         Recent Labs   Lab 01/12/25  1246 01/13/25  0750 01/14/25  0730 01/15/25  0719   WBC 7.0 6.6 8.3 10.2   HGB 8.5* 8.4* 9.4* 10.1*   MCV 84.8 83.2 82.5 81.3   .0 195.0 209.0 238.0      Lab 01/13/25  0750 01/14/25  0730 01/15/25  0719   GLU 95 133* 142*   BUN 24* 23 20   CREATSERUM 1.05 1.01 0.99   CA 9.5 9.4 10.0   ALB 3.8 3.7 4.1    142 139   K 4.2 4.0 4.3    107 102   CO2 24.0 25.0 29.0   ALKPHO 80 83 94   AST <8 <8 12   ALT 11 16 19   BILT 0.4 0.4 0.5   TP 6.0 6.2 6.7       Medications:    furosemide  40 mg Oral BID (Diuretic)    losartan  100 mg Oral Daily    metoprolol tartrate  25 mg Oral 2x Daily(Beta Blocker)    insulin aspart  1-11 Units Subcutaneous TID CC    heparin  5,000 Units Subcutaneous Q8H GIOVANNI    allopurinol  300 mg Oral Daily    atorvastatin  40 mg Oral Nightly    clopidogrel  75 mg Oral Daily    miconazole   Topical BID         Assessment & Plan:      HFpEF, acute on chronic  Hx of CAD s/p CABG  CXR reviewed  BNP elevated  Trop and EKG pending  Started on Lasix IV BID -> now on PO 40 mg BID  Cardiology on consult  Resume losartan  Start lopressor  Echo with EF 55-60%  Management per cardiology  Net IO  Since Admission: -5,195 mL [01/15/25 1525]  T2DM  A1c 6.2%  SSI and frequent accuchecks  HTN  BP well controlled  Continue home meds as indicated  Monitor vitals  HLD  Continue statin           Quality:  DVT Prophylaxis: Heparin s/c                   CARDIOLOGY    /87 (BP Location: Right arm)   Pulse 70   Temp 97.5 °F (36.4 °C) (Oral)   Resp 18   Wt 103 lb 8 oz (46.9 kg)   SpO2 95%   BMI 21.63 kg/m²      Telemetry: SR, frequent PVCs, runs NSVT on tele, longest 10 beats     Intake/Output:  Lab 01/12/25  1246 01/13/25  0750 01/14/25  0730   * 95 133*   BUN 28* 24* 23   CREATSERUM 1.08 1.05 1.01   EGFRCR 66 69 72   CA 9.5 9.5 9.4    141 142   K 4.5 4.2 4.0    109 107   CO2 22.0 24.0 25.0      Lab 01/12/25  1246 01/13/25  0750 01/14/25  0730   RBC 3.23* 3.22* 3.42*   HGB 8.5* 8.4* 9.4*   HCT 27.4* 26.8* 28.2*   MCV 84.8 83.2 82.5   MCH 26.3 26.1 27.5   MCHC 31.0 31.3 33.3   RDW 15.0 15.0 15.1*   NEPRELIM 4.56 3.71 4.68   WBC 7.0 6.6 8.3   .0 195.0 209.0      Neck: no JVD.  Lungs: diminished bilaterally.  Chest wall: No tenderness or deformity.  Heart: Regular rate and rhythm, S1, S2 normal, + systolic murmur, no click, rub or gallop.  Abdomen: Soft, non-tender. Bowel sounds normal. No masses,  No organomegaly.  Extremities: Extremities normal, atraumatic, no cyanosis, 2+ BLE edema.  Pulses: 2+ and symmetric all extremities.  Neurologic: Grossly intact.     Medications:   losartan  100 mg Oral Daily    furosemide  40 mg Intravenous BID (Diuretic)    metoprolol tartrate  25 mg Oral 2x Daily(Beta Blocker)    insulin aspart  1-11 Units Subcutaneous TID CC    heparin  5,000 Units Subcutaneous Q8H GIOVANNI    allopurinol  300 mg Oral Daily    atorvastatin  40 mg Oral Nightly    clopidogrel  75 mg Oral Daily    miconazole   Topical BID      Assessment:     89 year old male with PMH of CAD s/p CABG, HFpEF, DMII, HTN, chronic anemia who presented with significant LE edema and testicular swelling.  His proBNP was elevated at 15,464.      Acute on chronic HFpEF  Admitted with volume overload  -LVEF 50-55% on TTE this admission, last echo in January, 2024 EF 59%, recent stress PET showed resting EF 35% with stress EF of 51% with frequent ectopy during the study which was attributed as the cause of the discrepancy  -baseline weight around 100-104# per daughter, 113# on admission, currently 103#  -diuresing with IV lasix BID with good response  -I/Os net neg 5.1L, 2.75L UOP past 24 hours, weight down 8#  -exacerbation likely due to dietary nonadherence, frequently eating takeout and [employer] soups  -CXR revealed mild interstitial and alveolar edema, still volume up on exam with significant edema  -proBNP on admission 15,464  -GDMT: lasix, losartan, lopressor     CAD s/p remote CABG  Denies anginal symptoms   -statin, plavix, ARB, BB     HTN  BP remains elevated in 150-160s  -losartan, lopressor-losartan increased yesterday, will monitor response     HLD-statin, LDL controlled 40     DMII-A1c 6.2     Chronic anemia  Hgb stable 8-9        Plan:  -Continue losartan 100mg daily, if BP remains elevated, will adjust agents  -Continue lopressor 25mg BID  -Stop IV lasix, transition to PO lasix 40mg BID  -monitor strict I/Os, daily weights, daily BMP  -continue statin, plavix  -labs pending, replace electrolytes as needed per cardiac protocol              MEDICATIONS ADMINISTERED IN LAST 1 DAY:  allopurinol (Zyloprim) tab 300 mg       Date Action Dose Route User    1/15/2025 0857 Given 300 mg Oral Era Landon RN          atorvastatin (Lipitor) tab 40 mg       Date Action Dose Route User    1/14/2025 2115 Given 40 mg Oral Naya Salgado RN          clopidogrel (Plavix) tab 75 mg       Date Action Dose Route User    1/15/2025 0857 Given 75 mg Oral Era Landon RN          furosemide (Lasix) tab 40 mg       Date Action Dose Route User    1/15/2025 1733 Given 40 mg Oral Era Landon RN    1/15/2025 0857 Given 40  mg Oral Era Landon RN          heparin (Porcine) 5000 UNIT/ML injection 5,000 Units       Date Action Dose Route User    1/15/2025 1328 Given 5,000 Units Subcutaneous (Left Upper Arm) Era Landon RN    1/15/2025 0510 Given 5,000 Units Subcutaneous (Left Lower Abdomen) Naya Salgado RN    1/14/2025 2116 Given 5,000 Units Subcutaneous (Right Upper Arm) Naya Salgado RN          insulin aspart (NovoLOG) 100 Units/mL FlexPen 1-11 Units       Date Action Dose Route User    1/15/2025 1258 Given 5 Units Subcutaneous (Right Upper Arm) Era Landon RN    1/15/2025 0833 Given 2 Units Subcutaneous (Right Upper Arm) Era Landon RN          losartan (Cozaar) tab 100 mg       Date Action Dose Route User    1/15/2025 0857 Given 100 mg Oral Era Landon RN          magnesium oxide (Mag-Ox) tab 400 mg       Date Action Dose Route User    1/15/2025 0857 Given 400 mg Oral Era Landon RN          metoprolol tartrate (Lopressor) tab 25 mg       Date Action Dose Route User    1/15/2025 1733 Given 25 mg Oral Era Landon RN    1/15/2025 0509 Given 25 mg Oral Naya Salgado RN          miconazole 2 % powder       Date Action Dose Route User    1/15/2025 0858 Given (none) Topical Era Landon RN    1/14/2025 2118 Given (none) Topical Naya Salgado RN            Vitals (last day)       Date/Time Temp Pulse Resp BP SpO2 Weight O2 Device O2 Flow Rate (L/min) [facility]    01/15/25 1731 98.2 °F (36.8 °C) 66 18 157/66 97 % -- None (Room air) --     01/15/25 1516 -- 65 -- 148/63 -- -- -- --     01/15/25 0855 97.4 °F (36.3 °C) 75 18 146/62 95 % -- None (Room air) --     01/15/25 0602 -- -- -- -- -- 103 lb 8 oz (46.9 kg) -- -- LR    01/15/25 0508 -- 70 -- 156/87 -- -- -- -- KG    01/15/25 0202 97.5 °F (36.4 °C) 76 18 150/66 95 % -- None (Room air) -- KG    01/14/25 2111 98.2 °F (36.8 °C) 66 16 155/65 97 % -- None (Room air) -- KG    01/14/25 1950 -- 75 -- -- -- -- -- -- KD    01/14/25 1700  -- 61 -- 134/59 95 % -- None (Room air) --     01/14/25 1117 -- 65 -- 153/63 -- -- None (Room air) --     01/14/25 0725 97.2 °F (36.2 °C) 63 18 165/66 94 % -- None (Room air) --     01/14/25 0359 98.1 °F (36.7 °C) 72 17 155/80 94 % -- None (Room air) --     01/14/25 0259 -- -- -- -- -- 108 lb 12.8 oz (49.4 kg) -- -- LR

## 2025-01-15 NOTE — PLAN OF CARE
Problem: METABOLIC/FLUID AND ELECTROLYTES - ADULT  Goal: Glucose maintained within prescribed range  Description: INTERVENTIONS:  - Monitor Blood Glucose as ordered  - Assess for signs and symptoms of hyperglycemia and hypoglycemia  - Administer ordered medications to maintain glucose within target range  - Assess barriers to adequate nutritional intake and initiate nutrition consult as needed  - Instruct patient on self management of diabetes  Outcome: Progressing  Goal: Electrolytes maintained within normal limits  Description: INTERVENTIONS:  - Monitor labs and rhythm and assess patient for signs and symptoms of electrolyte imbalances  - Administer electrolyte replacement as ordered  - Monitor response to electrolyte replacements, including rhythm and repeat lab results as appropriate  - Fluid restriction as ordered  - Instruct patient on fluid and nutrition restrictions as appropriate  Outcome: Progressing     Problem: SKIN/TISSUE INTEGRITY - ADULT  Goal: Skin integrity remains intact  Description: INTERVENTIONS  - Assess and document risk factors for pressure ulcer development  - Assess and document skin integrity  - Monitor for areas of redness and/or skin breakdown  - Initiate interventions, skin care algorithm/standards of care as needed  Outcome: Progressing     Problem: CARDIOVASCULAR - ADULT  Goal: Maintains optimal cardiac output and hemodynamic stability  Description: INTERVENTIONS:  - Monitor vital signs, rhythm, and trends  - Monitor for bleeding, hypotension and signs of decreased cardiac output  - Evaluate effectiveness of vasoactive medications to optimize hemodynamic stability  - Monitor arterial and/or venous puncture sites for bleeding and/or hematoma  - Assess quality of pulses, skin color and temperature  - Assess for signs of decreased coronary artery perfusion - ex. Angina  - Evaluate fluid balance, assess for edema, trend weights  Outcome: Progressing  Goal: Absence of cardiac arrhythmias  or at baseline  Description: INTERVENTIONS:  - Continuous cardiac monitoring, monitor vital signs, obtain 12 lead EKG if indicated  - Evaluate effectiveness of antiarrhythmic and heart rate control medications as ordered  - Initiate emergency measures for life threatening arrhythmias  - Monitor electrolytes and administer replacement therapy as ordered  Outcome: Progressing

## 2025-01-15 NOTE — PROGRESS NOTES
Progress Note  Mohamud Fitzpatrick Patient Status:  Inpatient    1935 MRN [id number]   Location [facility] 3W/SW Attending Sariah Solomon MD   Hosp Day # 3 PCP Priyank Rodriguez MD     Subjective:  Denies cardiac complaints    Objective:  /87 (BP Location: Right arm)   Pulse 70   Temp 97.5 °F (36.4 °C) (Oral)   Resp 18   Wt 103 lb 8 oz (46.9 kg)   SpO2 95%   BMI 21.63 kg/m²     Telemetry: SR, frequent PVCs, runs NSVT on tele, longest 10 beats    Intake/Output:    Intake/Output Summary (Last 24 hours) at 1/15/2025 0710  Last data filed at 1/15/2025 0511  Gross per 24 hour   Intake 600 ml   Output 2750 ml   Net -2150 ml     Last 3 Weights   01/15/25 0602 103 lb 8 oz (46.9 kg)   25 0259 108 lb 12.8 oz (49.4 kg)   25 0402 108 lb 14.4 oz (49.4 kg)   25 1539 113 lb 3.2 oz (51.3 kg)   25 1425 114 lb (51.7 kg)   24 0843 109 lb 12.8 oz (49.8 kg)     Labs:  Recent Labs   Lab 25  1246 25  0750 25  0730   * 95 133*   BUN 28* 24* 23   CREATSERUM 1.08 1.05 1.01   EGFRCR 66 69 72   CA 9.5 9.5 9.4    141 142   K 4.5 4.2 4.0    109 107   CO2 22.0 24.0 25.0     Recent Labs   Lab 25  1246 25  0750 25  0730   RBC 3.23* 3.22* 3.42*   HGB 8.5* 8.4* 9.4*   HCT 27.4* 26.8* 28.2*   MCV 84.8 83.2 82.5   MCH 26.3 26.1 27.5   MCHC 31.0 31.3 33.3   RDW 15.0 15.0 15.1*   NEPRELIM 4.56 3.71 4.68   WBC 7.0 6.6 8.3   .0 195.0 209.0     Recent Labs   Lab 25  1246   TROPHS 32     Lab Results   Component Value Date/Time    HDL 37 (L) 2024 06:33 AM    LDL 40 2024 06:33 AM    TRIG 78 2024 06:33 AM     No results found for: \"DDIMER\"  Lab Results   Component Value Date    TSH 1.193 2024     Review of Systems:   Constitutional: No fevers, chills, fatigue or night sweats.  Respiratory: Denies cough, wheezing or shortness of breath.  CV: Denies chest pain, palpitations, orthopnea, PND or dizziness.  GI: No  nausea, vomiting or diarrhea. No blood in stools.    Physical Exam:   General: Alert, cooperative, no distress, appears stated age.  Neck: no JVD.  Lungs: diminished bilaterally.  Chest wall: No tenderness or deformity.  Heart: Regular rate and rhythm, S1, S2 normal, + systolic murmur, no click, rub or gallop.  Abdomen: Soft, non-tender. Bowel sounds normal. No masses,  No organomegaly.  Extremities: Extremities normal, atraumatic, no cyanosis, 2+ BLE edema.  Pulses: 2+ and symmetric all extremities.  Neurologic: Grossly intact.    Medications:   losartan  100 mg Oral Daily    furosemide  40 mg Intravenous BID (Diuretic)    metoprolol tartrate  25 mg Oral 2x Daily(Beta Blocker)    insulin aspart  1-11 Units Subcutaneous TID CC    heparin  5,000 Units Subcutaneous Q8H GIOVANNI    allopurinol  300 mg Oral Daily    atorvastatin  40 mg Oral Nightly    clopidogrel  75 mg Oral Daily    miconazole   Topical BID     Assessment:    89 year old male with PMH of CAD s/p CABG, HFpEF, DMII, HTN, chronic anemia who presented with significant LE edema and testicular swelling. His proBNP was elevated at 15,464.     Acute on chronic HFpEF  Admitted with volume overload  -LVEF 50-55% on TTE this admission, last echo in January, 2024 EF 59%, recent stress PET showed resting EF 35% with stress EF of 51% with frequent ectopy during the study which was attributed as the cause of the discrepancy  -baseline weight around 100-104# per daughter, 113# on admission, currently 103#  -diuresing with IV lasix BID with good response  -I/Os net neg 5.1L, 2.75L UOP past 24 hours, weight down 8#  -exacerbation likely due to dietary nonadherence, frequently eating takeout and panera soups  -CXR revealed mild interstitial and alveolar edema, still volume up on exam with significant edema  -proBNP on admission 15,464  -GDMT: lasix, losartan, lopressor    CAD s/p remote CABG  Denies anginal symptoms   -statin, plavix, ARB, BB    HTN  BP remains elevated in  150-160s  -losartan, lopressor-losartan increased yesterday, will monitor response    HLD-statin, LDL controlled 40    DMII-A1c 6.2    Chronic anemia  Hgb stable 8-9      Plan:  -Continue losartan 100mg daily, if BP remains elevated, will adjust agents  -Continue lopressor 25mg BID  -Stop IV lasix, transition to PO lasix 40mg BID  -monitor strict I/Os, daily weights, daily BMP  -continue statin, plavix  -labs pending, replace electrolytes as needed per cardiac protocol      Plan of care discussed with patient and daughter at bedside, RN.        Liz Espino, APRN  01/15/25  7:13 AM  [phone] [place]  [phone] [place]      Addendum: labs reviewed, Mg 1.7, replaced per protocol. If BP stable, can discharge later this afternoon.   Liz Espino, MSN, APN, FNP-BC, CCK      I have personally seen and examined patient.   I have independently formulated assessment and plan  I agree with the AP note    Doing well. Good response  Switch to oral lasix  OP follow up with cardiology  Cardiology service will sign off. Please call with questions    Thank you for the opportunity to participate in the care of your patient.     Brayan Berg MD  Interventional Cardiologist  [facility]

## 2025-01-15 NOTE — PLAN OF CARE
Pt from home with family. Alert and oriented. IV lasix switched to PO. Call light within reach. Safety precautions in place. Frequent rounding by staff.   Problem: Patient Centered Care  Goal: Patient preferences are identified and integrated in the patient's plan of care  Description: Interventions:  - What would you like us to know as we care for you? Home w/ Family   - Provide timely, complete, and accurate information to patient/family  - Incorporate patient and family knowledge, values, beliefs, and cultural backgrounds into the planning and delivery of care  - Encourage patient/family to participate in care and decision-making at the level they choose  - Honor patient and family perspectives and choices  Outcome: Progressing     Problem: METABOLIC/FLUID AND ELECTROLYTES - ADULT  Goal: Glucose maintained within prescribed range  Description: INTERVENTIONS:  - Monitor Blood Glucose as ordered  - Assess for signs and symptoms of hyperglycemia and hypoglycemia  - Administer ordered medications to maintain glucose within target range  - Assess barriers to adequate nutritional intake and initiate nutrition consult as needed  - Instruct patient on self management of diabetes  Outcome: Progressing  Goal: Electrolytes maintained within normal limits  Description: INTERVENTIONS:  - Monitor labs and rhythm and assess patient for signs and symptoms of electrolyte imbalances  - Administer electrolyte replacement as ordered  - Monitor response to electrolyte replacements, including rhythm and repeat lab results as appropriate  - Fluid restriction as ordered  - Instruct patient on fluid and nutrition restrictions as appropriate  Outcome: Progressing     Problem: SKIN/TISSUE INTEGRITY - ADULT  Goal: Skin integrity remains intact  Description: INTERVENTIONS  - Assess and document risk factors for pressure ulcer development  - Assess and document skin integrity  - Monitor for areas of redness and/or skin breakdown  - Initiate  interventions, skin care algorithm/standards of care as needed  Outcome: Progressing     Problem: CARDIOVASCULAR - ADULT  Goal: Maintains optimal cardiac output and hemodynamic stability  Description: INTERVENTIONS:  - Monitor vital signs, rhythm, and trends  - Monitor for bleeding, hypotension and signs of decreased cardiac output  - Evaluate effectiveness of vasoactive medications to optimize hemodynamic stability  - Monitor arterial and/or venous puncture sites for bleeding and/or hematoma  - Assess quality of pulses, skin color and temperature  - Assess for signs of decreased coronary artery perfusion - ex. Angina  - Evaluate fluid balance, assess for edema, trend weights  Outcome: Progressing  Goal: Absence of cardiac arrhythmias or at baseline  Description: INTERVENTIONS:  - Continuous cardiac monitoring, monitor vital signs, obtain 12 lead EKG if indicated  - Evaluate effectiveness of antiarrhythmic and heart rate control medications as ordered  - Initiate emergency measures for life threatening arrhythmias  - Monitor electrolytes and administer replacement therapy as ordered  Outcome: Progressing     Problem: Patient/Family Goals  Goal: Patient/Family Long Term Goal  Description: Patient's Long Term Goal: Im from home     Interventions:  - See additional Care Plan goals for specific interventions  Outcome: Progressing  Goal: Patient/Family Short Term Goal  Description: Patient's Short Term Goal: Go home     Interventions:   - See additional Care Plan goals for specific interventions  Outcome: Progressing

## 2025-01-16 ENCOUNTER — APPOINTMENT (OUTPATIENT)
Age: 89
End: 2025-01-16
Attending: INTERNAL MEDICINE
Payer: MEDICARE

## 2025-01-16 LAB
ALBUMIN SERPL-MCNC: 3.9 G/DL (ref 3.2–4.8)
ANION GAP SERPL CALC-SCNC: 8 MMOL/L (ref 0–18)
BASOPHILS # BLD AUTO: 0.07 X10(3) UL (ref 0–0.2)
BASOPHILS NFR BLD AUTO: 0.7 %
BUN BLD-MCNC: 22 MG/DL (ref 9–23)
BUN/CREAT SERPL: 21.8 (ref 10–20)
CALCIUM BLD-MCNC: 9.5 MG/DL (ref 8.7–10.4)
CHLORIDE SERPL-SCNC: 101 MMOL/L (ref 98–112)
CO2 SERPL-SCNC: 29 MMOL/L (ref 21–32)
CREAT BLD-MCNC: 1.01 MG/DL
DEPRECATED RDW RBC AUTO: 44.1 FL (ref 35.1–46.3)
EGFRCR SERPLBLD CKD-EPI 2021: 72 ML/MIN/1.73M2 (ref 60–?)
EOSINOPHIL # BLD AUTO: 1.7 X10(3) UL (ref 0–0.7)
EOSINOPHIL NFR BLD AUTO: 16.3 %
ERYTHROCYTE [DISTWIDTH] IN BLOOD BY AUTOMATED COUNT: 14.8 % (ref 11–15)
GLUCOSE BLD-MCNC: 163 MG/DL (ref 70–99)
GLUCOSE BLDC GLUCOMTR-MCNC: 183 MG/DL (ref 70–99)
GLUCOSE BLDC GLUCOMTR-MCNC: 203 MG/DL (ref 70–99)
HCT VFR BLD AUTO: 28.1 %
HGB BLD-MCNC: 9.6 G/DL
IMM GRANULOCYTES # BLD AUTO: 0.04 X10(3) UL (ref 0–1)
IMM GRANULOCYTES NFR BLD: 0.4 %
LYMPHOCYTES # BLD AUTO: 1.26 X10(3) UL (ref 1–4)
LYMPHOCYTES NFR BLD AUTO: 12.1 %
MAGNESIUM SERPL-MCNC: 1.7 MG/DL (ref 1.6–2.6)
MCH RBC QN AUTO: 27.8 PG (ref 26–34)
MCHC RBC AUTO-ENTMCNC: 34.2 G/DL (ref 31–37)
MCV RBC AUTO: 81.4 FL
MONOCYTES # BLD AUTO: 1.07 X10(3) UL (ref 0.1–1)
MONOCYTES NFR BLD AUTO: 10.3 %
NEUTROPHILS # BLD AUTO: 6.27 X10 (3) UL (ref 1.5–7.7)
NEUTROPHILS # BLD AUTO: 6.27 X10(3) UL (ref 1.5–7.7)
NEUTROPHILS NFR BLD AUTO: 60.2 %
NT-PROBNP SERPL-MCNC: ABNORMAL PG/ML (ref ?–450)
OSMOLALITY SERPL CALC.SUM OF ELEC: 293 MOSM/KG (ref 275–295)
PHOSPHATE SERPL-MCNC: 3.4 MG/DL (ref 2.4–5.1)
PLATELET # BLD AUTO: 211 10(3)UL (ref 150–450)
POTASSIUM SERPL-SCNC: 4.2 MMOL/L (ref 3.5–5.1)
RBC # BLD AUTO: 3.45 X10(6)UL
SODIUM SERPL-SCNC: 138 MMOL/L (ref 136–145)
WBC # BLD AUTO: 10.4 X10(3) UL (ref 4–11)

## 2025-01-16 PROCEDURE — 99239 HOSP IP/OBS DSCHRG MGMT >30: CPT | Performed by: HOSPITALIST

## 2025-01-16 RX ORDER — AMLODIPINE BESYLATE 10 MG/1
10 TABLET ORAL DAILY
Status: DISCONTINUED | OUTPATIENT
Start: 2025-01-16 | End: 2025-01-16

## 2025-01-16 NOTE — DISCHARGE SUMMARY
[facility]  part of [facility]    Discharge Summary    Mohamud Fitzpatrick Patient Status:  Inpatient    1935 MRN [id number]   Location [facility] 3W/SW Attending Mauro Paz MD   Hosp Day # 4 PCP Priyank Rodriguez MD     Date of Admission: 2025 Disposition: Home or Self Care     Date of Discharge: 25    Admitting Diagnosis: Congestive heart failure, unspecified HF chronicity, unspecified heart failure type (HCC) [I50.9]    Hospital Discharge Diagnoses: HFpEF    Lace+ Score: 77  59-90 High Risk  29-58 Medium Risk  0-28   Low Risk.    TCM Follow-Up Recommendation:  LACE > 58: High Risk of readmission after discharge from the hospital.    Problem List:   Patient Active Problem List   Diagnosis    Type 2 diabetes mellitus without complication, without long-term current use of insulin (HCC)    Coronary artery disease involving native coronary artery of native heart without angina pectoris    Essential hypertension    Gastroesophageal reflux disease    Bilateral carotid artery stenosis    RBBB (right bundle branch block)    Primary cerebellar degeneration (HCC)    Other iron deficiency anemia    Abnormal CT of the chest    Subacute cough    Type 2 diabetes mellitus with hypoglycemia without coma, without long-term current use of insulin (HCC)    Type 2 diabetes mellitus with hyperglycemia, without long-term current use of insulin (HCC)    Stage 3a chronic kidney disease (HCC)    Congestive heart failure, unspecified HF chronicity, unspecified heart failure type (HCC)       Reason for Admission: leg swelling, scrotal swelling    Physical Exam:   Vitals:    25 0937   BP: 142/63   Pulse: 70   Resp: 20   Temp: 97.3 °F (36.3 °C)     General: No acute distress. Alert and oriented x 3.  Respiratory: Clear to auscultation bilaterally. No wheezes. No rhonchi.  Cardiovascular: S1, S2. Regular rate and rhythm. No murmurs, no rubs or gallops. Equal pulses.   Abdomen:  Soft, nontender, nondistended.  Positive bowel sounds. No rebound, guarding or organomegaly.  Neurologic: No focal neurological deficits. CNII-XII grossly intact.  Extremities: 1+ edema in bilateral LE.     History of Present Illness:   Per Dr. Juanito Luuedwin Fitzpatrick is a 89 year old male with a past medical history of CAD s/p CABG, HFpEF, HTN, T2DM presented to the ER with complaints of worsening leg swelling and scrotal swelling for the past few days.   He denied any chest pain, n/v/d or any other complaints. He deneid any recent travel or sick contacts.   He has been having a lot of panera soups - family stated they were unaware of the salt content.  Family present at the bedside and translating.     Hospital Course:   HFpEF, acute on chronic  Hx of CAD s/p CABG  CXR reviewed  BNP elevated  Trop and EKG reviewed  Started on Lasix IV BID -> now on PO 40 mg BID  Cardiology was on consult  Resumed losartan  Started lopressor  Echo with EF 55-60%  Net IO Since Admission: -5,339 mL [01/18/25 0927]    T2DM  A1c 6.2%  HTN  BP well controlled  Continue home meds    HLD  Continue statin    Consultations: Cardiology    Procedures: none    Complications: none    Discharge Condition: Stable    Discharge Medications:      Discharge Medications        START taking these medications        Instructions Prescription details   metoprolol tartrate 25 MG Tabs  Commonly known as: Lopressor      Take 1 tablet (25 mg total) by mouth 2x Daily(Beta Blocker).   Quantity: 60 tablet  Refills: 1            CHANGE how you take these medications        Instructions Prescription details   furosemide 40 MG Tabs  Commonly known as: Lasix  What changed:   medication strength  how much to take  when to take this      Take 1 tablet (40 mg total) by mouth BID (Diuretic).   Quantity: 60 tablet  Refills: 1     losartan 100 MG Tabs  Commonly known as: Cozaar  What changed:   medication strength  how much to take      Take 1 tablet (100 mg total)  by mouth daily.   Quantity: 30 tablet  Refills: 1            CONTINUE taking these medications        Instructions Prescription details   allopurinol 300 MG Tabs  Commonly known as: Zyloprim      Take 1 tablet (300 mg total) by mouth daily.   Quantity: 90 tablet  Refills: 1     amLODIPine 10 MG Tabs  Commonly known as: Norvasc      Take 1 tablet (10 mg total) by mouth daily.   Quantity: 90 tablet  Refills: 3     atorvastatin 40 MG Tabs  Commonly known as: Lipitor      Take 1 tablet (40 mg total) by mouth nightly.   Refills: 0     clopidogrel 75 MG Tabs  Commonly known as: Plavix      Take 1 tablet (75 mg total) by mouth daily.   Quantity: 90 tablet  Refills: 1     cyanocobalamin 1000 MCG Tabs  Commonly known as: Vitamin B12      Take 1 tablet (1,000 mcg total) by mouth daily.   Quantity: 30 tablet  Refills: 0     dorzolamide-timolol 2-0.5 % Soln  Commonly known as: Cosopt      Place 1 drop into both eyes 2 (two) times daily.   Refills: 0     glipiZIDE-metFORMIN HCl 2.5-500 MG Tabs      Take 1 tablet by mouth 2 (two) times daily with meals.   Quantity: 180 tablet  Refills: 3     Glucose Blood Strp      Check glucose once daily   Refills: 0     Accu-Chek SmartView Strp      Use 1 (one) new strip twice daily for blood glucose monitoring   Quantity: 200 strip  Refills: 3     Lumigan 0.01 % Soln  Generic drug: bimatoprost      Place 1 drop into both eyes every evening.   Refills: 0     Neomycin-Polymyxin-Dexameth 3.5-07761-0.1 Susp  Notes to patient: Tonight      2 (two) times daily.   Refills: 0     Stimulant Laxative 8.6-50 MG Tabs  Generic drug: sennosides-docusate      Take 1 tablet by mouth daily.   Quantity: 90 tablet  Refills: 3     terazosin 2 MG Caps  Commonly known as: Hytrin      Take 1 capsule (2 mg total) by mouth nightly.   Quantity: 90 capsule  Refills: 3     triamcinolone 0.1 % Crea  Commonly known as: Kenalog      Apply topically 2 (two) times daily as needed.   Quantity: 45 g  Refills: 3     TRUEplus  Lancets 33G Misc      1 Lancet by Finger stick route 2 (two) times daily.   Quantity: 200 each  Refills: 3            STOP taking these medications      metoprolol succinate  MG Tb24  Commonly known as: Toprol XL        traMADol 50 MG Tabs  Commonly known as: Ultram                  Where to Get Your Medications        These medications were sent to [id number] [place] [place] - [address] [facility], [phone], [phone]  [address], [facility] [address]      Hours: 24-hours Phone: [phone]   furosemide 40 MG Tabs  losartan 100 MG Tabs  metoprolol tartrate 25 MG Tabs         Greater than 35 minutes spent, >50% spent counseling re: treatment plan and workup     Mauro Paz MD  1/16/2025

## 2025-01-16 NOTE — PLAN OF CARE
Problem: METABOLIC/FLUID AND ELECTROLYTES - ADULT  Goal: Electrolytes maintained within normal limits  Description: INTERVENTIONS:  - Monitor labs and rhythm and assess patient for signs and symptoms of electrolyte imbalances  - Administer electrolyte replacement as ordered  - Monitor response to electrolyte replacements, including rhythm and repeat lab results as appropriate  - Fluid restriction as ordered  - Instruct patient on fluid and nutrition restrictions as appropriate  Outcome: Progressing     Problem: SKIN/TISSUE INTEGRITY - ADULT  Goal: Skin integrity remains intact  Description: INTERVENTIONS  - Assess and document risk factors for pressure ulcer development  - Assess and document skin integrity  - Monitor for areas of redness and/or skin breakdown  - Initiate interventions, skin care algorithm/standards of care as needed  Outcome: Progressing     Problem: CARDIOVASCULAR - ADULT  Goal: Maintains optimal cardiac output and hemodynamic stability  Description: INTERVENTIONS:  - Monitor vital signs, rhythm, and trends  - Monitor for bleeding, hypotension and signs of decreased cardiac output  - Evaluate effectiveness of vasoactive medications to optimize hemodynamic stability  - Monitor arterial and/or venous puncture sites for bleeding and/or hematoma  - Assess quality of pulses, skin color and temperature  - Assess for signs of decreased coronary artery perfusion - ex. Angina  - Evaluate fluid balance, assess for edema, trend weights  Outcome: Progressing  Goal: Absence of cardiac arrhythmias or at baseline  Description: INTERVENTIONS:  - Continuous cardiac monitoring, monitor vital signs, obtain 12 lead EKG if indicated  - Evaluate effectiveness of antiarrhythmic and heart rate control medications as ordered  - Initiate emergency measures for life threatening arrhythmias  - Monitor electrolytes and administer replacement therapy as ordered  Outcome: Progressing   Pt reported itchiness to arms and back,  topical cream and PO PRN given.

## 2025-01-16 NOTE — CM/SW NOTE
01/16/25 1100   Discharge disposition   Expected discharge disposition Home or Self   Outpatient services Outpatient rehab services   Patient Declines Recommended Services Yes  (declined HH - wants Outpatient PT)   Discharge transportation Private car     Per chart, pt has DC order for today.    Reminder message sent to RN and MD - requested MD enter order/write Rx for Outpatient PT prior to pt's discharge.    Pt is cleared from SW/CM stand point.      PLAN: Home w/ Outpatient PT          TALA Caballero, LSW [id number]

## 2025-01-17 ENCOUNTER — OFFICE VISIT (OUTPATIENT)
Age: 89
End: 2025-01-17
Attending: INTERNAL MEDICINE
Payer: MEDICARE

## 2025-01-17 ENCOUNTER — PATIENT OUTREACH (OUTPATIENT)
Dept: CASE MANAGEMENT | Age: 89
End: 2025-01-17

## 2025-01-17 VITALS
DIASTOLIC BLOOD PRESSURE: 46 MMHG | HEART RATE: 65 BPM | SYSTOLIC BLOOD PRESSURE: 114 MMHG | TEMPERATURE: 98 F | OXYGEN SATURATION: 98 % | HEIGHT: 60 IN | RESPIRATION RATE: 16 BRPM | BODY MASS INDEX: 20.68 KG/M2 | WEIGHT: 105.31 LBS

## 2025-01-17 DIAGNOSIS — D50.8 OTHER IRON DEFICIENCY ANEMIA: Primary | ICD-10-CM

## 2025-01-17 NOTE — PROGRESS NOTES
Patient arrives to infusion for Feraheme . Patient denies any issues or concerns.      Ordering Provider: Juanito Caruso MD  Order Exp: After this dose     Patient appeared to tolerate infusion without difficulty or complaint. No s/s of adverse reaction noted. VSS post infusion.     Reviewed next apt date/time: pt to follow up with ordering provider, family member verbalized understanding.     Education Record  Learner:  Patient and Family Member  Disease / Diagnosis: ROCCO  Barriers / Limitations:  None  Method:  Discussion  General Topics:  Plan of care reviewed  Outcome:  Shows understanding

## 2025-01-17 NOTE — PROGRESS NOTES
Attempt # 1 to contact patient, voicemail is full. Will try again later.     Transitions of Care Navigation  Discharge Date: 25  Contact Date: 2025    Transitions of Care Assessment:  MUNIRA Initial Assessment    General:  Assessment completed with: Patient  Patient Subjective: Spoke with patient he is feeling well since being home from the hospital.  His shortness of breath has resolved as well as his testicular swelling. The swelling in his lower extremities has improved.  Medications reviewed. Patient has stopped taking medications as directed on the AVS and has picked up his new medication for the pharmacy.  He is also taking lasix BID now. He has follow up appointment with nurse practitioner @ cardiology on .  Patient does monitor his blood pressure at home but has not taken it since he was discharged.  Patient is diabetic and checks blood sugars this morning it was 105.  He also weighs himself daily but did forget today.   Yesterday he was 103   Patient denies the following symptoms:  fever, headache, dizziness, chest pain, shortness of breath, abdominal pain, nausea or vomiting.    Patient was instructed to go to emergency room or call 911 if he begins experiencing chest pain, shortness of breath or severe dizziness.   Patient did not have any additional questions or concerns.  Chief Complaint: Acute on chronic HFpEF  with volume overload  Verify patient name and  with patient/ caregiver: Yes    Hospital Stay/Discharge:  Tell me what you understand of why you were in the hospital or emergency department: I had water in my body  Prior to leaving the hospital were your Discharge Instructions reviewed with you?: Yes  Did you receive a copy of your written Discharge Instructions?: Yes  What questions do you have about your Discharge Instructions?: patient did not have any additional questions  Do you feel better or worse since you left the hospital or emergency department?: Better    Follow - Up  Appointment:  Do you have a follow-up appointment?: Yes  Date: 01/23/25  Physician: NP with cardio  Are there any barriers to getting to your follow-up appointment?: No    Home Health/DME:  Prior to leaving the hospital was Home Health (HH) arranged for you?: N/A  Are HH needs identified by staff during the assessment?: No     Prior to leaving the hospital or emergency department was Durable Medical Equipment (DME), medical supplies, or infusions arranged for you?: N/A  Are DME/medical supply/infusions needs identified by staff during this assessment?: No     Medications/Diet:  Did any of your medications change, during or after your hospital stay or ED visit?: Yes  Do you have your new or updated medications?: Yes  Do you understand what your medications are for and possible side effects?: Yes  Are there any reasons that keep you from taking your medication as prescribed?: No  Any concerns about medication refills?: No    Were you given a different diet per your Discharge Instructions?: No  Reason: n/a     Questions/Concerns:  Do you have any questions or concerns that have not been discussed?: No       Nursing Interventions:  Assessed shortness of breath and for other signs/symptoms.  Reviewed medications. Instructed when to return to emergency room. Confirmed appointment with primary care physician and/or specialist.       Medications:  Medication Reconciliation:  I am aware of an inpatient discharge within the last 30 days.  The discharge medication list has been reconciled with the patient's current medication list and reviewed by me. See medication list for additions of new medication, and changes to current doses of medications and discontinued medications.  Current Outpatient Medications   Medication Sig Dispense Refill    furosemide 40 MG Oral Tab Take 1 tablet (40 mg total) by mouth BID (Diuretic). 60 tablet 1    losartan 100 MG Oral Tab Take 1 tablet (100 mg total) by mouth daily. 30 tablet 1     metoprolol tartrate 25 MG Oral Tab Take 1 tablet (25 mg total) by mouth 2x Daily(Beta Blocker). 60 tablet 1    allopurinol 300 MG Oral Tab Take 1 tablet (300 mg total) by mouth daily. 90 tablet 1    clopidogrel 75 MG Oral Tab Take 1 tablet (75 mg total) by mouth daily. 90 tablet 1    amLODIPine 10 MG Oral Tab Take 1 tablet (10 mg total) by mouth daily. 90 tablet 3    glipiZIDE-metFORMIN HCl 2.5-500 MG Oral Tab Take 1 tablet by mouth 2 (two) times daily with meals. 180 tablet 3    Glucose Blood (ACCU-CHEK SMARTVIEW) In Vitro Strip Use 1 (one) new strip twice daily for blood glucose monitoring 200 strip 3    TRUEplus Lancets 33G Does not apply Misc 1 Lancet by Finger stick route 2 (two) times daily. 200 each 3    STIMULANT LAXATIVE 8.6-50 MG Oral Tab Take 1 tablet by mouth daily. 90 tablet 3    terazosin 2 MG Oral Cap Take 1 capsule (2 mg total) by mouth nightly. 90 capsule 3    Neomycin-Polymyxin-Dexameth 3.5-01649-9.1 Ophthalmic Suspension 2 (two) times daily.      triamcinolone 0.1 % External Cream Apply topically 2 (two) times daily as needed. 45 g 3    cyanocobalamine 1000 MCG Oral Tab Take 1 tablet (1,000 mcg total) by mouth daily. 30 tablet 0    atorvastatin 40 MG Oral Tab Take 1 tablet (40 mg total) by mouth nightly.      Glucose Blood In Vitro Strip Check glucose once daily      Dorzolamide HCl-Timolol Mal 22.3-6.8 MG/ML Ophthalmic Solution Place 1 drop into both eyes 2 (two) times daily.      LUMIGAN 0.01 % Ophthalmic Solution Place 1 drop into both eyes every evening.         Diagnosis specifics:  With your CHF diagnosis weighing yourself is very important.  How often are you weighing yourself? daily  Is there any reason you are unable to weigh yourself daily? No   What was your weight yesterday? 103   Today? Forgot to weigh  Were you told about any fluid restrictions? Yes  Have you noticed any shortness of breath or waking up short of breath? no  Since discharge do you feel you are urinating more or less?  no  Are you urinating more at night? no  Do you notice any pain or swelling in your abdomen? no   Ankles or legs? no  Educated patient on low sodium diet to consume less than 2000 mg of sodium per day.      Follow-up Appointments:  Your appointments       Date & Time Appointment Department ([place])    Jan 17, 2025 4:00 PM CST Infusion Visit with LENA STOVALLN 3 Sharmila [facility]et [facility] ([facility])        Mar 14, 2025 8:30 AM CDT HEMATOLOGY ONCOLOGY FOLLOW UP with Juanito Caruso MD Sharmila Jeet [facility] Hematology Oncology [facility])        Oct 14, 2025 3:45 PM CDT Follow Up Visit with Sandip Pérez MD [facility] ([facility])              [facility]  [address]  [place] [address]  [phone] Sharmila MELGARJeet [facility]  [address]  [place] [address]  [phone] [place] RAMÓNJeet [facility] Hematology Oncology [facility]  [address]  [place] [address]  [phone]            Transitional [facility] Clinic  Was TCC Ordered: No    Primary Care Provider (If no TCC appointment)  Does patient already have a PCP appointment scheduled? No  Nurse Care Manager Attempted to schedule PCP office HFU appointment with patient   -If no appointment scheduled: Explain patient has appointment with cardiology     Specialist  Does the patient have any other follow-up appointment(s) need to be scheduled? Yes   -If yes: Nurse Care Manager reviewed upcoming specialist appointments with patient: Yes   -Does the patient need assistance scheduling appointment(s): No    [x]  Patient verbally agrees to additional follow-up calls from Nurse Care Manager    Book By Date: 1/23/25

## 2025-01-21 VITALS
OXYGEN SATURATION: 96 % | WEIGHT: 100.81 LBS | BODY MASS INDEX: 21 KG/M2 | TEMPERATURE: 97 F | SYSTOLIC BLOOD PRESSURE: 142 MMHG | HEART RATE: 70 BPM | RESPIRATION RATE: 20 BRPM | DIASTOLIC BLOOD PRESSURE: 63 MMHG

## 2025-01-22 ENCOUNTER — TELEPHONE (OUTPATIENT)
Age: 89
End: 2025-01-22

## 2025-01-22 NOTE — TELEPHONE ENCOUNTER
Pt called confused about his follow up appointments, he thought he had an appointment tomorrow at 4pm. Confirmed with him that he is done with Iron Infusions for now and has a follow up appointment on 3/14 with Dr. Caruso. Told Pt I would call his daughter to relay that to him. Left VM with daughter about follow up appointment and told her he completed his two Iron infusions and he does not have an appointment tomorrow. Left number for her to call back if she has any questions.

## 2025-01-27 ENCOUNTER — PATIENT OUTREACH (OUTPATIENT)
Dept: CASE MANAGEMENT | Age: 89
End: 2025-01-27

## 2025-01-27 NOTE — PROGRESS NOTES
Voicemail is full, will try again later.     MUNIRA Follow-up Assessment    General:  Assessment completed with: Patient  Community Resources: Other    Progress/Care Plan:  Is the patient progressing as planned?: Yes  Care Plan Update: Spoke with patient he canceled his appointment with nurse practitioner @ cardiology office as he only wants to see a MD.  His cardiologist was Dr. Moon but he retired.  He is unsure if he has an upcoming appointment. Asked if nurse care manager would contact [facility] to inquire and call him back with information. Patient reports his most recent blood pressure was 116/59, fasting blood sugar this morning was 90, denies shortness of breath, swelling in lower extremities continues to improve and is not like it was when he was admitted and today's weight 100.  Patient did not have any additional questions or concerns.  Spoke with [facility] and patient has an upcoming appointment on 2/19 @ 4 pm, Dr. Shaw.  Patient notified.  New Care Plan: continue to monitor for shortness of breath, increasing leg swelling, increase in weight  Frequency/Follow Up Plan: 1 week     Notes:  Navigator Notes: shortness of breath, leg swelling, weight, blood pressure, blood sugar

## 2025-01-31 ENCOUNTER — NURSE TRIAGE (OUTPATIENT)
Dept: INTERNAL MEDICINE CLINIC | Facility: CLINIC | Age: 89
End: 2025-01-31

## 2025-01-31 NOTE — TELEPHONE ENCOUNTER
Action Requested: Summary for Provider     []  Critical Lab, Recommendations Needed  [] Need Additional Advice  []   FYI    []   Need Orders  [] Need Medications Sent to Pharmacy  []  Other     SUMMARY: Disposition per protocol  is to be seen in office within 2 weeks.  Patient accepts  afternoon appointment with Dr Rodriguez only:  Future Appointments   Date Time Provider Department Center   3/5/2025  2:20 PM Priyank Rodriguez MD [facility]  [place]       Reason for call: Itching  Onset: Years ago  Patient calling,verified name and date of birth. He requests a follow up appointment with Dr Rodriguez. And asks for home care advice for dry skin for many years. Denies other symptoms. Reviewed home care advice including bathe with tepid water, mild cleanser such as aveeno then apply cream such as Eucerin, avoid bathing in hot water, call back if itching worsens or new symptoms develop. Patient verbalizes understanding and agrees to plan of care.       Reason for Disposition   Itching is a chronic symptom (recurrent or ongoing AND present > 4 weeks)    Protocols used: Itching - Widespread-A-OH

## 2025-02-03 ENCOUNTER — PATIENT OUTREACH (OUTPATIENT)
Dept: CASE MANAGEMENT | Age: 89
End: 2025-02-03

## 2025-02-03 NOTE — PROGRESS NOTES
MUNIRA Follow-up Assessment    General:  Assessment completed with: Patient  Community Resources: Other (n/a)    Progress/Care Plan:  Is the patient progressing as planned?: Yes  Care Plan Update: Spoke with patient he is doing well. His only complaint is itching all over his body. He did reach out to his primary care physician last week and has follow up appointment. This has been ongoing for quite some time. RN @ primary care physician office did provide some recommendations, see nurse triage encounter dated 1/31/25. Patient reports the following blood pressure 114/58, blood sugar 83 and weight 98.6# these are from today. He denies shortness of breath and lower extremity. Patient did not have any additional questions or concerns.  New Care Plan: continue to monitor for shortness of breath/swelling in legs, monitor blood pressure/blood sugar/weight  Frequency/Follow Up Plan: 1 week     Notes:  Navigator Notes: shortness of breath/swelling in leg, itching, blood pressure/blood sugar/weight

## 2025-02-11 ENCOUNTER — PATIENT OUTREACH (OUTPATIENT)
Dept: CASE MANAGEMENT | Age: 89
End: 2025-02-11

## 2025-02-12 NOTE — PROGRESS NOTES
MUNIRA Follow-up Assessment    General:  Assessment completed with: Patient  Community Resources: Other (n/a)    Progress/Care Plan:  Is the patient progressing as planned?: Yes  Care Plan Update: Spoke with patient he denies shortness of breath or swelling in legs.  He is still itching all over and has follow up appointment with primary care physician on 3/5.  His blood pressure today was 114/62, blood sugar 77 and weight 97#.  Patient denies any additional symptoms. Patient did not have any additional questions or concerns.  New Care Plan: monitor weight, blood pressure, blood sugar daily, monitor for shortness of breath and swelling in legs  Frequency/Follow Up Plan: 1 week     Notes:  Navigator Notes: weight, blood pressure, blood sugar daily, shortness of breath and swelling in legs, cardio visit 2/19

## 2025-02-19 ENCOUNTER — NURSE TRIAGE (OUTPATIENT)
Dept: INTERNAL MEDICINE CLINIC | Facility: CLINIC | Age: 89
End: 2025-02-19

## 2025-02-19 NOTE — TELEPHONE ENCOUNTER
No response yet from Dr. Rodriguez; paging TEE Westbrook [on-call] and also routing to her as well    TEE Westbrook - please advise

## 2025-02-19 NOTE — TELEPHONE ENCOUNTER
Action Requested: Summary for Provider     []  Critical Lab, Recommendations Needed  [x] Need Additional Advice  []   FYI    [x]   Need Orders  [] Need Medications Sent to Pharmacy  []  Other     SUMMARY: Patient calling today -   Low sugar 57 this morning. Was feeling weak, had Juice and had something sweet, sugar now 192    Last few days low as well   18th = 72 fasting AM  17th = 84 AM     Patient taking Glipizide-metformin 2.5-500 BID  Patient declined sooner visit then scheduled, will only see Dr. Rodriguez.     Please advise  ER warning signs reviewed and will go but feeling fine now, last few morning low sugar readings.       Reason for call: Blood Sugar  Onset: last few days and today 57 glucose reading     Not eating well decreased apatite, not eating as well as he used to  Had dental work done, teeth removed tried to fix but cannot, has not teeth, hard to eat and often eats soup.   Weight today 96.8  Tried to do ensure plus in afternoon most days.

## 2025-02-20 ENCOUNTER — PATIENT OUTREACH (OUTPATIENT)
Dept: CASE MANAGEMENT | Age: 89
End: 2025-02-20

## 2025-02-20 NOTE — TELEPHONE ENCOUNTER
Disregard below - on-call today is TEE Almonte --> paging and routing encounter    TEE Almonte: Please see below

## 2025-02-20 NOTE — TELEPHONE ENCOUNTER
Rec'd after hours page re: the below encounter.  Tried to call patient to recommend he decrease his dose of glipizide-metformin to once daily due to recent lower fasting blood glucose levels and decreased appetite. He verbalized understanding and will only take one dose in the morning and will continue to monitor his blood glucose.

## 2025-02-21 RX ORDER — AMLODIPINE BESYLATE 5 MG/1
5 TABLET ORAL DAILY
COMMUNITY

## 2025-02-21 RX ORDER — METOPROLOL SUCCINATE 100 MG/1
100 TABLET, EXTENDED RELEASE ORAL DAILY
COMMUNITY

## 2025-02-21 NOTE — PROGRESS NOTES
MUNIRA Follow-up Assessment    General:  Assessment completed with: Patient  Community Resources: Other (n/a)    Progress/Care Plan:  Is the patient progressing as planned?: No  Barriers: Other (hypotension and hypoglycemia episodes)  Care Plan Update: Spoke with patient he seen his cardiologist on 2/19 and medications were adjusted, amlodipine decreased to 5mg daily and metoprolol changed from tartrate to 100 mg daily of succinate.  Patient will have labs tomorrow and needs to schedule ultrasound or carotid arteries.  Changes were made due to patient have hypotension at times, feeling weak and fatigued.  He was also getting low blood sugar levels and  he was advised by primary care physician to decrease glipizide-metformin to once daily dosing in the morning.  Today his blood sugar was 97. He denies shortness of breath and swelling in legs.  Patient has not taken his blood pressure today since he took new dose of medications.  He will take it later this afternoon.  End of program was set for today but since patient is having hypotension and hypoglycemia will follow up with patient next week.  Patient agrees. Patient did not have any additional questions or concerns.  New Care Plan: begin new medication regimen, monitor blood pressure and blood sugar  Frequency/Follow Up Plan: 1 week     Notes:  Navigator Notes: blood pressure, blood sugar

## 2025-02-22 ENCOUNTER — LAB ENCOUNTER (OUTPATIENT)
Dept: LAB | Facility: HOSPITAL | Age: 89
End: 2025-02-22
Attending: STUDENT IN AN ORGANIZED HEALTH CARE EDUCATION/TRAINING PROGRAM
Payer: MEDICARE

## 2025-02-22 DIAGNOSIS — D50.8 OTHER IRON DEFICIENCY ANEMIA: ICD-10-CM

## 2025-02-22 DIAGNOSIS — N18.31 STAGE 3A CHRONIC KIDNEY DISEASE (HCC): ICD-10-CM

## 2025-02-22 DIAGNOSIS — I10 HTN (HYPERTENSION): Primary | ICD-10-CM

## 2025-02-22 DIAGNOSIS — E78.49 OTHER HYPERLIPIDEMIA: ICD-10-CM

## 2025-02-22 LAB
ANION GAP SERPL CALC-SCNC: 9 MMOL/L (ref 0–18)
BASOPHILS # BLD AUTO: 0.07 X10(3) UL (ref 0–0.2)
BASOPHILS NFR BLD AUTO: 0.7 %
BUN BLD-MCNC: 54 MG/DL (ref 9–23)
BUN/CREAT SERPL: 32.7 (ref 10–20)
CALCIUM BLD-MCNC: 9.5 MG/DL (ref 8.7–10.4)
CHLORIDE SERPL-SCNC: 103 MMOL/L (ref 98–112)
CO2 SERPL-SCNC: 28 MMOL/L (ref 21–32)
CREAT BLD-MCNC: 1.65 MG/DL
DEPRECATED HBV CORE AB SER IA-ACNC: 768 NG/ML
DEPRECATED RDW RBC AUTO: 44.3 FL (ref 35.1–46.3)
EGFRCR SERPLBLD CKD-EPI 2021: 40 ML/MIN/1.73M2 (ref 60–?)
EOSINOPHIL # BLD AUTO: 3.53 X10(3) UL (ref 0–0.7)
EOSINOPHIL NFR BLD AUTO: 34.1 %
ERYTHROCYTE [DISTWIDTH] IN BLOOD BY AUTOMATED COUNT: 14.4 % (ref 11–15)
FASTING STATUS PATIENT QL REPORTED: YES
GLUCOSE BLD-MCNC: 116 MG/DL (ref 70–99)
HCT VFR BLD AUTO: 32.4 %
HGB BLD-MCNC: 10.4 G/DL
IMM GRANULOCYTES # BLD AUTO: 0.02 X10(3) UL (ref 0–1)
IMM GRANULOCYTES NFR BLD: 0.2 %
IRON SATN MFR SERPL: 53 %
IRON SERPL-MCNC: 123 UG/DL
LYMPHOCYTES # BLD AUTO: 1.71 X10(3) UL (ref 1–4)
LYMPHOCYTES NFR BLD AUTO: 16.5 %
MCH RBC QN AUTO: 27.2 PG (ref 26–34)
MCHC RBC AUTO-ENTMCNC: 32.1 G/DL (ref 31–37)
MCV RBC AUTO: 84.6 FL
MONOCYTES # BLD AUTO: 0.83 X10(3) UL (ref 0.1–1)
MONOCYTES NFR BLD AUTO: 8 %
NEUTROPHILS # BLD AUTO: 4.19 X10 (3) UL (ref 1.5–7.7)
NEUTROPHILS # BLD AUTO: 4.19 X10(3) UL (ref 1.5–7.7)
NEUTROPHILS NFR BLD AUTO: 40.5 %
OSMOLALITY SERPL CALC.SUM OF ELEC: 306 MOSM/KG (ref 275–295)
PLATELET # BLD AUTO: 191 10(3)UL (ref 150–450)
POTASSIUM SERPL-SCNC: 4.2 MMOL/L (ref 3.5–5.1)
RBC # BLD AUTO: 3.83 X10(6)UL
SODIUM SERPL-SCNC: 140 MMOL/L (ref 136–145)
TOTAL IRON BINDING CAPACITY: 233 UG/DL (ref 250–425)
TRANSFERRIN SERPL-MCNC: 168 MG/DL (ref 215–365)
WBC # BLD AUTO: 10.4 X10(3) UL (ref 4–11)

## 2025-02-22 PROCEDURE — 85025 COMPLETE CBC W/AUTO DIFF WBC: CPT

## 2025-02-22 PROCEDURE — 84466 ASSAY OF TRANSFERRIN: CPT

## 2025-02-22 PROCEDURE — 83540 ASSAY OF IRON: CPT

## 2025-02-22 PROCEDURE — 80048 BASIC METABOLIC PNL TOTAL CA: CPT

## 2025-02-22 PROCEDURE — 36415 COLL VENOUS BLD VENIPUNCTURE: CPT

## 2025-02-22 PROCEDURE — 82728 ASSAY OF FERRITIN: CPT

## 2025-02-24 ENCOUNTER — TELEPHONE (OUTPATIENT)
Age: 89
End: 2025-02-24

## 2025-02-24 NOTE — TELEPHONE ENCOUNTER
Called Mohamud, per Dr. Caruso, Hgb better at 10.4, iron studies elevated from recent IV iron, repeat CBC/iron studies in 6 weeks.Rescheduled MD and lab appointment, he is aware of the new appointment times. He thanked me for the call.

## 2025-02-28 ENCOUNTER — PATIENT OUTREACH (OUTPATIENT)
Dept: CASE MANAGEMENT | Age: 89
End: 2025-02-28

## 2025-02-28 NOTE — PROGRESS NOTES
Dear Priyank Rodriguez MD,    My name is Rebecca Solitario RN and I am this patient's Transition of Care Navigator.     Thank you for allowing me to participate in your patient's care over the past 30 days.     The goal of this program is to assist individuals in meeting their health care needs post discharge.  This is done by providing consistent care coordination and connecting patients with needed resources throughout this episode of care. I'd like to inform you that your patient Mohamud Fitzpatrick has completed the 30 day MUNIRA navigation program as of today.     In discussion with the patient/family contact, I reviewed their providers and ongoing support contacts for the future.     Areas of need:  monitoring of blood sugar and blood pressure  Areas of progress: shortness of breath and leg swelling resolved, completed visit with cardiology         It has been a pleasure communicating and working with you and thank you for allowing me to participate in the care of your patient.     If for any reason you have questions about the program, please feel free to contact me at the information below.      Sincerely,    Rebecca Solitario RN      MUNIRA Graduation Assessment    General:  Assessment completed with: Patient  Community Resources: Other (n/a)    Care Plan/Instructions:   Care Plan Summary (Recap of navigation period including # of ED & Hospital Admission, and if goals met or unmet): Spoke with patient he is feeling better than he was last week.  Now his blood sugars are high.  This morning fasting it was 157.  His blood pressure has also been higher but in the normal range.  He has an appointment with primary care physician on 3/5.  He denies shortness of breath or leg swelling.  Goals have been met, therefore TRANSITIONS OF CARE will end.  This will be last outreach call from nurse care manager.  Patient is in agreement. Primary care physician updated.  Navigation period 1/17/25 - 2/28/25.  One admission  on  1/12/25-1/16/25 that initiated MUNIRA.  Goals have been met.  Patient Graduation Instructions (Ongoing barriers to care identified, Areas of Need, Areas of Progress): Barriers:  none  Needs:  monitoring of blood sugar and blood pressure   Progress:  shortness of breath and leg swelling resolved, completed visit with cardiology

## 2025-03-05 ENCOUNTER — OFFICE VISIT (OUTPATIENT)
Dept: INTERNAL MEDICINE CLINIC | Facility: CLINIC | Age: 89
End: 2025-03-05

## 2025-03-05 VITALS
DIASTOLIC BLOOD PRESSURE: 70 MMHG | RESPIRATION RATE: 18 BRPM | WEIGHT: 98 LBS | SYSTOLIC BLOOD PRESSURE: 114 MMHG | HEIGHT: 60 IN | TEMPERATURE: 98 F | BODY MASS INDEX: 19.24 KG/M2 | HEART RATE: 66 BPM

## 2025-03-05 DIAGNOSIS — I50.30 DIASTOLIC CONGESTIVE HEART FAILURE, UNSPECIFIED HF CHRONICITY (HCC): ICD-10-CM

## 2025-03-05 DIAGNOSIS — L29.9 ITCHY SKIN: ICD-10-CM

## 2025-03-05 DIAGNOSIS — I10 ESSENTIAL HYPERTENSION: ICD-10-CM

## 2025-03-05 DIAGNOSIS — E11.9 TYPE 2 DIABETES MELLITUS WITHOUT COMPLICATION, WITHOUT LONG-TERM CURRENT USE OF INSULIN (HCC): Primary | ICD-10-CM

## 2025-03-05 PROCEDURE — 3074F SYST BP LT 130 MM HG: CPT | Performed by: INTERNAL MEDICINE

## 2025-03-05 PROCEDURE — 3008F BODY MASS INDEX DOCD: CPT | Performed by: INTERNAL MEDICINE

## 2025-03-05 PROCEDURE — 1159F MED LIST DOCD IN RCRD: CPT | Performed by: INTERNAL MEDICINE

## 2025-03-05 PROCEDURE — 1170F FXNL STATUS ASSESSED: CPT | Performed by: INTERNAL MEDICINE

## 2025-03-05 PROCEDURE — 1126F AMNT PAIN NOTED NONE PRSNT: CPT | Performed by: INTERNAL MEDICINE

## 2025-03-05 PROCEDURE — 1160F RVW MEDS BY RX/DR IN RCRD: CPT | Performed by: INTERNAL MEDICINE

## 2025-03-05 PROCEDURE — G2211 COMPLEX E/M VISIT ADD ON: HCPCS | Performed by: INTERNAL MEDICINE

## 2025-03-05 PROCEDURE — 99214 OFFICE O/P EST MOD 30 MIN: CPT | Performed by: INTERNAL MEDICINE

## 2025-03-05 PROCEDURE — 3078F DIAST BP <80 MM HG: CPT | Performed by: INTERNAL MEDICINE

## 2025-03-05 RX ORDER — METFORMIN HYDROCHLORIDE 500 MG/1
500 TABLET, EXTENDED RELEASE ORAL DAILY
Qty: 90 TABLET | Refills: 1 | Status: SHIPPED | OUTPATIENT
Start: 2025-03-05

## 2025-03-05 RX ORDER — LOSARTAN POTASSIUM 100 MG/1
100 TABLET ORAL DAILY
Qty: 90 TABLET | Refills: 1 | Status: SHIPPED | OUTPATIENT
Start: 2025-03-05

## 2025-03-05 NOTE — PROGRESS NOTES
HPI:    Patient ID: Mohamud Fitzpatrick is a 89 year old male.    HPI    Patient returns to the office today to discuss chronic medical issues as listed on the active problem list below.  Patient last seen in the office by me on Agueda 3 for checkup and cough.  During the last visit, the following changes were made: None.  Since the last visit, the patient has seen the following doctors: Pulmonary service for the cough.  Placed on Levaquin and prednisone with significant improvement.  He has also been following with hematology oncology and getting IV iron infusions for his iron deficiency anemia.  Also seen cardiology.  Patient was in the hospital January 12 through 16 with congestive heart failure with preserved ejection fraction as well as CAD.  Changes were made in medications including the metoprolol 100 mg going down to 25 mg twice a day.  Also the furosemide 40 twice a day and the losartan 100 a day. Then saw the Cardiologist a couple weeks ago; he changed the metoprolol from the tartrate back to the succinate; also decreased dose of amlodipine to 5 mg; also increased the furosemide 40 mg from one a day to two a day. Labs done 2/22 showed GFR of 40 mg; now told to skip lasix for 2 days and then go to lasix 40 mg once a day.     Today, the patient offers the following complaints: fine for an old leni. He itches all over; going on for years; he uses Eucerin and other creams, that helps a little in the legs; he wakes up itching.  Patient does check sugar at home. It has been running 138 to 168 in the AM; higher later in the day. Last A1C in Jan was 6.2.On 2/19, we decreased the glipizide metformin from 2 a day to one a day.   Patient does check blood pressure at home. It has been running /53-80.  Patient describes diet as so-so. Appetite is not good. He is not eating very well. He is getting new dentures.   For exercise, the patient is not very active. He gets tired easily.   Tobacco and alcohol use  reviewed.   Current medications reviewed.   Health maintenance issues reviewed.    Wt Readings from Last 6 Encounters:   01/17/25 105 lb 4.8 oz (47.8 kg)   02/14/25 97 lb (44 kg)   01/09/25 114 lb (51.7 kg)   12/13/24 109 lb 12.8 oz (49.8 kg)   10/29/24 104 lb 6.4 oz (47.4 kg)   10/15/24 104 lb (47.2 kg)       Patient Active Problem List   Diagnosis    Type 2 diabetes mellitus without complication, without long-term current use of insulin (HCC)    Coronary artery disease involving native coronary artery of native heart without angina pectoris    Essential hypertension    Gastroesophageal reflux disease    Bilateral carotid artery stenosis    RBBB (right bundle branch block)    Primary cerebellar degeneration (HCC)    Other iron deficiency anemia    Abnormal CT of the chest    Subacute cough    Type 2 diabetes mellitus with hypoglycemia without coma, without long-term current use of insulin (HCC)    Type 2 diabetes mellitus with hyperglycemia, without long-term current use of insulin (HCC)    Stage 3a chronic kidney disease (HCC)    Congestive heart failure, unspecified HF chronicity, unspecified heart failure type (HCC)        HISTORY:  Past Medical History:    Acute congestive heart failure, unspecified heart failure type (HCC)    Atherosclerosis of coronary artery    Congestive heart disease (HCC)    Coronary atherosclerosis    Diabetes (HCC)    Essential hypertension    Former smoker    High blood pressure    History of quadruple bypass    Pressure ulcer      Past Surgical History:   Procedure Laterality Date    Cabg  05/2009    Carotid endarterectomy      Colonoscopy      Other surgical history        Family History   Problem Relation Age of Onset    No Known Problems Father     Other (Other) Mother     Heart Disorder Brother       Social History     Socioeconomic History    Marital status:    Tobacco Use    Smoking status: Former     Passive exposure: Past    Smokeless tobacco: Never   Vaping Use     Vaping status: Never Used   Substance and Sexual Activity    Alcohol use: Never    Drug use: Never    Sexual activity: Not Currently     Partners: Female     Social Drivers of Health     Food Insecurity: No Food Insecurity (1/12/2025)    Food Insecurity     Food Insecurity: Never true   Transportation Needs: No Transportation Needs (1/17/2025)    Transportation Needs     Lack of Transportation: No   Housing Stability: Low Risk  (1/12/2025)    Housing Stability     Housing Instability: No          Review of Systems          Current Outpatient Medications   Medication Sig Dispense Refill    amLODIPine 5 MG Oral Tab Take 1 tablet (5 mg total) by mouth daily.      metoprolol succinate  MG Oral Tablet 24 Hr Take 1 tablet (100 mg total) by mouth daily.      furosemide 40 MG Oral Tab Take 1 tablet (40 mg total) by mouth BID (Diuretic). 60 tablet 1    losartan 100 MG Oral Tab Take 1 tablet (100 mg total) by mouth daily. 30 tablet 1    allopurinol 300 MG Oral Tab Take 1 tablet (300 mg total) by mouth daily. 90 tablet 1    clopidogrel 75 MG Oral Tab Take 1 tablet (75 mg total) by mouth daily. 90 tablet 1    glipiZIDE-metFORMIN HCl 2.5-500 MG Oral Tab Take 1 tablet by mouth 2 (two) times daily with meals. (Patient taking differently: Take 1 tablet by mouth every morning.) 180 tablet 3    Glucose Blood (ACCU-CHEK SMARTVIEW) In Vitro Strip Use 1 (one) new strip twice daily for blood glucose monitoring 200 strip 3    TRUEplus Lancets 33G Does not apply Misc 1 Lancet by Finger stick route 2 (two) times daily. 200 each 3    STIMULANT LAXATIVE 8.6-50 MG Oral Tab Take 1 tablet by mouth daily. 90 tablet 3    terazosin 2 MG Oral Cap Take 1 capsule (2 mg total) by mouth nightly. 90 capsule 3    Neomycin-Polymyxin-Dexameth 3.5-76492-7.1 Ophthalmic Suspension 2 (two) times daily.      triamcinolone 0.1 % External Cream Apply topically 2 (two) times daily as needed. 45 g 3    cyanocobalamine 1000 MCG Oral Tab Take 1 tablet (1,000 mcg  total) by mouth daily. 30 tablet 0    atorvastatin 40 MG Oral Tab Take 1 tablet (40 mg total) by mouth nightly.      Glucose Blood In Vitro Strip Check glucose once daily      Dorzolamide HCl-Timolol Mal 22.3-6.8 MG/ML Ophthalmic Solution Place 1 drop into both eyes 2 (two) times daily.      LUMIGAN 0.01 % Ophthalmic Solution Place 1 drop into both eyes every evening.       Allergies:Allergies[1]     PHYSICAL EXAM:   There were no vitals taken for this visit.     Physical Exam  Constitutional:       Appearance: Normal appearance. He is well-developed.   Eyes:      Conjunctiva/sclera: Conjunctivae normal.   Neck:      Vascular: No carotid bruit.   Cardiovascular:      Rate and Rhythm: Normal rate and regular rhythm.      Pulses: Normal pulses.      Heart sounds: Normal heart sounds. No murmur heard.  Pulmonary:      Effort: Pulmonary effort is normal.      Breath sounds: Normal breath sounds. No wheezing or rales.   Abdominal:      General: Bowel sounds are normal.      Palpations: Abdomen is soft. There is no mass.      Tenderness: There is no abdominal tenderness.   Musculoskeletal:      Right lower leg: No edema.      Left lower leg: No edema.   Lymphadenopathy:      Cervical: No cervical adenopathy.   Skin:     General: Skin is warm and dry.      Findings: No rash.   Neurological:      General: No focal deficit present.      Mental Status: He is alert.   Psychiatric:         Mood and Affect: Mood normal.         Behavior: Behavior normal.         Thought Content: Thought content normal.                 ASSESSMENT/PLAN:   1. Type 2 diabetes mellitus without complication, without long-term current use of insulin (HCC)  Diabetes is controlled but he may be overmedicated.  We will stop the glipizide metformin combination instead replace with metformin  mg once a day.  Keep checking sugars.  Follow-up in 1 to 3 months for recheck.  Call if sugars are too high.    2. Diastolic congestive heart failure,  unspecified HF chronicity (HCC)  Recently hospitalized in January.  He just saw cardiology.  Changes are being made in his medications.  Blood pressure is controlled.  No evidence of volume overload.  Recent increase in creatinine caused cardiology to pull back on the diuretics.  Continue with their plan.    3. Essential hypertension  Blood pressure is adequately controlled.  Continue current treatment.    4. Itchy skin  Chronic itchy skin.  Seems worse now.  Patient cannot recall any specific pattern as far as time or other factors.  Refer to dermatology.  Already placed at some point on triamcinolone cream.  - [facility] [place] [facility]         [facility] This Visit:  Requested Prescriptions      No prescriptions requested or ordered in this encounter       Imaging & Referrals:  None         Priyank Rodriguez MD          [1] No Known Allergies

## 2025-03-06 RX ORDER — FERROUS SULFATE 325(65) MG
1 TABLET, DELAYED RELEASE (ENTERIC COATED) ORAL
Qty: 39 TABLET | Refills: 3 | Status: SHIPPED | OUTPATIENT
Start: 2025-03-07

## 2025-03-06 NOTE — TELEPHONE ENCOUNTER
Protocol Failed/ No Protocol    Requested Prescriptions   Pending Prescriptions Disp Refills    FERROUS SULFATE 325 (65 FE) MG Oral Tab EC [Pharmacy Med Name: Ferrous Sulfate Oral Tablet Delayed Release 325 (65 Fe) MG] 39 tablet 3     Sig: TAKE 1 TABLET (325 MG TOTAL) BY MOUTH 3 (THREE) TIMES A WEEK.       There is no refill protocol information for this order          Future Appointments         Provider Department Appt Notes    In 1 week Dae Solano MD [facility] skin check    In 1 month Priyank Rodriguez MD [facility]     In 1 month CMA RESOURCE Sharmila ALVARENGA Counts include 234 beds at the [facility]ematology Oncology [place]     In 1 month Juanito Caruso MD [facility] include 234 beds at the [facility]ematology [facility] [place]     In 7 months Sandip Pérez MD [facility] yearly          Recent Outpatient Visits              Today Type 2 diabetes mellitus without complication, without long-term current use of insulin (HCC)    [facility] Priyank Rodriguez MD    Office Visit    1 month ago Other iron deficiency anemia    Sharmila ALVARENGA [facility]    Office Visit    1 month ago Other iron deficiency anemia    Sharmila ALVARENGA [facility]    Office Visit    2 months ago Other iron deficiency anemia    [facility] Hematology Oncology Juanito Caruso MD    Office Visit    4 months ago Other iron deficiency anemia    Sharmila ALVARENGA [facility]    Office Visit

## 2025-03-11 RX ORDER — TERAZOSIN 2 MG/1
2 CAPSULE ORAL NIGHTLY
Qty: 90 CAPSULE | Refills: 3 | Status: SHIPPED | OUTPATIENT
Start: 2025-03-11

## 2025-03-12 ENCOUNTER — OFFICE VISIT (OUTPATIENT)
Dept: DERMATOLOGY CLINIC | Facility: CLINIC | Age: 89
End: 2025-03-12

## 2025-03-12 ENCOUNTER — NURSE ONLY (OUTPATIENT)
Dept: DERMATOLOGY CLINIC | Facility: CLINIC | Age: 89
End: 2025-03-12

## 2025-03-12 DIAGNOSIS — L29.89 UREMIC PRURITUS: Primary | ICD-10-CM

## 2025-03-12 DIAGNOSIS — L29.89 UREMIC PRURITUS: ICD-10-CM

## 2025-03-12 PROCEDURE — 96900 ACTINOTHERAPY UV LIGHT: CPT | Performed by: STUDENT IN AN ORGANIZED HEALTH CARE EDUCATION/TRAINING PROGRAM

## 2025-03-12 PROCEDURE — 1159F MED LIST DOCD IN RCRD: CPT | Performed by: STUDENT IN AN ORGANIZED HEALTH CARE EDUCATION/TRAINING PROGRAM

## 2025-03-12 PROCEDURE — 99204 OFFICE O/P NEW MOD 45 MIN: CPT | Performed by: STUDENT IN AN ORGANIZED HEALTH CARE EDUCATION/TRAINING PROGRAM

## 2025-03-12 PROCEDURE — 1160F RVW MEDS BY RX/DR IN RCRD: CPT | Performed by: STUDENT IN AN ORGANIZED HEALTH CARE EDUCATION/TRAINING PROGRAM

## 2025-03-12 PROCEDURE — 1126F AMNT PAIN NOTED NONE PRSNT: CPT | Performed by: STUDENT IN AN ORGANIZED HEALTH CARE EDUCATION/TRAINING PROGRAM

## 2025-03-12 NOTE — PROGRESS NOTES
New patient     Referred by:   Priyank Rodriguez MD     CHIEF COMPLAINT: Rash     HISTORY OF PRESENT ILLNESS: Mohamud Fitzpatrick is a 89 year old male here for evaluation of rash.    Location: All over body  Duration: Years   Improving, worsening, or stable?: Worsening  Scaly?:No    Itchy?:Yes  Current treatment: CereVe  Past treatments: CereVe    Personal Dermatologic History  History of chronic skin disease:No    Family History  History autoimmune diseases:  No    Past Medical History  Past Medical History:    Acute congestive heart failure, unspecified heart failure type (HCC)    Atherosclerosis of coronary artery    Congestive heart disease (HCC)    Coronary atherosclerosis    Diabetes (HCC)    Essential hypertension    Former smoker    High blood pressure    History of quadruple bypass    Pressure ulcer       REVIEW OF SYSTEMS:  Constitutional: Denies fever, chills, unintentional weight loss.   Skin as per HPI    Medications  Current Outpatient Medications   Medication Sig Dispense Refill    TERAZOSIN 2 MG Oral Cap TAKE 1 CAPSULE BY MOUTH EVERY DAY AT NIGHT 90 capsule 3    ferrous sulfate 325 (65 FE) MG Oral Tab EC Take 1 tablet (325 mg total) by mouth 3 (three) times a week. 39 tablet 3    losartan 100 MG Oral Tab Take 1 tablet (100 mg total) by mouth daily. 90 tablet 1    metFORMIN  MG Oral Tablet 24 Hr Take 1 tablet (500 mg total) by mouth daily. 90 tablet 1    amLODIPine 5 MG Oral Tab Take 1 tablet (5 mg total) by mouth daily.      metoprolol succinate  MG Oral Tablet 24 Hr Take 1 tablet (100 mg total) by mouth daily.      furosemide 40 MG Oral Tab Take 1 tablet (40 mg total) by mouth BID (Diuretic). 60 tablet 1    allopurinol 300 MG Oral Tab Take 1 tablet (300 mg total) by mouth daily. 90 tablet 1    clopidogrel 75 MG Oral Tab Take 1 tablet (75 mg total) by mouth daily. 90 tablet 1    Glucose Blood (ACCU-CHEK SMARTVIEW) In Vitro Strip Use 1 (one) new strip twice daily for blood glucose  monitoring 200 strip 3    TRUEplus Lancets 33G Does not apply Misc 1 Lancet by Finger stick route 2 (two) times daily. 200 each 3    STIMULANT LAXATIVE 8.6-50 MG Oral Tab Take 1 tablet by mouth daily. 90 tablet 3    Neomycin-Polymyxin-Dexameth 3.5-86509-8.1 Ophthalmic Suspension 2 (two) times daily.      triamcinolone 0.1 % External Cream Apply topically 2 (two) times daily as needed. 45 g 3    cyanocobalamine 1000 MCG Oral Tab Take 1 tablet (1,000 mcg total) by mouth daily. 30 tablet 0    atorvastatin 40 MG Oral Tab Take 1 tablet (40 mg total) by mouth nightly.      Glucose Blood In Vitro Strip Check glucose once daily      Dorzolamide HCl-Timolol Mal 22.3-6.8 MG/ML Ophthalmic Solution Place 1 drop into both eyes 2 (two) times daily.      LUMIGAN 0.01 % Ophthalmic Solution Place 1 drop into both eyes every evening.         PHYSICAL EXAM:  General: awake, alert, no acute distress  Skin: Skin exam was performed today including the following: extremities. Pertinent findings include:   - trunk and extremities clear     ASSESSMENT & PLAN:  Pathophysiology of diagnoses discussed with patient.  Therapeutic options reviewed. Risks, benefits, and alternatives discussed with patient. Instructions reviewed at length.     #Uremic Pruritus  - Start Ultraviolet light therapy x2 weekly  - Recommended starting over the counter anti-itch product such as Sarna sensitive, Sarna, Eucerin itch relief, or Cerave itch.     Return to clinic:  8 weeks  or sooner if something concerning arises    Dae Solano MD    By signing my name below, Syl OH MA,  attest that this documentation has been prepared under the direction and in the presence of Dae Solano MD.   Electronically Signed: Syl GARCIA MA, 3/12/2025, 3:56 PM.    IDae MD,  personally performed the services described in this documentation. All medical record entries made by the scribe were at my direction and in my presence.  I have reviewed the chart and  agree that the record reflects my personal performance and is accurate and complete.  Dae Solano MD, 3/12/2025, 4:04 PM

## 2025-03-14 ENCOUNTER — NURSE ONLY (OUTPATIENT)
Dept: DERMATOLOGY CLINIC | Facility: CLINIC | Age: 89
End: 2025-03-14

## 2025-03-14 ENCOUNTER — APPOINTMENT (OUTPATIENT)
Age: 89
End: 2025-03-14
Attending: INTERNAL MEDICINE
Payer: MEDICARE

## 2025-03-14 DIAGNOSIS — L29.89 UREMIC PRURITUS: ICD-10-CM

## 2025-03-14 PROCEDURE — 96900 ACTINOTHERAPY UV LIGHT: CPT | Performed by: STUDENT IN AN ORGANIZED HEALTH CARE EDUCATION/TRAINING PROGRAM

## 2025-03-18 ENCOUNTER — NURSE ONLY (OUTPATIENT)
Dept: DERMATOLOGY CLINIC | Facility: CLINIC | Age: 89
End: 2025-03-18

## 2025-03-18 DIAGNOSIS — L29.89 UREMIC PRURITUS: ICD-10-CM

## 2025-03-18 PROCEDURE — 96900 ACTINOTHERAPY UV LIGHT: CPT | Performed by: STUDENT IN AN ORGANIZED HEALTH CARE EDUCATION/TRAINING PROGRAM

## 2025-03-18 RX ORDER — DOCUSATE SODIUM 50MG AND SENNOSIDES 8.6MG 8.6; 5 MG/1; MG/1
1 TABLET, FILM COATED ORAL DAILY
Qty: 90 TABLET | Refills: 3 | Status: SHIPPED | OUTPATIENT
Start: 2025-03-18

## 2025-03-19 ENCOUNTER — TELEPHONE (OUTPATIENT)
Dept: INTERNAL MEDICINE CLINIC | Facility: CLINIC | Age: 89
End: 2025-03-19

## 2025-03-19 NOTE — TELEPHONE ENCOUNTER
Dr Rodriguez, please advise    Patient (name and  verified) is calling with his blood sugar readings.     Last office visit 25    Blood sugar readings:  3/ fasting  3/ fasting  3/15 185-fasting  3/ after lunch  3/-irbcjpu  3/ afternoon lunch  3/ fasting  3/ afternoon  3/ fasting  3/ afternoon   3/ fasting    Future Appointments   Date Time Provider Department Center   3/21/2025  4:00 PM EC DERM RN & LIGHT [place] [facility]   2025  3:00 PM Priyank Rodriguez MD [facility]   2025  2:00 PM [facility]   2025  2:45 PM Juanito Caruso MD [facility] [place] [facility]   2025  4:20 PM Priyank Rodriguez MD [facility]   10/14/2025  3:45 PM Sandip Pérez MD [facility]

## 2025-03-21 ENCOUNTER — NURSE ONLY (OUTPATIENT)
Dept: DERMATOLOGY CLINIC | Facility: CLINIC | Age: 89
End: 2025-03-21

## 2025-03-21 DIAGNOSIS — L29.89 UREMIC PRURITUS: ICD-10-CM

## 2025-03-21 PROCEDURE — 96900 ACTINOTHERAPY UV LIGHT: CPT | Performed by: STUDENT IN AN ORGANIZED HEALTH CARE EDUCATION/TRAINING PROGRAM

## 2025-03-21 NOTE — TELEPHONE ENCOUNTER
Call patient.  I would continue the same dose of the metformin at this time.  I would not go back to the glipizide metformin combination.

## 2025-03-21 NOTE — TELEPHONE ENCOUNTER
Called and spoke with patient and identified full name and date of birth.  Relayed Dr. Rodriguez message and patient voiced understanding with treatment plan

## 2025-03-24 ENCOUNTER — TELEPHONE (OUTPATIENT)
Dept: INTERNAL MEDICINE CLINIC | Facility: CLINIC | Age: 89
End: 2025-03-24

## 2025-03-24 ENCOUNTER — TELEPHONE (OUTPATIENT)
Dept: PULMONOLOGY | Facility: CLINIC | Age: 89
End: 2025-03-24

## 2025-03-24 RX ORDER — LEVOFLOXACIN 500 MG/1
500 TABLET, FILM COATED ORAL DAILY
Qty: 7 TABLET | Refills: 0 | Status: SHIPPED | OUTPATIENT
Start: 2025-03-24

## 2025-03-24 RX ORDER — PREDNISONE 20 MG/1
TABLET ORAL
Qty: 10 TABLET | Refills: 0 | Status: SHIPPED | OUTPATIENT
Start: 2025-03-24

## 2025-03-24 NOTE — TELEPHONE ENCOUNTER
[See Care Everywhere for last office visit with [facility] on 2/19/25; patient had an office visit with PCP on 3/5/25 that referred to [facility] managing Rx]    Patient called states he had requested refill of Rx with pharmacy; out of Rx; the Rx was prescribed by APRN while admitted into the hospital on 1/12/25; discharged 1/16/25. Per last visit notes Rx is managed by cardiology/Dr. Shaw with [facility] [[facility] He has not contacted them yet. I advised that he contact [facility] for Rx refill. I advised that he call [facility] at [phone], call us back if he needs any assistance. Patient verbalized understanding. No further questions or concerns at this time.

## 2025-03-24 NOTE — TELEPHONE ENCOUNTER
Patient complains of productive cough, yellow/white sputum since 10/8/24. Denies any other symptoms. Requests prednisone and Levaquin.

## 2025-03-27 ENCOUNTER — TELEPHONE (OUTPATIENT)
Dept: INTERNAL MEDICINE CLINIC | Facility: CLINIC | Age: 89
End: 2025-03-27

## 2025-03-27 NOTE — TELEPHONE ENCOUNTER
Double the dose of metformin to 500 BID. Call if sugars not improving. I expect the glucose to be high while on prednisone and for a few days later. I would like to keep it at least under 300

## 2025-03-27 NOTE — TELEPHONE ENCOUNTER
Patient called (identified name and ),   He was prescribed Levoquin 500 mg daily x 7 days, and prednisone 20 mg ( 2 tablets per oral every day for 3 days, then take 1 tablet per oral every day for 4 days  by Dr Bradley  3/24/2025, for productive cough.    Takes metformin 500 mg once daily for diabetes.  States blood sugars running higher, up to 363 and 357.  Denies symptoms.  Dr Rodriguez, please advise if he needs diabetic meds adjusted while on prednisone?

## 2025-04-01 ENCOUNTER — NURSE TRIAGE (OUTPATIENT)
Dept: INTERNAL MEDICINE CLINIC | Facility: CLINIC | Age: 89
End: 2025-04-01

## 2025-04-01 ENCOUNTER — HOSPITAL ENCOUNTER (EMERGENCY)
Facility: HOSPITAL | Age: 89
Discharge: HOME | End: 2025-04-01
Attending: EMERGENCY MEDICINE
Payer: MEDICARE

## 2025-04-01 VITALS
OXYGEN SATURATION: 97 % | WEIGHT: 94.38 LBS | DIASTOLIC BLOOD PRESSURE: 53 MMHG | BODY MASS INDEX: 20 KG/M2 | RESPIRATION RATE: 20 BRPM | TEMPERATURE: 98 F | SYSTOLIC BLOOD PRESSURE: 131 MMHG | HEART RATE: 66 BPM

## 2025-04-01 DIAGNOSIS — R73.9 HYPERGLYCEMIA: Primary | ICD-10-CM

## 2025-04-01 LAB
ANION GAP SERPL CALC-SCNC: 11 MMOL/L (ref 0–18)
BASOPHILS # BLD AUTO: 0.01 X10(3) UL (ref 0–0.2)
BASOPHILS NFR BLD AUTO: 0.1 %
BUN BLD-MCNC: 51 MG/DL (ref 9–23)
BUN/CREAT SERPL: 40.2 (ref 10–20)
CALCIUM BLD-MCNC: 9.1 MG/DL (ref 8.7–10.4)
CHLORIDE SERPL-SCNC: 106 MMOL/L (ref 98–112)
CO2 SERPL-SCNC: 21 MMOL/L (ref 21–32)
CREAT BLD-MCNC: 1.27 MG/DL
DEPRECATED RDW RBC AUTO: 42.5 FL (ref 35.1–46.3)
EGFRCR SERPLBLD CKD-EPI 2021: 55 ML/MIN/1.73M2 (ref 60–?)
EOSINOPHIL # BLD AUTO: 0.14 X10(3) UL (ref 0–0.7)
EOSINOPHIL NFR BLD AUTO: 1.1 %
ERYTHROCYTE [DISTWIDTH] IN BLOOD BY AUTOMATED COUNT: 14.5 % (ref 11–15)
GLUCOSE BLD-MCNC: 182 MG/DL (ref 70–99)
GLUCOSE BLDC GLUCOMTR-MCNC: 193 MG/DL (ref 70–99)
HCT VFR BLD AUTO: 30.3 %
HGB BLD-MCNC: 9.8 G/DL
IMM GRANULOCYTES # BLD AUTO: 0.05 X10(3) UL (ref 0–1)
IMM GRANULOCYTES NFR BLD: 0.4 %
LYMPHOCYTES # BLD AUTO: 0.88 X10(3) UL (ref 1–4)
LYMPHOCYTES NFR BLD AUTO: 7.1 %
MCH RBC QN AUTO: 26.2 PG (ref 26–34)
MCHC RBC AUTO-ENTMCNC: 32.3 G/DL (ref 31–37)
MCV RBC AUTO: 81 FL
MONOCYTES # BLD AUTO: 0.98 X10(3) UL (ref 0.1–1)
MONOCYTES NFR BLD AUTO: 7.9 %
NEUTROPHILS # BLD AUTO: 10.42 X10 (3) UL (ref 1.5–7.7)
NEUTROPHILS # BLD AUTO: 10.42 X10(3) UL (ref 1.5–7.7)
NEUTROPHILS NFR BLD AUTO: 83.4 %
OSMOLALITY SERPL CALC.SUM OF ELEC: 304 MOSM/KG (ref 275–295)
PLATELET # BLD AUTO: 177 10(3)UL (ref 150–450)
POTASSIUM SERPL-SCNC: 3.8 MMOL/L (ref 3.5–5.1)
RBC # BLD AUTO: 3.74 X10(6)UL
SODIUM SERPL-SCNC: 138 MMOL/L (ref 136–145)
WBC # BLD AUTO: 12.5 X10(3) UL (ref 4–11)

## 2025-04-01 PROCEDURE — 82962 GLUCOSE BLOOD TEST: CPT

## 2025-04-01 PROCEDURE — 99284 EMERGENCY DEPT VISIT MOD MDM: CPT

## 2025-04-01 PROCEDURE — 96360 HYDRATION IV INFUSION INIT: CPT

## 2025-04-01 PROCEDURE — 80048 BASIC METABOLIC PNL TOTAL CA: CPT | Performed by: EMERGENCY MEDICINE

## 2025-04-01 PROCEDURE — 85025 COMPLETE CBC W/AUTO DIFF WBC: CPT | Performed by: EMERGENCY MEDICINE

## 2025-04-01 NOTE — TELEPHONE ENCOUNTER
Action Requested: Summary for Provider     []  Critical Lab, Recommendations Needed  [] Need Additional Advice  [x]   FYI    []   Need Orders  [] Need Medications Sent to Pharmacy  []  Other     SUMMARY: states his blood sugar is 522 - no vision changes, headache, shorness of breath. He feels weak. advised he go to the ER to be seen. He will call a ride to take him.     Reason for call: Hyperglycemia  Onset: today    The patient called stating that his blood sugar this morning was 142 and this afternoon he checked it at 4 pm and it said it was 502. He checked it while on the phone with me to ensure an accurate reading - it was 522. He is not having any difficulty breathing, no headache or blurry vision, he states he feels weak. Advised him to be seen in the ER now. He states his daughter us on vacation and he is going to call Buffy, a friend, to take him. Informed him we would call back in a few minutes to ensure he went.       Reason for Disposition   Blood glucose > 500 mg/dL (27.8 mmol/L)    Protocols used: Diabetes - High Blood Sugar-A-OH

## 2025-04-01 NOTE — TELEPHONE ENCOUNTER
Called the patient and spoke to him. He states corrie lives far away and she is on her way to take him.

## 2025-04-01 NOTE — ED INITIAL ASSESSMENT (HPI)
Elevated glucose in 400-500's on home monitor    Completed abx and prednisone for cough    Glucose 193 here     Also c/o light head pressure to front of head.

## 2025-04-01 NOTE — ED PROVIDER NOTES
Patient Seen in: [facility] Emergency Department      History     Chief Complaint   Patient presents with    Hyperglycemia     Stated Complaint: Hyperglycemia    Subjective:   HPI      89-year-old female presents for evaluation of hyperglycemia.  Patient reports upon awakening this morning his blood sugar was 142.  This evening he checked it and it was 502.  He called his primary care doctor's office and rechecked on the phone with the nurse, sugar was 522.  He states he has not had any medications between then and presenting to the ER, in triage his glucose was noted to be 193.  He was on antibiotics and steroids for an infection, took his last dose today, does not have any remaining doses of steroids to take.  States in the previous days he had some GI upset and had vomited, no vomiting today.  No fever.  Prior to checking his blood sugar states he had yogurt and diet 7-Up.    Objective:     Past Medical History:    Acute congestive heart failure, unspecified heart failure type (HCC)    Atherosclerosis of coronary artery    Congestive heart disease (HCC)    Coronary atherosclerosis    Diabetes (HCC)    Essential hypertension    Former smoker    High blood pressure    History of quadruple bypass    Pressure ulcer              Past Surgical History:   Procedure Laterality Date    Cabg  05/2009    Carotid endarterectomy      Colonoscopy      Other surgical history                  Social History     Socioeconomic History    Marital status:    Tobacco Use    Smoking status: Former     Passive exposure: Past    Smokeless tobacco: Never   Vaping Use    Vaping status: Never Used   Substance and Sexual Activity    Alcohol use: Never    Drug use: Never    Sexual activity: Not Currently     Partners: Female   Other Topics Concern    Reaction to local anesthetic No    Pt has a pacemaker No    Pt has a defibrillator No     Social Drivers of Health     Food Insecurity: No Food Insecurity (3/5/2025)    [facility] -  Food Insecurity     Worried About Running Out of Food in the Last Year: No     Ran Out of Food in the Last Year: No   Transportation Needs: No Transportation Needs (3/5/2025)    [facility]SS - Transportation     Lack of Transportation: No   Housing Stability: Not At Risk (3/5/2025)    NCSS - Housing/Utilities     Has Housing: Yes     Worried About Losing Housing: No     Unable to Get Utilities: No                  Physical Exam     ED Triage Vitals [04/01/25 1745]   /61   Pulse 74   Resp 18   Temp 97.7 °F (36.5 °C)   Temp src Temporal   SpO2 97 %   O2 Device None (Room air)       Current Vitals:   Vital Signs  BP: 131/53  Pulse: 66  Resp: 20  Temp: 97.7 °F (36.5 °C)  Temp src: Temporal  MAP (mmHg): 75    Oxygen Therapy  SpO2: 97 %  O2 Device: None (Room air)        Physical Exam  Vitals and nursing note reviewed.   Constitutional:       General: He is not in acute distress.     Appearance: Normal appearance. He is not toxic-appearing.   HENT:      Head: Normocephalic and atraumatic.   Eyes:      Conjunctiva/sclera: Conjunctivae normal.   Cardiovascular:      Rate and Rhythm: Normal rate and regular rhythm.   Pulmonary:      Effort: Pulmonary effort is normal. No respiratory distress.      Breath sounds: Normal breath sounds.   Abdominal:      Palpations: Abdomen is soft.      Tenderness: There is no abdominal tenderness.   Musculoskeletal:         General: Normal range of motion.      Cervical back: Normal range of motion and neck supple. No rigidity.   Neurological:      General: No focal deficit present.      Mental Status: He is alert.   Psychiatric:         Mood and Affect: Mood normal.             ED Course     Labs Reviewed   CBC WITH DIFFERENTIAL WITH PLATELET - Abnormal; Notable for the following components:       Result Value    WBC 12.5 (*)     RBC 3.74 (*)     HGB 9.8 (*)     HCT 30.3 (*)     Neutrophil Absolute Prelim 10.42 (*)     Neutrophil Absolute 10.42 (*)     Lymphocyte Absolute 0.88 (*)     All  other components within normal limits   BASIC METABOLIC PANEL (8) - Abnormal; Notable for the following components:    Glucose 182 (*)     BUN 51 (*)     BUN/CREA Ratio 40.2 (*)     Calculated Osmolality 304 (*)     eGFR-Cr 55 (*)     All other components within normal limits   POCT GLUCOSE - Abnormal; Notable for the following components:    POC Glucose  193 (*)     All other components within normal limits                   MDM              Medical Decision Making  Differential diagnosis includes but is not limited to DKA, HHS, side effect of steroids    Well-appearing patient, creatinine improved compared to previous, noted to have elevated BUN to creatinine ratio which is likely related to recent hyperglycemia.  Suspect most recent hyperglycemia secondary to prednisone which patient is no longer taking.  Discussed this with patient and daughter at the bedside.  Encouraged outpatient follow-up and discussed return precautions.  Both verbalized understanding of and agreement with this plan.    Problems Addressed:  Hyperglycemia: acute illness or injury    Amount and/or Complexity of Data Reviewed  Independent Historian:      Details: Daughter at the bedside providing additional history  External Data Reviewed: labs.     Details: Improvement in creatinine, mild leukocytosis today, otherwise CBC and BMP stable compared to 2/22/2025  Labs: ordered.    Risk  Prescription drug management.        Disposition and Plan     Clinical Impression:  1. Hyperglycemia         Disposition:  Discharge  4/1/2025  7:28 pm    Follow-up:  Priyank Rodriguez MD  [address] [address]  [phone]    Follow up  As needed    We recommend that you schedule follow up care with a primary care provider within the next three months to obtain basic health screening including reassessment of your blood pressure.      Medications Prescribed:  Discharge Medication List as of 4/1/2025  8:24 PM              Supplementary  Documentation:

## 2025-04-02 ENCOUNTER — PATIENT OUTREACH (OUTPATIENT)
Dept: CASE MANAGEMENT | Age: 89
End: 2025-04-02

## 2025-04-02 NOTE — PROGRESS NOTES
Transitions of Care Navigation  Discharge Date: 25  Contact Date: 2025    Transitions of Care Assessment:  MUNIRA Initial Assessment    General:  Assessment completed with: Patient  Patient Subjective: My sugar was 92 this morning. I still feel weak. I had nausea this morning but it went away.  Chief Complaint: Hyperglycemia  Verify patient name and  with patient/ caregiver: Yes    Hospital Stay/Discharge:  Tell me what you understand of why you were in the hospital or emergency department: high blood sugar  Prior to leaving the hospital were your Discharge Instructions reviewed with you?: Yes  Did you receive a copy of your written Discharge Instructions?: Yes  What questions do you have about your Discharge Instructions?: none  Do you feel better or worse since you left the hospital or emergency department?: Better    Follow - Up Appointment:  Do you have a follow-up appointment?: Yes  Date: 25  Physician: PCP  Are there any barriers to getting to your follow-up appointment?: No    Home Health/DME:  Prior to leaving the hospital was Home Health (HH) arranged for you?: No     Prior to leaving the hospital or emergency department was Durable Medical Equipment (DME), medical supplies, or infusions arranged for you?: No  Are DME/medical supply/infusions needs identified by staff during this assessment?: No     Medications/Diet:  Did any of your medications change, during or after your hospital stay or ED visit?: No  Do you understand what your medications are for and possible side effects?: Yes  Are there any reasons that keep you from taking your medication as prescribed?: No  Any concerns about medication refills?: No    Were you given a different diet per your Discharge Instructions?: No     Questions/Concerns:  Do you have any questions or concerns that have not been discussed?: No         Nursing Interventions: NCM reviewed discharge instructions and when to seek medical attention with the patient.  He states that he still feels weak. His blood sugar was 92 this morning, which he states is on the average side for his morning sugar. He states that he checks it again around 3p. He agreed to call [facility] back should his blood sugar be high again. He states that he had diarrhea this morning but states that diarrhea is his normal and he has had it for a long time. He denies that it is any worse. He does not check his bp. He denied having any fever, n/v, sob, pain, lightheadedness, HA or any new or worsening symptoms. He understands when to return to the ED. Med review completed. He denied having any questions or concerns at this time.     Medications:  Medication Reconciliation:  I am aware of an inpatient discharge within the last 30 days.  The discharge medication list has been reconciled with the patient's current medication list and reviewed by me. See medication list for additions of new medication, and changes to current doses of medications and discontinued medications.  Current Outpatient Medications   Medication Sig Dispense Refill    levoFLOXacin 500 MG Oral Tab Take 1 tablet (500 mg total) by mouth daily. 7 tablet 0    predniSONE 20 MG Oral Tab Take 2 tablets per oral every day for 3 days, then take 1 tablet per oral every day for 4 days 10 tablet 0    SENEXON-S 8.6-50 MG Oral Tab Take 1 tablet by mouth daily. 90 tablet 3    TERAZOSIN 2 MG Oral Cap TAKE 1 CAPSULE BY MOUTH EVERY DAY AT NIGHT 90 capsule 3    ferrous sulfate 325 (65 FE) MG Oral Tab EC Take 1 tablet (325 mg total) by mouth 3 (three) times a week. 39 tablet 3    losartan 100 MG Oral Tab Take 1 tablet (100 mg total) by mouth daily. 90 tablet 1    metFORMIN  MG Oral Tablet 24 Hr Take 1 tablet (500 mg total) by mouth daily. 90 tablet 1    amLODIPine 5 MG Oral Tab Take 1 tablet (5 mg total) by mouth daily.      metoprolol succinate  MG Oral Tablet 24 Hr Take 1 tablet (100 mg total) by mouth daily.      furosemide 40 MG Oral Tab Take 1  tablet (40 mg total) by mouth BID (Diuretic). 60 tablet 1    allopurinol 300 MG Oral Tab Take 1 tablet (300 mg total) by mouth daily. 90 tablet 1    clopidogrel 75 MG Oral Tab Take 1 tablet (75 mg total) by mouth daily. 90 tablet 1    Glucose Blood (ACCU-CHEK SMARTVIEW) In Vitro Strip Use 1 (one) new strip twice daily for blood glucose monitoring 200 strip 3    TRUEplus Lancets 33G Does not apply Misc 1 Lancet by Finger stick route 2 (two) times daily. 200 each 3    Neomycin-Polymyxin-Dexameth 3.5-99623-2.1 Ophthalmic Suspension 2 (two) times daily.      triamcinolone 0.1 % External Cream Apply topically 2 (two) times daily as needed. 45 g 3    cyanocobalamine 1000 MCG Oral Tab Take 1 tablet (1,000 mcg total) by mouth daily. 30 tablet 0    atorvastatin 40 MG Oral Tab Take 1 tablet (40 mg total) by mouth nightly.      Glucose Blood In Vitro Strip Check glucose once daily      Dorzolamide HCl-Timolol Mal 22.3-6.8 MG/ML Ophthalmic Solution Place 1 drop into both eyes 2 (two) times daily.      LUMIGAN 0.01 % Ophthalmic Solution Place 1 drop into both eyes every evening.           Follow-up Appointments:  Your appointments       Date & Time Appointment Department ([place])    Apr 04, 2025 3:00 PM CDT Nurse Visit with EC DERM RN & LIGHT TX [facility] ([facility])        Apr 07, 2025 3:00 PM CDT MA Supervisit with Priyank Rodriguez MD [facility] ([facility])        Apr 08, 2025 2:00 PM CDT Lab Visit with [facility] RESOURCE Sharmila ALVARENGA [facility] Hematology On[facility])        Apr 08, 2025 2:45 PM CDT HEMATOLOGY ONCOLOGY FOLLOW UP with Juanito Caruso MD Sharmila W. [facility])        Jun 11, 2025 4:20 PM CDT Follow up - Extended with Priyank Rodriguez MD [facility]  [facility] ([facility])        Oct 14, 2025 3:45 PM CDT Follow Up Visit with Sandip Pérez MD [facility], [facility] ([facility])              [facility], [facility]  [address]  [facility] [id number] [phone] [facility]  [address] [id number] [phone] [facility]  [address] [address] [phone]    Sharmila Harris [facility] Hematology Oncology [facility]  [address]  [place] [address]  [phone]            [facility]  Was TCC Ordered: No    Primary Care Provider (If no TCC appointment)  Does patient already have a PCP appointment scheduled? Yes, pt has an appt on 4/7 but he stated that he believes he has an appt with cardiologist that day and would need to reschedule. NCM offered to call his daughter Samina for him but he stated that she is on vacation and will be back tomorrow. He states that he will discuss the appt with her.       Specialist  Does the patient have any other follow-up appointment(s) need to be scheduled? No      [x]  Patient verbally agrees to additional follow-up calls from Nurse Care Manager    Book By Date: 4/8/25

## 2025-04-02 NOTE — DISCHARGE INSTRUCTIONS
Continue to take metformin as prescribed by your doctor.    Stay hydrated.    See primary care for any follow-up concerns.

## 2025-04-04 ENCOUNTER — NURSE ONLY (OUTPATIENT)
Dept: DERMATOLOGY CLINIC | Facility: CLINIC | Age: 89
End: 2025-04-04

## 2025-04-04 ENCOUNTER — TELEPHONE (OUTPATIENT)
Dept: CASE MANAGEMENT | Age: 89
End: 2025-04-04

## 2025-04-04 DIAGNOSIS — I25.119 ATHEROSCLEROSIS OF NATIVE CORONARY ARTERY OF NATIVE HEART WITH ANGINA PECTORIS: Primary | ICD-10-CM

## 2025-04-04 DIAGNOSIS — L29.89 UREMIC PRURITUS: ICD-10-CM

## 2025-04-04 PROCEDURE — 96900 ACTINOTHERAPY UV LIGHT: CPT | Performed by: STUDENT IN AN ORGANIZED HEALTH CARE EDUCATION/TRAINING PROGRAM

## 2025-04-04 NOTE — TELEPHONE ENCOUNTER
Dr Rodriguez, *    Patient called requesting referral to Dr. Shaw for an appointment 4/7/25.    Pended referral please review diagnosis and sign off if you agree.    Thank you.  Shameka Haile  [facility]

## 2025-04-07 ENCOUNTER — PATIENT OUTREACH (OUTPATIENT)
Dept: CASE MANAGEMENT | Age: 89
End: 2025-04-07

## 2025-04-08 ENCOUNTER — NURSE ONLY (OUTPATIENT)
Age: 89
End: 2025-04-08
Attending: INTERNAL MEDICINE
Payer: MEDICARE

## 2025-04-08 ENCOUNTER — OFFICE VISIT (OUTPATIENT)
Age: 89
End: 2025-04-08
Attending: INTERNAL MEDICINE
Payer: MEDICARE

## 2025-04-08 ENCOUNTER — OFFICE VISIT (OUTPATIENT)
Dept: INTERNAL MEDICINE CLINIC | Facility: CLINIC | Age: 89
End: 2025-04-08

## 2025-04-08 VITALS
HEART RATE: 45 BPM | BODY MASS INDEX: 20 KG/M2 | DIASTOLIC BLOOD PRESSURE: 70 MMHG | SYSTOLIC BLOOD PRESSURE: 122 MMHG | HEIGHT: 60 IN | OXYGEN SATURATION: 100 % | TEMPERATURE: 98 F

## 2025-04-08 VITALS
RESPIRATION RATE: 16 BRPM | HEART RATE: 58 BPM | HEIGHT: 60 IN | BODY MASS INDEX: 18.65 KG/M2 | DIASTOLIC BLOOD PRESSURE: 70 MMHG | TEMPERATURE: 96 F | SYSTOLIC BLOOD PRESSURE: 137 MMHG | WEIGHT: 95 LBS | OXYGEN SATURATION: 98 %

## 2025-04-08 DIAGNOSIS — N18.31 STAGE 3A CHRONIC KIDNEY DISEASE (HCC): ICD-10-CM

## 2025-04-08 DIAGNOSIS — D50.8 OTHER IRON DEFICIENCY ANEMIA: ICD-10-CM

## 2025-04-08 DIAGNOSIS — E11.9 TYPE 2 DIABETES MELLITUS WITHOUT COMPLICATION, WITHOUT LONG-TERM CURRENT USE OF INSULIN (HCC): Primary | ICD-10-CM

## 2025-04-08 DIAGNOSIS — D50.8 OTHER IRON DEFICIENCY ANEMIA: Primary | ICD-10-CM

## 2025-04-08 PROBLEM — R01.1 MURMUR: Status: ACTIVE | Noted: 2020-02-10

## 2025-04-08 PROBLEM — R06.02 SHORTNESS OF BREATH: Status: ACTIVE | Noted: 2025-04-08

## 2025-04-08 PROBLEM — R07.2 PRECORDIAL PAIN: Status: ACTIVE | Noted: 2025-04-08

## 2025-04-08 PROBLEM — D64.9 ANEMIA, UNSPECIFIED: Status: ACTIVE | Noted: 2021-10-08

## 2025-04-08 PROBLEM — E78.00 PURE HYPERCHOLESTEROLEMIA: Status: ACTIVE | Noted: 2017-11-29

## 2025-04-08 PROBLEM — E78.49 OTHER HYPERLIPIDEMIA: Status: ACTIVE | Noted: 2020-02-10

## 2025-04-08 PROBLEM — I73.9 PAD (PERIPHERAL ARTERY DISEASE): Status: ACTIVE | Noted: 2019-01-16

## 2025-04-08 PROBLEM — G45.9 TRANSIENT ISCHEMIC ATTACK: Status: ACTIVE | Noted: 2025-04-08

## 2025-04-08 PROBLEM — R42 DIZZINESS AND GIDDINESS: Status: ACTIVE | Noted: 2025-04-08

## 2025-04-08 PROBLEM — I49.49 PREMATURE BEATS: Status: ACTIVE | Noted: 2025-04-08

## 2025-04-08 LAB
BASOPHILS # BLD AUTO: 0.03 X10(3) UL (ref 0–0.2)
BASOPHILS NFR BLD AUTO: 0.2 %
DEPRECATED HBV CORE AB SER IA-ACNC: 652 NG/ML
DEPRECATED RDW RBC AUTO: 42 FL (ref 35.1–46.3)
EOSINOPHIL # BLD AUTO: 0.57 X10(3) UL (ref 0–0.7)
EOSINOPHIL NFR BLD AUTO: 4.6 %
ERYTHROCYTE [DISTWIDTH] IN BLOOD BY AUTOMATED COUNT: 14.3 % (ref 11–15)
HCT VFR BLD AUTO: 30.4 %
HGB BLD-MCNC: 9.9 G/DL
IMM GRANULOCYTES # BLD AUTO: 0.07 X10(3) UL (ref 0–1)
IMM GRANULOCYTES NFR BLD: 0.6 %
IRON SATN MFR SERPL: 17 %
IRON SERPL-MCNC: 34 UG/DL
LYMPHOCYTES # BLD AUTO: 1.14 X10(3) UL (ref 1–4)
LYMPHOCYTES NFR BLD AUTO: 9.3 %
MCH RBC QN AUTO: 26.4 PG (ref 26–34)
MCHC RBC AUTO-ENTMCNC: 32.6 G/DL (ref 31–37)
MCV RBC AUTO: 81.1 FL
MONOCYTES # BLD AUTO: 1.04 X10(3) UL (ref 0.1–1)
MONOCYTES NFR BLD AUTO: 8.5 %
NEUTROPHILS # BLD AUTO: 9.44 X10 (3) UL (ref 1.5–7.7)
NEUTROPHILS # BLD AUTO: 9.44 X10(3) UL (ref 1.5–7.7)
NEUTROPHILS NFR BLD AUTO: 76.8 %
PLATELET # BLD AUTO: 205 10(3)UL (ref 150–450)
RBC # BLD AUTO: 3.75 X10(6)UL
TOTAL IRON BINDING CAPACITY: 201 UG/DL (ref 250–425)
TRANSFERRIN SERPL-MCNC: 137 MG/DL (ref 215–365)
WBC # BLD AUTO: 12.3 X10(3) UL (ref 4–11)

## 2025-04-08 PROCEDURE — 1159F MED LIST DOCD IN RCRD: CPT | Performed by: NURSE PRACTITIONER

## 2025-04-08 PROCEDURE — 1126F AMNT PAIN NOTED NONE PRSNT: CPT | Performed by: NURSE PRACTITIONER

## 2025-04-08 PROCEDURE — 99214 OFFICE O/P EST MOD 30 MIN: CPT | Performed by: NURSE PRACTITIONER

## 2025-04-08 PROCEDURE — 3008F BODY MASS INDEX DOCD: CPT | Performed by: NURSE PRACTITIONER

## 2025-04-08 PROCEDURE — 3078F DIAST BP <80 MM HG: CPT | Performed by: NURSE PRACTITIONER

## 2025-04-08 PROCEDURE — 1160F RVW MEDS BY RX/DR IN RCRD: CPT | Performed by: NURSE PRACTITIONER

## 2025-04-08 PROCEDURE — 3074F SYST BP LT 130 MM HG: CPT | Performed by: NURSE PRACTITIONER

## 2025-04-08 RX ORDER — METFORMIN HYDROCHLORIDE 750 MG/1
750 TABLET, EXTENDED RELEASE ORAL DAILY
Qty: 30 TABLET | Refills: 0 | Status: SHIPPED | OUTPATIENT
Start: 2025-04-08

## 2025-04-08 NOTE — PATIENT INSTRUCTIONS
Increase Metformin dose to 750mg in the morning and continue to monitor your blood sugars the same way you have been    Try to eat several small meals throughout the day with a focus on protein to keep up your weight and strength    Contact the office if your morning blood sugars are too low (around 80) and I will switch you back to 500mg daily.

## 2025-04-08 NOTE — PROGRESS NOTES
Subjective:   Mohamud Fitzpatrick is a 89 year old male with PMH T2DM, CAD, PAD, CKD S 3a, CHF who presents for ER F/U     Here with his daughter    ED visit 4/1 for hyperglycemia  BS was 502. Had just finished a course of prednisone and abx the day before.    BS at home have been gradually decreasing since that visit but still elevated - fasting glucose always above 150, and evening glucose readings are everywhere from 250-380. On metformin 500mg ER daily. This was started on 3/5 after his primary stopped his combination medication due to consistently low AM fasting glucose readings.    His blood sugar log demonstrates that his fasting and dinner glucose levels had already started to climb to the readings I mentioned above prior to taking the course of prednisone.    He doesn't have much of an appetite and eats 2 meals daily. His daughter is very involved in his medication care.    Last A1c value was 6.2% done 1/12/2025.     History/Other:    Chief Complaint Reviewed and Verified  No Further Nursing Notes to   Review  Tobacco Reviewed  Allergies Reviewed  Medications Reviewed    Problem List Reviewed  Medical History Reviewed  Surgical History   Reviewed  Family History Reviewed  Social History Reviewed         Tobacco:  He smoked tobacco in the past but quit greater than 12 months ago.  Social History     Tobacco Use   Smoking Status Former    Passive exposure: Past   Smokeless Tobacco Never        Current Outpatient Medications   Medication Sig Dispense Refill    metFORMIN  MG Oral Tablet 24 Hr Take 1 tablet (750 mg total) by mouth daily. 30 tablet 0    TERAZOSIN 2 MG Oral Cap TAKE 1 CAPSULE BY MOUTH EVERY DAY AT NIGHT 90 capsule 3    losartan 100 MG Oral Tab Take 1 tablet (100 mg total) by mouth daily. 90 tablet 1    amLODIPine 5 MG Oral Tab Take 1 tablet (5 mg total) by mouth daily.      metoprolol succinate  MG Oral Tablet 24 Hr Take 1 tablet (100 mg total) by mouth daily.       furosemide 40 MG Oral Tab Take 1 tablet (40 mg total) by mouth BID (Diuretic). 60 tablet 1    allopurinol 300 MG Oral Tab Take 1 tablet (300 mg total) by mouth daily. 90 tablet 1    clopidogrel 75 MG Oral Tab Take 1 tablet (75 mg total) by mouth daily. 90 tablet 1    Glucose Blood (ACCU-CHEK SMARTVIEW) In Vitro Strip Use 1 (one) new strip twice daily for blood glucose monitoring 200 strip 3    TRUEplus Lancets 33G Does not apply Misc 1 Lancet by Finger stick route 2 (two) times daily. 200 each 3    Neomycin-Polymyxin-Dexameth 3.5-94935-3.1 Ophthalmic Suspension 2 (two) times daily.      cyanocobalamine 1000 MCG Oral Tab Take 1 tablet (1,000 mcg total) by mouth daily. 30 tablet 0    atorvastatin 40 MG Oral Tab Take 1 tablet (40 mg total) by mouth nightly.      Glucose Blood In Vitro Strip Check glucose once daily      Dorzolamide HCl-Timolol Mal 22.3-6.8 MG/ML Ophthalmic Solution Place 1 drop into both eyes 2 (two) times daily.      LUMIGAN 0.01 % Ophthalmic Solution Place 1 drop into both eyes every evening.      levoFLOXacin 500 MG Oral Tab Take 1 tablet (500 mg total) by mouth daily. (Patient not taking: Reported on 4/8/2025) 7 tablet 0    SENEXON-S 8.6-50 MG Oral Tab Take 1 tablet by mouth daily. (Patient not taking: Reported on 4/8/2025) 90 tablet 3    triamcinolone 0.1 % External Cream Apply topically 2 (two) times daily as needed. (Patient not taking: Reported on 4/8/2025) 45 g 3         Review of Systems:  Review of Systems  10 point review of systems otherwise negative with the exception of HPI and assessment and plan.    Objective:   /70   Pulse (!) 45   Temp 97.8 °F (36.6 °C) (Temporal)   Ht 4' 10\" (1.473 m)   SpO2 100%   BMI 19.86 kg/m²  Estimated body mass index is 19.86 kg/m² as calculated from the following:    Height as of this encounter: 4' 10\" (1.473 m).    Weight as of an earlier encounter on 4/8/25: 95 lb (43.1 kg).      Physical Exam  Vitals reviewed.   Constitutional:       General:  He is not in acute distress.     Appearance: He is underweight. He is not ill-appearing.   Cardiovascular:      Rate and Rhythm: Normal rate and regular rhythm.      Heart sounds: Murmur heard.      Comments: Chronic murmur - most recent TTE Jan 2025  Pulmonary:      Effort: Pulmonary effort is normal. No respiratory distress.   Skin:     General: Skin is warm and dry.         Assessment & Plan:   1. Type 2 diabetes mellitus without complication, without long-term current use of insulin (HCC) (Primary)  -     metFORMIN HCl ER; Take 1 tablet (750 mg total) by mouth daily.  Dispense: 30 tablet; Refill: 0  - He looks well after ER visit and is back to baseline per him and his daughter.  - Given his elevated fasting and dinner glucose levels after medication change, will increase his metformin dose to 750mg ER once daily - advised to continue checking his blood sugar as he has been and report back to me if AM fasting dips back to 80s in the next couple of weeks after dosing change, and I will decrease the dose back to 500 daily. Otherwise follow up as scheduled with Dr. Rodriguez in June for A1C recheck and physical. He and his daughter verbalized understanding.        Return in about 2 months (around 6/11/2025) for scheduled visit with Dr. Rodriguez.    TEE Byrne, 4/8/2025, 5:38 PM     This note was prepared using Dragon Medical voice recognition dictation software. As a result errors may occur. When identified, these errors have been corrected. While every attempt is made to correct errors during dictation discrepancies may still exist.

## 2025-04-08 NOTE — PROGRESS NOTES
[facility] Hematology/Oncology Progress Note     Patient Name: Mohamud Fitzpatrick   YOB: 1935   Medical Record Number: [id number]   [facility]: [id number]     Reason for Consultation:    1. Other iron deficiency anemia    2. Stage 3a chronic kidney disease (HCC)      Current Therapy  S/p Feraheme 2/24    INTERVAL HISTORY  Patient returns for follow-up. Since the last visit he was treated with Feraheme and has tolerated this well.  He did have some improvement of his energy levels but developed an infection and was on antibiotics and has become more weak.  No significant bleeding issues.  Weight is stable     Past Hematologic History  Mohamud Fitzpatrick is a 89 year old male seen today in the Hematology Clinic  for normocytic normochromic anemia.. CBC done on 10/8/2024 showed WBC 10.9 K, platelet count of 277 K and Hemoglobin and Hematocrit of 9.3/28.4.      Iron studies showed a decreased iron saturation of 11%.  Serum iron is 27.  Transferrin was low at 171.  He has been taking oral ferrous sulfate twice a day.  I reviewed his labs and he has had mild normocytic normochromic anemia dating back to 2020.  No prior history of peptic ulcer disease. He has a remote history of colorectal cancer status post partial colectomy.  Last colonoscopy was several years ago.    He had to have his teeth removed and has ill-fitting dentures that has limited his ability to eat much solid food.  He likes vegetables but tries to eat red meat occasionally.  Has lost some weight as a result.    2/2025 received INFeD initially and then subsequently Feraheme    Past Medical History:  Past Medical History:    Acute congestive heart failure, unspecified heart failure type (HCC)    Atherosclerosis of coronary artery    Congestive heart disease (HCC)    Coronary atherosclerosis    Diabetes (HCC)    Essential hypertension    Former smoker    High blood pressure    History of quadruple bypass    Pressure ulcer       Past  Surgical History:  Past Surgical History:   Procedure Laterality Date    Cabg  05/2009    Carotid endarterectomy      Colonoscopy      Other surgical history         Family Medical History:  Family History   Problem Relation Age of Onset    No Known Problems Father     Other (Other) Mother     Heart Disorder Brother        Gyne History:      Social History:  Social History     Socioeconomic History    Marital status:      Spouse name: Not on file    Number of children: Not on file    Years of education: Not on file    Highest education level: Not on file   Occupational History    Not on file   Tobacco Use    Smoking status: Former     Passive exposure: Past    Smokeless tobacco: Never   Vaping Use    Vaping status: Never Used   Substance and Sexual Activity    Alcohol use: Never    Drug use: Never    Sexual activity: Not Currently     Partners: Female   Other Topics Concern    Grew up on a farm Not Asked    History of tanning Not Asked    Outdoor occupation Not Asked    Reaction to local anesthetic No    Pt has a pacemaker No    Pt has a defibrillator No   Social History Narrative    Not on file     Social Drivers of Health     Food Insecurity: No Food Insecurity (3/5/2025)    NCSS - Food Insecurity     Worried About Running Out of Food in the Last Year: No     Ran Out of Food in the Last Year: No   Transportation Needs: No Transportation Needs (3/5/2025)    NCSS - Transportation     Lack of Transportation: No   Housing Stability: Not At Risk (3/5/2025)    NCSS - Housing/Utilities     Has Housing: Yes     Worried About Losing Housing: No     Unable to Get Utilities: No       Allergies:   Allergies[1]    Current Medications:   levoFLOXacin 500 MG Oral Tab Take 1 tablet (500 mg total) by mouth daily. 7 tablet 0    predniSONE 20 MG Oral Tab Take 2 tablets per oral every day for 3 days, then take 1 tablet per oral every day for 4 days 10 tablet 0    SENEXON-S 8.6-50 MG Oral Tab Take 1 tablet by mouth daily. 90  tablet 3    TERAZOSIN 2 MG Oral Cap TAKE 1 CAPSULE BY MOUTH EVERY DAY AT NIGHT 90 capsule 3    ferrous sulfate 325 (65 FE) MG Oral Tab EC Take 1 tablet (325 mg total) by mouth 3 (three) times a week. 39 tablet 3    losartan 100 MG Oral Tab Take 1 tablet (100 mg total) by mouth daily. 90 tablet 1    metFORMIN  MG Oral Tablet 24 Hr Take 1 tablet (500 mg total) by mouth daily. 90 tablet 1    amLODIPine 5 MG Oral Tab Take 1 tablet (5 mg total) by mouth daily.      metoprolol succinate  MG Oral Tablet 24 Hr Take 1 tablet (100 mg total) by mouth daily.      furosemide 40 MG Oral Tab Take 1 tablet (40 mg total) by mouth BID (Diuretic). 60 tablet 1    allopurinol 300 MG Oral Tab Take 1 tablet (300 mg total) by mouth daily. 90 tablet 1    clopidogrel 75 MG Oral Tab Take 1 tablet (75 mg total) by mouth daily. 90 tablet 1    Glucose Blood (ACCU-CHEK SMARTVIEW) In Vitro Strip Use 1 (one) new strip twice daily for blood glucose monitoring 200 strip 3    TRUEplus Lancets 33G Does not apply Misc 1 Lancet by Finger stick route 2 (two) times daily. 200 each 3    Neomycin-Polymyxin-Dexameth 3.5-57520-5.1 Ophthalmic Suspension 2 (two) times daily.      triamcinolone 0.1 % External Cream Apply topically 2 (two) times daily as needed. 45 g 3    cyanocobalamine 1000 MCG Oral Tab Take 1 tablet (1,000 mcg total) by mouth daily. 30 tablet 0    atorvastatin 40 MG Oral Tab Take 1 tablet (40 mg total) by mouth nightly.      Glucose Blood In Vitro Strip Check glucose once daily      Dorzolamide HCl-Timolol Mal 22.3-6.8 MG/ML Ophthalmic Solution Place 1 drop into both eyes 2 (two) times daily.      LUMIGAN 0.01 % Ophthalmic Solution Place 1 drop into both eyes every evening.         Review of Systems:  Pertinent positives and negatives noted in the the HPI.     Physical Examination:  /70 (BP Location: Left arm, Patient Position: Sitting, Cuff Size: large)   Pulse 58   Temp (!) 96.1 °F (35.6 °C) (Tympanic)   Resp 16   Ht  1.473 m (4' 10\")   Wt 43.1 kg (95 lb)   SpO2 98%   BMI 19.86 kg/m²    General: Patient is alert and oriented x 3, not in acute distress.  Milldy cachectic  HEENT: EOMs intact. PERRL. Oropharynx is clear.   Neck: No JVD. No palpable lymphadenopathy. Neck is supple.  Lymphatics: There is no palpable lymphadenopathy throughout in the cervical, supraclavicular, axillary, or inguinal regions.  Chest: Clear to auscultation. No wheezes or rales.  Heart: Regular rate and rhythm. S1S2 normal.  Extremities: No edema or calf tenderness.   Neurological: Grossly intact. Uses a walker to ambulate    Labs:    Lab Results   Component Value Date/Time    WBC 12.3 (H) 04/08/2025 02:02 PM    RBC 3.75 (L) 04/08/2025 02:02 PM    HGB 9.9 (L) 04/08/2025 02:02 PM    HCT 30.4 (L) 04/08/2025 02:02 PM    MCV 81.1 04/08/2025 02:02 PM    MCH 26.4 04/08/2025 02:02 PM    MCHC 32.6 04/08/2025 02:02 PM    RDW 14.3 04/08/2025 02:02 PM    NEPRELIM 9.44 (H) 04/08/2025 02:02 PM    .0 04/08/2025 02:02 PM       Impression:  Diagnosis  1. Other iron deficiency anemia    2. Stage 3a chronic kidney disease (HCC)      Plan:  I suspect his anemia is likely secondary to mild iron deficiency, chronic kidney disease as well as anemia of chronic inflammation due to underlying diabetes and other comorbidities.  He has had some response to Feraheme recently    Sp Feraheme, Ferritin elevated but iron low, likely secondary to recent infection. Repeat CBC/Fe studies.   FOBT if positive, rec EGD/C-scope  If anemia persistent with hemoglobin consistently less than 10 then can consider erythropoietin therapy given his stage IIIb CKD  RTC 3-4 mos    Thank you Dr Priyank Rodriguez MD for the opportunity to participate in the care of this interesting patient. Please do contact me if I may be of any further assistance    Juanito Caruso MD  [facility] [place] Hematology/Oncology           [1] No Known Allergies

## 2025-04-09 ENCOUNTER — TELEPHONE (OUTPATIENT)
Dept: INTERNAL MEDICINE CLINIC | Facility: CLINIC | Age: 89
End: 2025-04-09

## 2025-04-09 NOTE — TELEPHONE ENCOUNTER
Olimpia from Dr. Jordan's office (ophthalmologist) states patient arrived from his appointment, but the office notes need to be faxed.  Office notes faxed to [phone]   No

## 2025-04-11 ENCOUNTER — TELEPHONE (OUTPATIENT)
Dept: INTERNAL MEDICINE CLINIC | Facility: CLINIC | Age: 89
End: 2025-04-11

## 2025-04-14 NOTE — TELEPHONE ENCOUNTER
Advised to take metformin 500 mg po bid  and put 750 mg on the side    Follow up with pcp  Patient voiced understanding  and agrees with plan    You are receiving this message because you were on call 25, please advise what advice was given to the patient. Please sign the encounter after you document if there is no further action needed.     Message # [phone]         2025 04:40p   [KOREY]  To:  From:  SMILEY Wellington MD:  Phone#:  ----------------------------------------------------------------------  ALMA STACY 1935  RE; BLOOD SUGAR HIS HIGH [address] PCP  DR. DINH   [phone]  Paged at number :  PAGE: [phone] at :   16:40          (Message Delivered)   D E L I V E R I E S :  2025 04:40p           KOREY Guo

## 2025-04-15 ENCOUNTER — TELEPHONE (OUTPATIENT)
Dept: INTERNAL MEDICINE CLINIC | Facility: CLINIC | Age: 89
End: 2025-04-15

## 2025-04-15 ENCOUNTER — PATIENT OUTREACH (OUTPATIENT)
Dept: CASE MANAGEMENT | Age: 89
End: 2025-04-15

## 2025-04-15 NOTE — PROGRESS NOTES
MUNIRA Follow-up Assessment    General:  Assessment completed with: Patient  Patient Subjective: When the sugar is up I feel not good but now that it's going down I feel better.  Chief Complaint: Hyperglycemia    Progress/Care Plan:  Is the patient progressing as planned?: Yes  Care Plan Update: Pt completed appts with cardiology, hematology and PCP appts. His metformin was increased to 750mg a day. His blood sugar continued to be high and therefor Metformin was increased to 500mg twice a day. He states that now since his blood sugars have been coming down he has been starting to feel better. He states that his blood pressures have been fine.  New Care Plan: Pt will continue to monitor blood sugar and report any abnormal readings. NCM sent blood sugar readings to PCP office today. Pt will continue to monitor for s/s of hyperglycemia/hypoglycemia. Pt understands when to return to the ED. He will call NCM should any non emergent questions or concerns arise.  Frequency/Follow Up Plan: Follow up 1 week ( bs log, weakness)     Notes:  Navigator Notes: NCM s/w patient for MUNIRA follow up. Pt states now that his blood sugars have been going down he is starting to feel better. NCM reviewed when to seek medical attention. He denied having any fever, n/v/c/d, sob or any new or worsening symptoms. He denied having any questions or concerns at this time.                Fasting    Afternoon  4/12: 157        326  4/13-:146        256  4/14-: 95         204  4/15-:104     VIJAY sent log to PCP office.

## 2025-04-15 NOTE — TELEPHONE ENCOUNTER
Please call the patient.  Continue same dose of metformin for now.  I am glad he is feeling better.  Keep his June appointment to see me

## 2025-04-15 NOTE — TELEPHONE ENCOUNTER
Advised patient of Dr. Rodriguez's note. Patient verbalized understanding and had no further questions.

## 2025-04-15 NOTE — TELEPHONE ENCOUNTER
S/w patient for MUNIRA. He states that he has been feeling better since his blood sugars have now started to come down since changing Metformin dose to 500mg twice a day. Readings are below.           Fasting    Afternoon  4/12: 157        326  4/13-:146        256  4/14-: 95         204  4/15-:104

## 2025-04-23 ENCOUNTER — PATIENT OUTREACH (OUTPATIENT)
Dept: CASE MANAGEMENT | Age: 89
End: 2025-04-23

## 2025-04-23 NOTE — PROGRESS NOTES
MUNIRA Follow-up Assessment    General:  Assessment completed with: Patient  Patient Subjective: I feel better. I ate a lot of stuff  and sugar because of the holiday.  Chief Complaint: Hyperglycemia    Progress/Care Plan:  Is the patient progressing as planned?: Yes  Care Plan Update: Pt states that he ate a lot of sugar for the Easter holiday but blood sugar only got a little higher. Last night it was 261 and this morning it was 145. He states that he has diarrhea but that is not new and has been ongoing for a long time. He denies that it is any worse. He has itching over his body but denies that it is new and has been going on for 3-4 months now. He has seen a dermatologist.  New Care Plan: Pt will monitor his sugar intake and will continue to monitor his blood sugars. He will report any abnormal findings. He will monitor for any worsening of diarrhea or any new or worsening symptoms. He will schedule an appt with dermatology. He will contact [facility] with any non emergent questions or concerns.  Frequency/Follow Up Plan: Follow up 1 week (derm appt, itching, diarrhea, bs)     Notes:  Navigator Notes: [facility] s/w patient for MUNIRA follow up. He states that he is feeling better. He did admit to indulging in a good amount of sweets over the holiday but his blood sugar is getting better again. He denies having any dizziness. He states that his blood pressures have been good. He does have itching over his whole body, no rash, but states that this has been going on for 3-4 months. He saw a dermatologist who instructed him to go tanning at which point it did stop but then came back after he stopped going tanning. He states that he will schedule another appt with dermatology. He denied having any fever, n/v, sob, pain or any new or worsening symptoms.

## 2025-04-25 ENCOUNTER — TELEPHONE (OUTPATIENT)
Dept: INTERNAL MEDICINE CLINIC | Facility: CLINIC | Age: 89
End: 2025-04-25

## 2025-04-25 ENCOUNTER — NURSE ONLY (OUTPATIENT)
Dept: DERMATOLOGY CLINIC | Facility: CLINIC | Age: 89
End: 2025-04-25

## 2025-04-25 DIAGNOSIS — L29.89 UREMIC PRURITUS: ICD-10-CM

## 2025-04-25 PROCEDURE — 96900 ACTINOTHERAPY UV LIGHT: CPT | Performed by: STUDENT IN AN ORGANIZED HEALTH CARE EDUCATION/TRAINING PROGRAM

## 2025-04-25 NOTE — TELEPHONE ENCOUNTER
Patient requesting refill patient completely out       [id number] - [place], [place] - [address] AT [facility]       Medication Detail    Medication Quantity Refills Start End   metFORMIN  MG Oral Tablet 24 Hr (Discontinued) 90 tablet 1 3/5/2025 4/8/2025   Sig:   Take 1 tablet (500 mg total) by mouth daily.     Route:   Oral     Reason for Discontinue:   Dose adjustment     Order #:   [id number]

## 2025-04-25 NOTE — TELEPHONE ENCOUNTER
Outpatient Medication Detail     Disp Refills Start End    metFORMIN  MG Oral Tablet 24 Hr 30 tablet 0 4/8/2025 --    Sig - Route: Take 1 tablet (750 mg total) by mouth daily. - Oral    Sent to pharmacy as: metFORMIN HCl  MG Oral Tablet Extended Release 24 Hour (Glucophage XR)    E-Prescribing Status: Receipt confirmed by pharmacy (4/8/2025  5:52 PM CDT)      Associated Diagnoses    Type 2 diabetes mellitus without complication, without long-term current use of insulin ([facility])  - Primary        Pharmacy    [id number] - [place], [place] - [address] AT [facility], [phone], [phone]       Dose changed to Metformin  mg on 4/8/2025

## 2025-04-28 RX ORDER — METFORMIN HYDROCHLORIDE 500 MG/1
500 TABLET, EXTENDED RELEASE ORAL 2 TIMES DAILY WITH MEALS
Qty: 180 TABLET | Refills: 1 | Status: SHIPPED | OUTPATIENT
Start: 2025-04-28

## 2025-04-28 NOTE — TELEPHONE ENCOUNTER
Patient called (identified name and ),   Does not want to take metformin 750 mg once daily.  States it is \"too strong.\"  States glucose this morning 155, last evening 211.  He requests new script for metformin 500 mg twice daily.  Dr Rodriguez, please advise, he states he is out of medication.  [facility] [address].    Office visit 3/05/2025  ASSESSMENT/PLAN:   1. Type 2 diabetes mellitus without complication, without long-term current use of insulin (HCC)  Diabetes is controlled but he may be overmedicated.  We will stop the glipizide metformin combination instead replace with metformin  mg once a day.  Keep checking sugars.  Follow-up in 1 to 3 months for recheck.  Call if sugars are too high.

## 2025-04-28 NOTE — TELEPHONE ENCOUNTER
Patient calling back, see message below.  He states the doctor told him to take metformin twice a day and now he has run out.  Seeking refill.    Dr Rodriguez, please advise?

## 2025-05-01 ENCOUNTER — PATIENT OUTREACH (OUTPATIENT)
Dept: CASE MANAGEMENT | Age: 89
End: 2025-05-01

## 2025-05-01 NOTE — PROGRESS NOTES
MUNIRA Graduation Assessment    General:  Assessment completed with: Patient  Patient Subjective: I'm doing better.    Care Plan/Instructions:   Care Plan Summary (Recap of navigation period including # of ED & Hospital Admission, and if goals met or unmet): Pt has not had any ED visits during MUNIRA navigation. His blood sugars have improved and it was 164 this morning. He has decreased his sugar intake. He did have some dizziness this morning stating that will happen if he hasn't been hydrating himslelf, which he admits he did not drink enough fluids yesterday. He states that has since resolved.  Patient Graduation Instructions (Ongoing barriers to care identified, Areas of Need, Areas of Progress): Pt states that he will make sure to hydrate more and will continue to monitor symptoms. He will continue to monitor blood sugars and understands when to seek medical attention. He agrees to contact NCM should he have any non emergent questions or concerns.     Care Management/Programs:  Does the patient require further care management?: No

## 2025-05-01 NOTE — PROGRESS NOTES
MUNIRA Graduation Assessment    General:  Assessment completed with: Patient  Patient Subjective: I'm doing better.    Care Plan/Instructions:   Care Plan Summary (Recap of navigation period including # of ED & Hospital Admission, and if goals met or unmet): Pt has not had any ED visits during MUNIRA navigation. His blood sugars have improved and it was 164 this morning. He has decreased his sugar intake. He did have some dizziness this morning stating that will happen if he hasn't been hydrating himslelf, which he admits he did not drink enough fluids yesterday. He states that has since resolved.  Patient Graduation Instructions (Ongoing barriers to care identified, Areas of Need, Areas of Progress): Pt states that he will make sure to hydrate more and will continue to monitor symptoms. He will continue to monitor blood sugars and understands when to seek medical attention. He agrees to contact NCM should he have any non emergent questions or concerns.   Pt also states that he has an appt with dermatology tomorrow, 5/2/2025.     Care Management/Programs:  Does the patient require further care management?: No

## 2025-05-02 ENCOUNTER — NURSE ONLY (OUTPATIENT)
Dept: DERMATOLOGY CLINIC | Facility: CLINIC | Age: 89
End: 2025-05-02

## 2025-05-02 DIAGNOSIS — L29.89 UREMIC PRURITUS: ICD-10-CM

## 2025-05-02 PROCEDURE — 96900 ACTINOTHERAPY UV LIGHT: CPT | Performed by: STUDENT IN AN ORGANIZED HEALTH CARE EDUCATION/TRAINING PROGRAM

## 2025-05-19 ENCOUNTER — TELEPHONE (OUTPATIENT)
Dept: DERMATOLOGY CLINIC | Facility: CLINIC | Age: 89
End: 2025-05-19

## 2025-05-19 RX ORDER — GLUCOSAM/CHON-MSM1/C/MANG/BOSW 500-416.6
1 TABLET ORAL 2 TIMES DAILY
Qty: 200 EACH | Refills: 3 | Status: SHIPPED | OUTPATIENT
Start: 2025-05-19

## 2025-05-19 NOTE — TELEPHONE ENCOUNTER
S/w pt - LOV March 2025 for pruritus, pt is still experiencing itching - appt made for F/U with DM.

## 2025-06-03 ENCOUNTER — OFFICE VISIT (OUTPATIENT)
Dept: DERMATOLOGY CLINIC | Facility: CLINIC | Age: 89
End: 2025-06-03

## 2025-06-03 DIAGNOSIS — L29.89 UREMIC PRURITUS: Primary | ICD-10-CM

## 2025-06-03 PROCEDURE — 1160F RVW MEDS BY RX/DR IN RCRD: CPT | Performed by: STUDENT IN AN ORGANIZED HEALTH CARE EDUCATION/TRAINING PROGRAM

## 2025-06-03 PROCEDURE — 1159F MED LIST DOCD IN RCRD: CPT | Performed by: STUDENT IN AN ORGANIZED HEALTH CARE EDUCATION/TRAINING PROGRAM

## 2025-06-03 PROCEDURE — 99214 OFFICE O/P EST MOD 30 MIN: CPT | Performed by: STUDENT IN AN ORGANIZED HEALTH CARE EDUCATION/TRAINING PROGRAM

## 2025-06-03 RX ORDER — CLOPIDOGREL BISULFATE 75 MG/1
75 TABLET ORAL DAILY
Qty: 90 TABLET | Refills: 3 | Status: SHIPPED | OUTPATIENT
Start: 2025-06-03

## 2025-06-03 RX ORDER — NALTREXONE HYDROCHLORIDE 50 MG/1
50 TABLET, FILM COATED ORAL DAILY
Qty: 90 TABLET | Refills: 0 | Status: SHIPPED | OUTPATIENT
Start: 2025-06-03

## 2025-06-03 RX ORDER — FERROUS SULFATE 325(65) MG
TABLET, DELAYED RELEASE (ENTERIC COATED) ORAL
COMMUNITY
Start: 2025-05-23

## 2025-06-03 NOTE — TELEPHONE ENCOUNTER
Please Review. Protocol Failed; No Protocol     Requested Prescriptions   Pending Prescriptions Disp Refills    CLOPIDOGREL 75 MG Oral Tab [Pharmacy Med Name: CLOPIDOGREL 75 MG TABLET] 90 tablet 1     Sig: TAKE 1 TABLET BY MOUTH EVERY DAY       There is no refill protocol information for this order

## 2025-06-03 NOTE — PROGRESS NOTES
Established Patient     Referred by:   Priyank Rodriguez MD     CHIEF COMPLAINT: Rash     HISTORY OF PRESENT ILLNESS: Mohamud Fitzpatrick is a 89 year old male here for evaluation of rash.    Location: All over body  Duration: Years   Improving, worsening, or stable?: Worsening  Scaly?:No    Itchy?:Yes  Current treatment: CeraVe Itch, UVB  Past treatments: CereVe    Personal Dermatologic History  History of chronic skin disease:No    Family History  History autoimmune diseases:  No    Past Medical History  Past Medical History:    Acute congestive heart failure, unspecified heart failure type (HCC)    Atherosclerosis of coronary artery    Congestive heart disease (HCC)    Coronary atherosclerosis    Diabetes (HCC)    Essential hypertension    Former smoker    High blood pressure    History of quadruple bypass    Pressure ulcer       REVIEW OF SYSTEMS:  Constitutional: Denies fever, chills, unintentional weight loss.   Skin as per HPI    Medications  Current Outpatient Medications   Medication Sig Dispense Refill    clopidogrel 75 MG Oral Tab Take 1 tablet (75 mg total) by mouth daily. 90 tablet 3    TRUEplus Lancets 33G Does not apply Misc 1 each by In Vitro route 2 (two) times daily. 200 each 3    metFORMIN  MG Oral Tablet 24 Hr Take 1 tablet (500 mg total) by mouth 2 (two) times daily with meals. 180 tablet 1    metFORMIN  MG Oral Tablet 24 Hr Take 1 tablet (750 mg total) by mouth daily. 30 tablet 0    levoFLOXacin 500 MG Oral Tab Take 1 tablet (500 mg total) by mouth daily. (Patient not taking: Reported on 4/8/2025) 7 tablet 0    SENEXON-S 8.6-50 MG Oral Tab Take 1 tablet by mouth daily. (Patient not taking: Reported on 4/8/2025) 90 tablet 3    TERAZOSIN 2 MG Oral Cap TAKE 1 CAPSULE BY MOUTH EVERY DAY AT NIGHT 90 capsule 3    losartan 100 MG Oral Tab Take 1 tablet (100 mg total) by mouth daily. 90 tablet 1    amLODIPine 5 MG Oral Tab Take 1 tablet (5 mg total) by mouth daily.      metoprolol  succinate  MG Oral Tablet 24 Hr Take 1 tablet (100 mg total) by mouth daily.      furosemide 40 MG Oral Tab Take 1 tablet (40 mg total) by mouth BID (Diuretic). 60 tablet 1    allopurinol 300 MG Oral Tab Take 1 tablet (300 mg total) by mouth daily. 90 tablet 1    Glucose Blood (ACCU-CHEK SMARTVIEW) In Vitro Strip Use 1 (one) new strip twice daily for blood glucose monitoring 200 strip 3    Neomycin-Polymyxin-Dexameth 3.5-18005-2.1 Ophthalmic Suspension 2 (two) times daily.      triamcinolone 0.1 % External Cream Apply topically 2 (two) times daily as needed. (Patient not taking: Reported on 4/8/2025) 45 g 3    cyanocobalamine 1000 MCG Oral Tab Take 1 tablet (1,000 mcg total) by mouth daily. 30 tablet 0    atorvastatin 40 MG Oral Tab Take 1 tablet (40 mg total) by mouth nightly.      Glucose Blood In Vitro Strip Check glucose once daily      Dorzolamide HCl-Timolol Mal 22.3-6.8 MG/ML Ophthalmic Solution Place 1 drop into both eyes 2 (two) times daily.      LUMIGAN 0.01 % Ophthalmic Solution Place 1 drop into both eyes every evening.         PHYSICAL EXAM:  General: awake, alert, no acute distress  Skin: Skin exam was performed today including the following: extremities. Pertinent findings include:   - trunk and extremities clear     ASSESSMENT & PLAN:  Pathophysiology of diagnoses discussed with patient.  Therapeutic options reviewed. Risks, benefits, and alternatives discussed with patient. Instructions reviewed at length.     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     Return to clinic: 2 months or sooner if something concerning arises    Dae Solano MD    By signing my name below, I, Syl GARCIA MA,  attest that this documentation has been prepared under the direction and in the presence of Dae  MD Xiomara.   Electronically Signed: Syl GARCIA MA, 6/3/2025, 3:29 PM.      I, Dae Solano MD,  personally performed the services described in this documentation. All medical record entries made by the scribe were at my direction and in my presence.  I have reviewed the chart and agree that the record reflects my personal performance and is accurate and complete.  Dae Solano MD, 6/3/2025, 3:49 PM

## 2025-06-09 RX ORDER — ALLOPURINOL 300 MG/1
300 TABLET ORAL DAILY
Qty: 90 TABLET | Refills: 1 | Status: SHIPPED | OUTPATIENT
Start: 2025-06-09

## 2025-06-09 NOTE — TELEPHONE ENCOUNTER
Please review: medication fails/has no protocol attached.    Future Appointments   Date Time Provider Department Center   6/11/2025  4:20 PM Priyank Rodriguez MD [facility]     Last office visit: 3/5/2025

## 2025-06-11 ENCOUNTER — TELEPHONE (OUTPATIENT)
Dept: INTERNAL MEDICINE CLINIC | Facility: CLINIC | Age: 89
End: 2025-06-11

## 2025-06-11 ENCOUNTER — OFFICE VISIT (OUTPATIENT)
Dept: INTERNAL MEDICINE CLINIC | Facility: CLINIC | Age: 89
End: 2025-06-11

## 2025-06-11 VITALS
HEART RATE: 66 BPM | SYSTOLIC BLOOD PRESSURE: 122 MMHG | RESPIRATION RATE: 18 BRPM | DIASTOLIC BLOOD PRESSURE: 52 MMHG | WEIGHT: 99 LBS | OXYGEN SATURATION: 97 % | TEMPERATURE: 98 F | BODY MASS INDEX: 19.44 KG/M2 | HEIGHT: 60 IN

## 2025-06-11 DIAGNOSIS — D50.9 IRON DEFICIENCY ANEMIA, UNSPECIFIED IRON DEFICIENCY ANEMIA TYPE: ICD-10-CM

## 2025-06-11 DIAGNOSIS — L29.9 PRURITUS: ICD-10-CM

## 2025-06-11 DIAGNOSIS — I10 ESSENTIAL HYPERTENSION: ICD-10-CM

## 2025-06-11 DIAGNOSIS — I25.119 ATHEROSCLEROSIS OF NATIVE CORONARY ARTERY OF NATIVE HEART WITH ANGINA PECTORIS: ICD-10-CM

## 2025-06-11 DIAGNOSIS — E11.69 TYPE 2 DIABETES MELLITUS WITH OTHER SPECIFIED COMPLICATION, WITHOUT LONG-TERM CURRENT USE OF INSULIN (HCC): Primary | ICD-10-CM

## 2025-06-11 LAB — HEMOGLOBIN A1C: 8 % (ref 4.3–5.6)

## 2025-06-11 PROCEDURE — 99214 OFFICE O/P EST MOD 30 MIN: CPT | Performed by: INTERNAL MEDICINE

## 2025-06-11 PROCEDURE — 1126F AMNT PAIN NOTED NONE PRSNT: CPT | Performed by: INTERNAL MEDICINE

## 2025-06-11 PROCEDURE — 3074F SYST BP LT 130 MM HG: CPT | Performed by: INTERNAL MEDICINE

## 2025-06-11 PROCEDURE — 3078F DIAST BP <80 MM HG: CPT | Performed by: INTERNAL MEDICINE

## 2025-06-11 PROCEDURE — 3008F BODY MASS INDEX DOCD: CPT | Performed by: INTERNAL MEDICINE

## 2025-06-11 PROCEDURE — 83036 HEMOGLOBIN GLYCOSYLATED A1C: CPT | Performed by: INTERNAL MEDICINE

## 2025-06-11 PROCEDURE — G2211 COMPLEX E/M VISIT ADD ON: HCPCS | Performed by: INTERNAL MEDICINE

## 2025-06-11 PROCEDURE — 1159F MED LIST DOCD IN RCRD: CPT | Performed by: INTERNAL MEDICINE

## 2025-06-11 PROCEDURE — 1160F RVW MEDS BY RX/DR IN RCRD: CPT | Performed by: INTERNAL MEDICINE

## 2025-06-11 RX ORDER — METFORMIN HYDROCHLORIDE 500 MG/1
TABLET, EXTENDED RELEASE ORAL
Qty: 270 TABLET | Refills: 1 | Status: SHIPPED | OUTPATIENT
Start: 2025-06-11

## 2025-06-12 NOTE — TELEPHONE ENCOUNTER
Prior authorization for metformin ER was done through [facility]. It can take up to 14 business days for a decision to come back

## 2025-06-19 ENCOUNTER — TELEPHONE (OUTPATIENT)
Dept: INTERNAL MEDICINE CLINIC | Facility: CLINIC | Age: 89
End: 2025-06-19

## 2025-06-19 ENCOUNTER — TELEPHONE (OUTPATIENT)
Dept: CASE MANAGEMENT | Age: 89
End: 2025-06-19

## 2025-06-19 ENCOUNTER — TELEPHONE (OUTPATIENT)
Dept: DERMATOLOGY CLINIC | Facility: CLINIC | Age: 89
End: 2025-06-19

## 2025-06-19 DIAGNOSIS — L29.9 ITCHY SKIN: Primary | ICD-10-CM

## 2025-06-19 NOTE — TELEPHONE ENCOUNTER
Dr Rodriguez,    See patient message below to dermatology. He wanted me to send this message to you to advise if his medications need to be reviewed to rule out if his medications is causing  his body itch.    Please reply to pool: EM RN TRIAGE - last office visit 6/11/25. Do you need an appointment

## 2025-06-19 NOTE — TELEPHONE ENCOUNTER
Dr Rodriguez,     Patient requesting referral to Dr. Aniceto Varela.     Pended referral please review diagnosis and sign off if you agree.    Thank you.  Shameka Haile  [facility]

## 2025-06-19 NOTE — TELEPHONE ENCOUNTER
Spoke with patient and he received a call from Dr SUZY Solano with further orders. See MD 6/19/25  notes below. Patient verbalized understanding and agreed with MD plan. Will continue on the same 3 medications per MD orders.    Please reply to pool: EM RN TRIAGE

## 2025-06-19 NOTE — TELEPHONE ENCOUNTER
Patient seen Dr SUZY Solano (Dermatology) on 6/3/25 for his generalized body itch. Currently taking naltrexone 50 mg daily and over the counter anti-itch cream which is not helping. No rash present, but itchy and  scratches frequently, which causes bleeding from frequent scratching.  Denies shortness of breath or any other symptoms. Patient inquiring what to do next.     Tasked to Dr SUZY Solano and dermatology clinical staff.    Please reply to pool: EM RN TRIAGE

## 2025-06-19 NOTE — TELEPHONE ENCOUNTER
Patient called    States pill prescribed is not helping with itching. Was told to call if no help. Please call

## 2025-06-19 NOTE — TELEPHONE ENCOUNTER
LOV 6/3/25 - Pt has been taking Naltrexone 50 mg daily and states it has not helped the itching at all.  Pt is still itching day and night.  Pt is not currently taking any other medications for itching and denies using any topicals at this time.  Please advise.  Thank you.

## 2025-06-19 NOTE — TELEPHONE ENCOUNTER
Unfortunately I do not have any other medicines that would help. Would recommend a second opinion from DR. Baig at [facility] but I would continue taking naltrexone to see if he does have any more improvement over the next month.     Dae Solano MD

## 2025-06-20 NOTE — TELEPHONE ENCOUNTER
Reji-   Is there an in network Dermatologist ([facility] or [facility]) that can take care of the patient? How about an contracted tertiary center like [facility]?

## 2025-06-20 NOTE — TELEPHONE ENCOUNTER
Pt requesting referral to [facility]. Can we redirect in network. I am including our Derm doctor that referred the patient. Is there an in network option ([facility] or [facility]? Or someone at [facility] for a tertiary referral?

## 2025-06-23 NOTE — PROGRESS NOTES
HPI:    Patient ID: Mohamud Fitzpatrick is a 89 year old male.    HPI  Patient is here for follow-up on chronic medical issues.  I last saw him about 3 months ago on March 5.  At that time we changed the glipizide metformin over to metformin extended release 500 mg a day.  This was to avoid low sugars.  A1c at that time was 6.2.    Since his last visit he has seen dermatology, cardiology, oncology.  Patient has had issues with his skin.  Chronic itching but not a lot of rash.  He went to see dermatology.  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  His last GFR was 55.  He was treated with light therapy.  He may not have been completely compliant.  And switch to naltrexone.  The itching started in his legs for a couple of years but now it is all over.  Did not improve with prednisone.  Triamcinolone cream did not help.  Symptoms persist.  He does note that when he lays down he can sweat on his back and then it itches more.  When he sweats during the day it does not cause more itching.  During the interim patient had cough with some phlegm production.  Placed on antibiotics and prednisone.  I was out of the emergency room on April 1.  Blood sugar went up a little bit during that time.    Point-of-care A1c testing done today is 8.0.    Problem List[1]       HISTORY:  Past Medical History[2]   Past Surgical History[3]   Family History[4]   Short Social Hx on File[5]       Review of Systems        Current Medications[6]  Allergies:Allergies[7]     PHYSICAL EXAM:   /52 (BP Location: Left arm, Patient Position: Sitting, Cuff Size: large)   Pulse 66   Temp 98.4 °F (36.9 °C) (Other)   Resp 18   Ht 4' 10\" (1.473 m)   Wt 99 lb (44.9 kg)   SpO2 97%   BMI 20.69 kg/m²      Physical Exam  Constitutional:       Appearance: Normal appearance. He is well-developed.   HENT:      Nose: Nose normal.      Mouth/Throat:      Pharynx: No oropharyngeal exudate or posterior oropharyngeal erythema.   Eyes:       Conjunctiva/sclera: Conjunctivae normal.   Neck:      Vascular: No carotid bruit.   Cardiovascular:      Rate and Rhythm: Normal rate and regular rhythm.      Pulses: Normal pulses.      Heart sounds: Normal heart sounds. No murmur heard.  Pulmonary:      Effort: Pulmonary effort is normal.      Breath sounds: Normal breath sounds. No wheezing or rales.   Abdominal:      General: Bowel sounds are normal.      Palpations: Abdomen is soft. There is no mass.      Tenderness: There is no abdominal tenderness.   Musculoskeletal:      Right lower leg: No edema.      Left lower leg: No edema.   Lymphadenopathy:      Cervical: No cervical adenopathy.   Skin:     General: Skin is warm and dry.      Findings: No rash.   Neurological:      General: No focal deficit present.      Mental Status: He is alert.   Psychiatric:         Mood and Affect: Mood normal.         Behavior: Behavior normal.         Thought Content: Thought content normal.          Wt Readings from Last 6 Encounters:   06/11/25 99 lb (44.9 kg)   04/08/25 95 lb (43.1 kg)   04/01/25 94 lb 6.4 oz (42.8 kg)   03/05/25 98 lb (44.5 kg)   01/17/25 105 lb 4.8 oz (47.8 kg)   02/14/25 97 lb (44 kg)             ASSESSMENT/PLAN:   1. Type 2 diabetes mellitus with other specified complication, without long-term current use of insulin (HCC)  Patient is currently taking the metformin 1 pill twice a day.  A1c is now up to 8.0.  We will increase the metformin 500 mg up to 2 pills in the morning and 1 pill in the evening.  Work on diet and exercise.  Follow-up in 3 months.  - POC Hemoglobin A1C  - metFORMIN  MG Oral Tablet 24 Hr; Take two pills by mouth in the morning and one pill in the evening.  Dispense: 270 tablet; Refill: 1    2. Pruritus  Patient has been following up with dermatology.  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  Patient will see how he does on this new treatment.  Last GFR was 55.  Medication list reviewed for possible culprit causing  this condition.  Most likely would be the ACE inhibitor which has a 1 to 15% incidence of pruritus.  May consider changing medications.  Again we will wait to see how he does on the naltrexone.  He just started it.    3. Essential hypertension  Well-controlled.  Continue current treatment.    4. Iron deficiency anemia, unspecified iron deficiency anemia type  Stable.  Continue current iron supplementation.  Follow CBC.    5. Atherosclerosis of native coronary artery of native heart with angina pectoris  Asymptomatic.  Continue current treatment.  Work on diet and exercise.         Meds This Visit:  Requested Prescriptions     Signed Prescriptions Disp Refills    metFORMIN  MG Oral Tablet 24 Hr 270 tablet 1     Sig: Take two pills by mouth in the morning and one pill in the evening.       Imaging & Referrals:  None         Priyank Rodriguez MD        [1]   Patient Active Problem List  Diagnosis    Type 2 diabetes mellitus without complication, without long-term current use of insulin (HCC)    Coronary artery disease involving native coronary artery of native heart without angina pectoris    Essential hypertension    Gastroesophageal reflux disease    Bilateral carotid artery stenosis    RBBB (right bundle branch block)    Primary cerebellar degeneration (HCC)    Other iron deficiency anemia    Abnormal CT of the chest    Subacute cough    Type 2 diabetes mellitus with hypoglycemia without coma, without long-term current use of insulin (HCC)    Type 2 diabetes mellitus with hyperglycemia, without long-term current use of insulin (HCC)    Stage 3a chronic kidney disease (HCC)    Congestive heart failure, unspecified HF chronicity, unspecified heart failure type (HCC)    Anemia, unspecified    Dizziness and giddiness    Murmur    Other hyperlipidemia    PAD (peripheral artery disease)    Precordial pain    Premature beats    Primary localized osteoarthrosis, lower leg    Pure hypercholesterolemia    Shortness of  breath    Transient ischemic attack   [2]   Past Medical History:   Acute congestive heart failure, unspecified heart failure type (HCC)    Atherosclerosis of coronary artery    Congestive heart disease (HCC)    Coronary atherosclerosis    Diabetes (HCC)    Essential hypertension    Former smoker    High blood pressure    History of quadruple bypass    Pressure ulcer   [3]   Past Surgical History:  Procedure Laterality Date    Cabg  05/2009    Carotid endarterectomy      Colonoscopy      Other surgical history     [4]   Family History  Problem Relation Age of Onset    No Known Problems Father     Other (Other) Mother     Heart Disorder Brother    [5]   Social History  Socioeconomic History    Marital status:    Tobacco Use    Smoking status: Former     Passive exposure: Past    Smokeless tobacco: Never   Vaping Use    Vaping status: Never Used   Substance and Sexual Activity    Alcohol use: Never    Drug use: Never    Sexual activity: Not Currently     Partners: Female   Other Topics Concern    Reaction to local anesthetic No    Pt has a pacemaker No    Pt has a defibrillator No     Social Drivers of Health     Food Insecurity: No Food Insecurity (3/5/2025)    NCSS - Food Insecurity     Worried About Running Out of Food in the Last Year: No     Ran Out of Food in the Last Year: No   Transportation Needs: No Transportation Needs (3/5/2025)    NCSS - Transportation     Lack of Transportation: No   Housing Stability: Not At Risk (3/5/2025)    NCSS - Housing/Utilities     Has Housing: Yes     Worried About Losing Housing: No     Unable to Get Utilities: No   [6]   Current Outpatient Medications   Medication Sig Dispense Refill    metFORMIN  MG Oral Tablet 24 Hr Take two pills by mouth in the morning and one pill in the evening. 270 tablet 1    allopurinol 300 MG Oral Tab Take 1 tablet (300 mg total) by mouth daily. 90 tablet 1    clopidogrel 75 MG Oral Tab Take 1 tablet (75 mg total) by mouth daily. 90  tablet 3    ferrous sulfate 325 (65 FE) MG Oral Tab EC       naltrexone 50 MG Oral Tab Take 1 tablet (50 mg total) by mouth daily. 90 tablet 0    TRUEplus Lancets 33G Does not apply Misc 1 each by In Vitro route 2 (two) times daily. 200 each 3    SENEXON-S 8.6-50 MG Oral Tab Take 1 tablet by mouth daily. 90 tablet 3    TERAZOSIN 2 MG Oral Cap TAKE 1 CAPSULE BY MOUTH EVERY DAY AT NIGHT 90 capsule 3    losartan 100 MG Oral Tab Take 1 tablet (100 mg total) by mouth daily. 90 tablet 1    amLODIPine 5 MG Oral Tab Take 1 tablet (5 mg total) by mouth daily.      metoprolol succinate  MG Oral Tablet 24 Hr Take 1 tablet (100 mg total) by mouth daily.      furosemide 40 MG Oral Tab Take 1 tablet (40 mg total) by mouth BID (Diuretic). 60 tablet 1    Glucose Blood (ACCU-CHEK SMARTVIEW) In Vitro Strip Use 1 (one) new strip twice daily for blood glucose monitoring 200 strip 3    Neomycin-Polymyxin-Dexameth 3.5-91661-9.1 Ophthalmic Suspension 2 (two) times daily.      triamcinolone 0.1 % External Cream Apply topically 2 (two) times daily as needed. 45 g 3    cyanocobalamine 1000 MCG Oral Tab Take 1 tablet (1,000 mcg total) by mouth daily. 30 tablet 0    atorvastatin 40 MG Oral Tab Take 1 tablet (40 mg total) by mouth nightly.      Glucose Blood In Vitro Strip Check glucose once daily      Dorzolamide HCl-Timolol Mal 22.3-6.8 MG/ML Ophthalmic Solution Place 1 drop into both eyes 2 (two) times daily.      LUMIGAN 0.01 % Ophthalmic Solution Place 1 drop into both eyes every evening.     [7] No Known Allergies

## 2025-06-24 NOTE — TELEPHONE ENCOUNTER
Referral sent for DR. Greyson Yeboah at [facility]. I do not think she would get the opinion she needs from the requested provider in [place].

## 2025-06-25 ENCOUNTER — TELEPHONE (OUTPATIENT)
Dept: PULMONOLOGY | Facility: CLINIC | Age: 89
End: 2025-06-25

## 2025-06-25 RX ORDER — PREDNISONE 20 MG/1
TABLET ORAL
Qty: 10 TABLET | Refills: 0 | Status: SHIPPED | OUTPATIENT
Start: 2025-06-25

## 2025-06-25 RX ORDER — LEVOFLOXACIN 500 MG/1
500 TABLET, FILM COATED ORAL DAILY
Qty: 7 TABLET | Refills: 0 | Status: SHIPPED | OUTPATIENT
Start: 2025-06-25 | End: 2025-06-25 | Stop reason: ALTCHOICE

## 2025-06-25 RX ORDER — PREDNISONE 20 MG/1
TABLET ORAL
Qty: 10 TABLET | Refills: 0 | Status: SHIPPED | OUTPATIENT
Start: 2025-06-25 | End: 2025-06-25

## 2025-06-25 RX ORDER — AZITHROMYCIN 250 MG/1
TABLET, FILM COATED ORAL
Qty: 6 TABLET | Refills: 0 | Status: SHIPPED | OUTPATIENT
Start: 2025-06-25

## 2025-06-25 NOTE — TELEPHONE ENCOUNTER
RN, my note from October says that he was vastly improved in response to Levaquin and prednisone.  You can query this with him although if he insists, you can switch him from Levaquin to Z-Marcus.  You can order.  Would continue the prednisone as ordered.  I sent in those prescriptions and you could cancel the Levaquin

## 2025-06-25 NOTE — TELEPHONE ENCOUNTER
RN, please let the patient know that I sent in a prescription for Levaquin and short course prednisone as she had responded to that in the past.

## 2025-06-25 NOTE — TELEPHONE ENCOUNTER
Spoke with pt's daughter to provide information.  Pt's daughter states she will do some research on Dr. Yeboah and let our office know if she would still like a referral to a different Dermatologist.

## 2025-06-25 NOTE — TELEPHONE ENCOUNTER
Spoke with patient and informed of Dr. Pérez's message below. Patient states Levaquin did not help and will try azithromycin. Advised patient to monitor blood sugars when taking short course of Prednisone. Patient requesting prescriptions to be sent to [facility] in [place].     Spoke with Fallon at [facility] to cancel Levaquin and to cancel Prednisone (sent to different pharmacy).

## 2025-06-25 NOTE — TELEPHONE ENCOUNTER
Spoke with patient and informed of Dr. Pérez's orders below. Patient states he took Levaquin and short course of Prednisone in the past and it didn't help, still had cough. Patient requesting different medication.

## 2025-06-25 NOTE — TELEPHONE ENCOUNTER
Spoke with patient. Patient reports coughing up yellow phlegm. Patient is requesting medication but not anything that will bring \"sugar up.\"

## 2025-07-02 DIAGNOSIS — E11.9 TYPE 2 DIABETES MELLITUS WITHOUT COMPLICATION, WITHOUT LONG-TERM CURRENT USE OF INSULIN (HCC): Primary | ICD-10-CM

## 2025-07-07 NOTE — TELEPHONE ENCOUNTER
I called [facility], spoke with Kayleen, made aware of below. He states the new device is the Accucheck Guide-->Accucheck guide test strips, lancets are soft-clicks lancets.      Dr. Rodriguez - please see pended Rx for new device and supplies, sign if appropriate [otherwise cancel pended Rxs and advise]

## 2025-07-07 NOTE — TELEPHONE ENCOUNTER
The following message popped up with the request as follows:  \"  \"Alternative Required    You are reordering:  Glucose Blood (ACCU-CHEK SMARTVIEW) In Vitro Strip: Use 1 (one) new strip twice daily for blood glucose monitoring, Normal, Disp-200 strip, R-3 Dx Code: E11.9 (Type 2 diabetes mellitus without complication, without long-term current use of insulin)  Details     Accu-Chek Nya meters, SmartView test strips and control liquid have been discontinued by the  and is no longer available.  Please select a replacement product in the Alternatives section below.\"

## 2025-07-08 ENCOUNTER — HOSPITAL ENCOUNTER (OUTPATIENT)
Age: 89
Discharge: HOME | End: 2025-07-08
Payer: MEDICARE

## 2025-07-08 VITALS
HEART RATE: 66 BPM | SYSTOLIC BLOOD PRESSURE: 128 MMHG | RESPIRATION RATE: 16 BRPM | OXYGEN SATURATION: 96 % | TEMPERATURE: 97 F | DIASTOLIC BLOOD PRESSURE: 80 MMHG

## 2025-07-08 DIAGNOSIS — Z98.811: ICD-10-CM

## 2025-07-08 DIAGNOSIS — H61.23 BILATERAL IMPACTED CERUMEN: Primary | ICD-10-CM

## 2025-07-08 DIAGNOSIS — K08.50: ICD-10-CM

## 2025-07-08 PROCEDURE — 99213 OFFICE O/P EST LOW 20 MIN: CPT | Performed by: NURSE PRACTITIONER

## 2025-07-08 NOTE — ED PROVIDER NOTES
Patient Seen in: [facility]       The following individual(s) verbally consented to be recorded using ambient AI listening technology and understand that they can each withdraw their consent to this listening technology at any point by asking the clinician to turn off or pause the recording:    Patient name: Mohamud Fitzpatrick  Additional names:  Samina, daughter      History  Chief Complaint   Patient presents with    Mouth/Lip Problem     Stated Complaint: Right & Left sound muffy and head is heave and pain when dentures are put in    Subjective:   HPI     Mohamud Fitzpatrick is an 89 year old male who presents with mouth pain and ear discomfort. He is accompanied by his daughter.    He experiences a sensation of heaviness in his head and ears, describing it as feeling like there is water in his ears, along with difficulty hearing clearly. He has a history of wax buildup in his ears, which has caused similar symptoms in the past.    He describes significant pain in his mouth and gums, which he associates with his dentures. The dentures are relatively new, having been in use for approximately four to six months. He experiences relief from pressure when the dentures are removed, although the pain persists. No sores or injuries in his mouth, but he mentions a sensation of pressure and pulling, particularly when attempting to remove the dentures. He has not seen a dentist recently for this issue. The symptoms in his mouth began a couple of days ago, and prior to this, he was able to remove his dentures without difficulty. No external pain or visible swelling in the mouth area.        Objective:     Past Medical History:    Acute congestive heart failure, unspecified heart failure type (HCC)    Atherosclerosis of coronary artery    Congestive heart disease (HCC)    Coronary atherosclerosis    Diabetes (HCC)    Essential hypertension    Former smoker    High blood pressure    History of quadruple  bypass    Pressure ulcer              Past Surgical History:   Procedure Laterality Date    Cabg  05/2009    Carotid endarterectomy      Colonoscopy      Other surgical history                  Social History     Socioeconomic History    Marital status:    Tobacco Use    Smoking status: Former     Passive exposure: Past    Smokeless tobacco: Never   Vaping Use    Vaping status: Never Used   Substance and Sexual Activity    Alcohol use: Never    Drug use: Never    Sexual activity: Not Currently     Partners: Female   Other Topics Concern    Reaction to local anesthetic No    Pt has a pacemaker No    Pt has a defibrillator No     Social Drivers of Health     Food Insecurity: No Food Insecurity (3/5/2025)    NCSS - Food Insecurity     Worried About Running Out of Food in the Last Year: No     Ran Out of Food in the Last Year: No   Transportation Needs: No Transportation Needs (3/5/2025)    NCSS - Transportation     Lack of Transportation: No   Housing Stability: Not At Risk (3/5/2025)    NCSS - Housing/Utilities     Has Housing: Yes     Worried About Losing Housing: No     Unable to Get Utilities: No              Review of Systems    Positive for stated complaint: Right & Left sound muffy and head is heave and pain when dentures are put in  Other systems are as noted in HPI.  Constitutional and vital signs reviewed.      All other systems reviewed and negative except as noted above.      Physical Exam    ED Triage Vitals [07/08/25 1616]   /80   Pulse 66   Resp 16   Temp 97.4 °F (36.3 °C)   Temp src Oral   SpO2 96 %   O2 Device None (Room air)       Current Vitals:   Vital Signs  BP: 128/80  Pulse: 66  Resp: 16  Temp: 97.4 °F (36.3 °C)  Temp src: Oral    Oxygen Therapy  SpO2: 96 %  O2 Device: None (Room air)            Physical Exam  Vitals and nursing note reviewed.   Constitutional:       General: He is not in acute distress.     Appearance: Normal appearance. He is not ill-appearing or toxic-appearing.       Comments: Elderly and frail   HENT:      Head: Normocephalic and atraumatic.      Right Ear: Ear canal and external ear normal. There is impacted cerumen.      Left Ear: Ear canal and external ear normal. There is impacted cerumen.      Ears:      Comments: Hard of hearing     Nose: Nose normal.      Mouth/Throat:      Mouth: Mucous membranes are moist.      Pharynx: Oropharynx is clear.      Comments: Top dentures.  Partial bottom dentures.  Patient able to remove without difficulty  No sores, lesions, swelling, redness, tenderness, drainage to the gums  Eyes:      Pupils: Pupils are equal, round, and reactive to light.   Cardiovascular:      Rate and Rhythm: Normal rate and regular rhythm.      Pulses: Normal pulses.      Heart sounds: Murmur heard.   Pulmonary:      Effort: Pulmonary effort is normal. No respiratory distress.      Breath sounds: Normal breath sounds.   Abdominal:      General: Abdomen is flat.      Palpations: Abdomen is soft.   Musculoskeletal:         General: No signs of injury. Normal range of motion.      Cervical back: Normal range of motion and neck supple.   Skin:     General: Skin is warm and dry.      Capillary Refill: Capillary refill takes less than 2 seconds.   Neurological:      General: No focal deficit present.      Mental Status: He is alert and oriented to person, place, and time.      GCS: GCS eye subscore is 4. GCS verbal subscore is 5. GCS motor subscore is 6.   Psychiatric:         Mood and Affect: Mood normal.         Behavior: Behavior normal.         Thought Content: Thought content normal.         Judgment: Judgment normal.             ED Course  Labs Reviewed - No data to display          MDM           Medical Decision Making  Differential diagnoses reflecting the complexity of care include: Hearing loss, cerumen impaction, poorly fitting dentures, gum abscess, gum sore  Patient with bilateral cerumen impaction.  Ears were irrigated by the MA with removal of wax.   Patient states ear symptoms have improved.  Increased hearing.  Canals are clear.  No otitis media.  No trauma.  No otitis externa.  He is having some pressure and pain when wearing his dentures.  No source of infection or trauma inside the mouth.  No lesions, cuts, abscess.  Recommend evaluation with dentist regarding fitting of the dentures      Plan of Care: See a dentist for denture fitting    Results and plan of care discussed with the patient/family. They are in agreement with discharge. They understand to follow up with their primary doctor or the referral physician for further evaluation, especially if no improvement.  Also discussed the limitations of immediate care, patient is aware that if symptoms are worse they should go to the emergency room. Verbal and written discharge instructions were given.     My independent interpretation of studies of: N/A  Diagnostic tests and medications considered but not ordered were: No indication for antibiotics  Shared decision making was done by: Patient agreeable to irrigation of his ears  Comorbidities that add complexity to management include: CAD, CHF, DM, TIA  External chart review was done and was noted: daughter  History obtained by an independent source was from: Daughter  Discussions and management was done with: N/A  Social determinants of health that affect care: age              Problems Addressed:  Bilateral impacted cerumen: acute illness or injury  Problem with denture: acute illness or injury    Amount and/or Complexity of Data Reviewed  Independent Historian:      Details: daughter    Risk  OTC drugs.        Disposition and Plan     Clinical Impression:  1. Bilateral impacted cerumen    2. Problem with denture         Disposition:  Discharge  7/8/2025  5:02 pm    Follow-up:  Priyank Rodriguez MD  [address] [place] [id number]  [phone]                Medications Prescribed:  Discharge Medication List as of 7/8/2025  5:02 PM                 Supplementary Documentation:

## 2025-07-08 NOTE — ED INITIAL ASSESSMENT (HPI)
Pt reports mouth pain, ears feeling clogged, and head feeling heavy for a few days. Pt reports that he wears dentures. Pt accompanied by daughter.  
English

## 2025-07-09 RX ORDER — LANCETS
1 EACH MISCELLANEOUS 2 TIMES DAILY
Qty: 200 EACH | Refills: 3 | Status: SHIPPED | OUTPATIENT
Start: 2025-07-09

## 2025-07-09 RX ORDER — BLOOD SUGAR DIAGNOSTIC
1 STRIP MISCELLANEOUS 2 TIMES DAILY
Qty: 200 EACH | Refills: 3 | Status: SHIPPED | OUTPATIENT
Start: 2025-07-09

## 2025-07-09 RX ORDER — BLOOD-GLUCOSE METER
1 EACH MISCELLANEOUS AS DIRECTED
Qty: 1 KIT | Refills: 0 | Status: SHIPPED | OUTPATIENT
Start: 2025-07-09

## 2025-07-31 ENCOUNTER — TELEPHONE (OUTPATIENT)
Dept: CASE MANAGEMENT | Age: 89
End: 2025-07-31

## 2025-07-31 DIAGNOSIS — L29.9 ITCHY SKIN: Primary | ICD-10-CM

## 2025-08-01 ENCOUNTER — LAB ENCOUNTER (OUTPATIENT)
Dept: LAB | Facility: HOSPITAL | Age: 89
End: 2025-08-01
Attending: INTERNAL MEDICINE

## 2025-08-01 DIAGNOSIS — N18.31 STAGE 3A CHRONIC KIDNEY DISEASE (HCC): ICD-10-CM

## 2025-08-01 DIAGNOSIS — D50.8 OTHER IRON DEFICIENCY ANEMIA: ICD-10-CM

## 2025-08-01 LAB
BASOPHILS # BLD AUTO: 0.09 X10(3) UL (ref 0–0.2)
BASOPHILS NFR BLD AUTO: 1.1 %
DEPRECATED HBV CORE AB SER IA-ACNC: 367 NG/ML (ref 50–336)
DEPRECATED RDW RBC AUTO: 41.6 FL (ref 35.1–46.3)
EOSINOPHIL # BLD AUTO: 1.11 X10(3) UL (ref 0–0.7)
EOSINOPHIL NFR BLD AUTO: 13.2 %
ERYTHROCYTE [DISTWIDTH] IN BLOOD BY AUTOMATED COUNT: 14.3 % (ref 11–15)
FOLATE SERPL-MCNC: 8.8 NG/ML (ref 5.4–?)
HCT VFR BLD AUTO: 28.6 % (ref 39–53)
HGB BLD-MCNC: 9.2 G/DL (ref 13–17.5)
IMM GRANULOCYTES # BLD AUTO: 0.03 X10(3) UL (ref 0–1)
IMM GRANULOCYTES NFR BLD: 0.4 %
IRON SATN MFR SERPL: 30 % (ref 20–50)
IRON SERPL-MCNC: 70 UG/DL (ref 65–175)
LYMPHOCYTES # BLD AUTO: 1.3 X10(3) UL (ref 1–4)
LYMPHOCYTES NFR BLD AUTO: 15.4 %
MCH RBC QN AUTO: 25.9 PG (ref 26–34)
MCHC RBC AUTO-ENTMCNC: 32.2 G/DL (ref 31–37)
MCV RBC AUTO: 80.6 FL (ref 80–100)
MONOCYTES # BLD AUTO: 0.75 X10(3) UL (ref 0.1–1)
MONOCYTES NFR BLD AUTO: 8.9 %
NEUTROPHILS # BLD AUTO: 5.16 X10 (3) UL (ref 1.5–7.7)
NEUTROPHILS # BLD AUTO: 5.16 X10(3) UL (ref 1.5–7.7)
NEUTROPHILS NFR BLD AUTO: 61 %
PLATELET # BLD AUTO: 252 10(3)UL (ref 150–450)
RBC # BLD AUTO: 3.55 X10(6)UL (ref 3.8–5.8)
TOTAL IRON BINDING CAPACITY: 233 UG/DL (ref 250–425)
TRANSFERRIN SERPL-MCNC: 168 MG/DL (ref 215–365)
VIT B12 SERPL-MCNC: >2000 PG/ML (ref 211–911)
WBC # BLD AUTO: 8.4 X10(3) UL (ref 4–11)

## 2025-08-01 PROCEDURE — 82607 VITAMIN B-12: CPT

## 2025-08-01 PROCEDURE — 83540 ASSAY OF IRON: CPT

## 2025-08-01 PROCEDURE — 82668 ASSAY OF ERYTHROPOIETIN: CPT

## 2025-08-01 PROCEDURE — 82746 ASSAY OF FOLIC ACID SERUM: CPT

## 2025-08-01 PROCEDURE — 36415 COLL VENOUS BLD VENIPUNCTURE: CPT

## 2025-08-01 PROCEDURE — 85025 COMPLETE CBC W/AUTO DIFF WBC: CPT

## 2025-08-01 PROCEDURE — 84466 ASSAY OF TRANSFERRIN: CPT

## 2025-08-01 PROCEDURE — 82728 ASSAY OF FERRITIN: CPT

## 2025-08-04 LAB — ERYTHROPOIETIN: 4 MIU/ML

## 2025-08-05 PROBLEM — D63.1 ANEMIA IN CHRONIC KIDNEY DISEASE (CKD): Status: ACTIVE | Noted: 2025-08-05

## 2025-08-05 PROBLEM — N18.9 ANEMIA IN CHRONIC KIDNEY DISEASE (CKD): Status: ACTIVE | Noted: 2025-08-05

## 2025-08-08 ENCOUNTER — APPOINTMENT (OUTPATIENT)
Facility: LOCATION | Age: 89
End: 2025-08-08
Attending: INTERNAL MEDICINE

## 2025-08-12 ENCOUNTER — APPOINTMENT (OUTPATIENT)
Facility: LOCATION | Age: 89
End: 2025-08-12
Attending: INTERNAL MEDICINE

## 2025-08-15 ENCOUNTER — TELEPHONE (OUTPATIENT)
Facility: LOCATION | Age: 89
End: 2025-08-15

## 2025-08-29 RX ORDER — LOSARTAN POTASSIUM 100 MG/1
100 TABLET ORAL DAILY
Qty: 90 TABLET | Refills: 3 | Status: SHIPPED | OUTPATIENT
Start: 2025-08-29

## (undated) DIAGNOSIS — Z01.00 ROUTINE EYE EXAM: Primary | ICD-10-CM

## (undated) NOTE — LETTER
Mohamud Fitzpatrick     [address]  [place] [address]        Peyton Mallory,     This is the [facility], office of Dr. Priyank Rodriguez    Thank you for putting your trust in [facility].  Our goal is to deliver the highest quality healthcare and an exceptional patient experience. Upon reviewing of your medical record shows you are due for the following:         Annual Medicare Physical Exam    Diabetic Foot Exam         Please call [phone] to schedule your appointment or schedule online via [employer].     If you changed to a new provider at another facility, please notify the clinic to update your records.    If you had any recent testing at another facility, please have your results faxed to our office at [phone].           Thank you and have a great day!

## (undated) NOTE — ED AVS SNAPSHOT
Donovan Jones   MRN: [id number]    Department:  [facility] Emergency Department   Date of Visit:  11/25/2018           Disclosure     Insurance plans vary and the physician(s) referred by the ER may not be covered by your plan.  Please conta within the next three months to obtain basic health screening including reassessment of your blood pressure.     IF THERE IS ANY CHANGE OR WORSENING OF YOUR CONDITION, CALL YOUR PRIMARY CARE PHYSICIAN AT ONCE OR RETURN IMMEDIATELY TO THE EMERGENCY DEPARTMEN

## (undated) NOTE — Clinical Note
Initial assessment completed with patient.  Pt has an appt on 4/7/25.  Patient agrees to additional follow-up calls from nurse care manager.  Thank you!

## (undated) NOTE — LETTER
3/10/2023              [address] [place] [address]         Dear Luis Fernando Dais is under my medical care. He was recently seen and evaluated in the office. I see no medical contraindication to proceeding with the proposed cataract surgery. The patient is medically cleared.       Sincerely,         Zenia Guerra MD  [facility], [address] [address] [phone]        Document electronically generated by:  Zenia Guerra MD

## (undated) NOTE — Clinical Note
Initial assessment completed with patient.  Appointment scheduled with nurse practitioner in cardiology on 1/23. Patient agrees to additional follow-up calls from nurse care manager.  Thank you!

## (undated) NOTE — Clinical Note
Thank you Dr Priyank Rodriguez MD for the opportunity to participate in the care of this interesting patient. Please do contact me if I may be [phone] any further assistance  Juanito Caruso MD [facility] Hematology/Oncology